# Patient Record
Sex: FEMALE | Race: WHITE | NOT HISPANIC OR LATINO | Employment: FULL TIME | URBAN - METROPOLITAN AREA
[De-identification: names, ages, dates, MRNs, and addresses within clinical notes are randomized per-mention and may not be internally consistent; named-entity substitution may affect disease eponyms.]

---

## 2017-02-09 ENCOUNTER — OFFICE VISIT (OUTPATIENT)
Dept: FAMILY MEDICINE | Facility: CLINIC | Age: 32
End: 2017-02-09
Payer: COMMERCIAL

## 2017-02-09 VITALS
HEIGHT: 67 IN | DIASTOLIC BLOOD PRESSURE: 81 MMHG | OXYGEN SATURATION: 98 % | TEMPERATURE: 97.3 F | BODY MASS INDEX: 32.8 KG/M2 | HEART RATE: 83 BPM | WEIGHT: 209 LBS | RESPIRATION RATE: 18 BRPM | SYSTOLIC BLOOD PRESSURE: 116 MMHG

## 2017-02-09 DIAGNOSIS — N62 HYPERTROPHY OF BREAST: ICD-10-CM

## 2017-02-09 DIAGNOSIS — Z01.818 PREOP GENERAL PHYSICAL EXAM: Primary | ICD-10-CM

## 2017-02-09 LAB — HGB BLD-MCNC: 12.9 G/DL (ref 11.7–15.7)

## 2017-02-09 PROCEDURE — 36415 COLL VENOUS BLD VENIPUNCTURE: CPT | Performed by: NURSE PRACTITIONER

## 2017-02-09 PROCEDURE — 85018 HEMOGLOBIN: CPT | Performed by: NURSE PRACTITIONER

## 2017-02-09 PROCEDURE — 99214 OFFICE O/P EST MOD 30 MIN: CPT | Performed by: NURSE PRACTITIONER

## 2017-02-09 NOTE — NURSING NOTE
"Chief Complaint   Patient presents with     Pre-Op Exam       Initial Resp 18  Ht 5' 7\" (1.702 m)  Wt 209 lb (94.802 kg)  BMI 32.73 kg/m2 Estimated body mass index is 32.73 kg/(m^2) as calculated from the following:    Height as of this encounter: 5' 7\" (1.702 m).    Weight as of this encounter: 209 lb (94.802 kg).  Medication Reconciliation: complete     Nivia Stevens MA        "

## 2017-02-09 NOTE — PROGRESS NOTES
[address] [address]  [phone]  Dept: [phone]    PRE-OP EVALUATION:  Today's date: 2017    Brent Walters (: 1985) presents for pre-operative evaluation assessment as requested by Dr. Valverde.  She requires evaluation and anesthesia risk assessment prior to undergoing surgery/procedure for Breast hypertrophy.  Proposed procedure: Breast reduction     Date of Surgery/ Procedure: 2017  Time of Surgery/ Procedure: 7:30   Hospital/Surgical Facility: [facility]   Fax number for surgical facility: [phone]  Telephone #: [phone]  Primary Physician: Josefina Aquino  Type of Anesthesia Anticipated: to be determined    Patient has a Health Care Directive or Living Will:  NO    Preop Questions 2017   1.  Do you have a history of heart attack, stroke, stent, bypass or surgery on an artery in the head, neck, heart or legs? No   2.  Do you ever have any pain or discomfort in your chest? No   3.  Do you have a history of  Heart Failure? No   4.   Are you troubled by shortness of breath when:  walking on a level surface, or up a slight hill, or at night? No   5.  Do you currently have a cold, bronchitis or other respiratory infection? No   6.  Do you have a cough, shortness of breath, or wheezing? No   7.  Do you sometimes get pains in the calves of your legs when you walk? No   8. Do you or anyone in your family have previous history of blood clots? No   9.  Do you or does anyone in your family have a serious bleeding problem such as prolonged bleeding following surgeries or cuts? No   10. Have you ever had problems with anemia or been told to take iron pills? YES - When pt was pregnant    11. Have you had any abnormal blood loss such as black, tarry or bloody stools, or abnormal vaginal bleeding? No   12. Have you ever had a blood transfusion? No   13. Have you or any of your relatives ever had problems with anesthesia? No   14. Do  you have sleep apnea, excessive snoring or daytime drowsiness? No   15. Do you have any prosthetic heart valves? No   16. Do you have prosthetic joints? No   17. Is there any chance that you may be pregnant? No           HPI:                                                      Brief HPI related to upcoming procedure: Chronic back pain due to breast hypertrophy      See problem list for active medical problems.  Problems all longstanding and stable, except as noted/documented.  See ROS for pertinent symptoms related to these conditions.                                                                                                  .    MEDICAL HISTORY:                                                      Patient Active Problem List    Diagnosis Date Noted     LSIL (low grade squamous intraepithelial lesion) on Pap smear 11/10/2011     Priority: Medium     11/10/11: LSIL. Plan colp.  12/1/11: [place] - QUIQUE I. Plan pap in 6 & 12 months.  7/23/12: NIL pap. Plan pap in 6 months.  1/2013 Pap Ascus +HPV 34 (probable HR)Per Dr. Ramos. Not one of high risk 14 HPVs so will have pap in 3 yrs.  07/19/16: NIL pap. Plan cotest in 1 year per provider       CARDIOVASCULAR SCREENING; LDL GOAL LESS THAN 160 03/08/2011     Priority: Medium     Major depression in complete remission (H) 03/08/2011     Priority: Medium     Anxiety 03/08/2011     Priority: Medium      Past Medical History   Diagnosis Date     LSIL (low grade squamous intraepithelial lesion) on Pap smear 11/2011     colp - QUIQUE I     Depressive disorder      Past Surgical History   Procedure Laterality Date     D & c       Surgical history of -        tummy tuck     Abdomen surgery       [facility]     Current Outpatient Prescriptions   Medication Sig Dispense Refill     citalopram (CELEXA) 40 MG tablet Take 1 tablet (40 mg) by mouth daily 90 tablet 3     citalopram (CELEXA) 40 MG tablet Take 1 tablet (40 mg) by mouth daily 30 tablet PRN     LORazepam (ATIVAN) 0.5 MG  tablet Take 1 tablet (0.5 mg) by mouth every 6 hours as needed for anxiety 30 tablet 1     CLINDAMYCIN HCL PO Take by mouth 4 times daily Patient doesn't know the mg strength       tiZANidine (ZANAFLEX) 4 MG tablet Take 1 tablet (4 mg) by mouth nightly as needed for muscle spasms 30 tablet 0     multivitamin, therapeutic with minerals (MULTI-VITAMIN) TABS Take 1 tablet by mouth daily       OTC products: None, except as noted above    Allergies   Allergen Reactions     Sulfa Drugs       Latex Allergy: NO    Social History   Substance Use Topics     Smoking status: Never Smoker      Smokeless tobacco: Never Used     Alcohol Use: Yes      Comment: maybe 3     History   Drug Use No       REVIEW OF SYSTEMS:                                                    C: NEGATIVE for fever, chills, change in weight  I: NEGATIVE for worrisome rashes, moles or lesions  E: NEGATIVE for vision changes or irritation  E/M: NEGATIVE for ear, mouth and throat problems  R: NEGATIVE for significant cough or SOB  CV: NEGATIVE for chest pain, palpitations or peripheral edema  GI: NEGATIVE for nausea, abdominal pain, heartburn, or change in bowel habits  : NEGATIVE for frequency, dysuria, or hematuria  N: NEGATIVE for weakness, dizziness or paresthesias  E: NEGATIVE for temperature intolerance, skin/hair changes  H: NEGATIVE for bleeding problems  P: NEGATIVE for changes in mood or affect    EXAM:                                                    There were no vitals taken for this visit.    GENERAL APPEARANCE: healthy, alert and no distress     EYES: EOMI,- PERRL     HENT: ear canals and TM's normal and nose and mouth without ulcers or lesions     NECK: no adenopathy, no asymmetry, masses, or scars and thyroid normal to palpation     RESP: lungs clear to auscultation - no rales, rhonchi or wheezes     CV: regular rates and rhythm, normal S1 S2, no S3 or S4 and no murmur, click or rub -     ABDOMEN:  soft, nontender, no HSM or masses and  bowel sounds normal     MS: extremities normal- no gross deformities noted, no evidence of inflammation in joints, FROM in all extremities.     SKIN: no suspicious lesions or rashes     NEURO: Normal strength and tone, sensory exam grossly normal, mentation intact and speech normal     PSYCH: mentation appears normal. and affect normal/bright     LYMPHATICS: No axillary, cervical, inguinal, or supraclavicular nodes    DIAGNOSTICS:                                                    Hgb:     Recent Labs   Lab Test  03/04/15   1429  12/10/14   1406   01/16/13   1042   HGB  13.0  12.6   < >  13.2   PLT   --   243   --    --    POTASSIUM   --    --    --   4.1    < > = values in this interval not displayed.        IMPRESSION:                                                    Reason for surgery/procedure: Breast reduction    The proposed surgical procedure is considered INTERMEDIATE risk.    REVISED CARDIAC RISK INDEX  The patient has the following serious cardiovascular risks for perioperative complications such as (MI, PE, VFib and 3  AV Block):  No serious cardiac risks  INTERPRETATION: 0 risks: Class I (very low risk - 0.4% complication rate)    The patient has the following additional risks for perioperative complications:  No identified additional risks    No diagnosis found.    RECOMMENDATIONS:                                                          --Patient is to take all scheduled medications on the day of surgery EXCEPT for modifications listed below.    APPROVAL GIVEN to proceed with proposed procedure, without further diagnostic evaluation       Signed Electronically by: Josefina Aquino NP    Copy of this evaluation report is provided to requesting physician.    [place] Preop Guidelines

## 2017-05-10 ENCOUNTER — OFFICE VISIT (OUTPATIENT)
Dept: FAMILY MEDICINE | Facility: CLINIC | Age: 32
End: 2017-05-10
Payer: COMMERCIAL

## 2017-05-10 ENCOUNTER — TELEPHONE (OUTPATIENT)
Dept: FAMILY MEDICINE | Facility: CLINIC | Age: 32
End: 2017-05-10

## 2017-05-10 ENCOUNTER — RADIANT APPOINTMENT (OUTPATIENT)
Dept: GENERAL RADIOLOGY | Facility: CLINIC | Age: 32
End: 2017-05-10
Attending: NURSE PRACTITIONER
Payer: COMMERCIAL

## 2017-05-10 VITALS
WEIGHT: 216.5 LBS | SYSTOLIC BLOOD PRESSURE: 108 MMHG | HEART RATE: 92 BPM | BODY MASS INDEX: 33.91 KG/M2 | RESPIRATION RATE: 20 BRPM | TEMPERATURE: 98.1 F | OXYGEN SATURATION: 98 % | DIASTOLIC BLOOD PRESSURE: 78 MMHG

## 2017-05-10 DIAGNOSIS — R10.9 ABDOMINAL PAIN, UNSPECIFIED LOCATION: Primary | ICD-10-CM

## 2017-05-10 DIAGNOSIS — R10.9 ABDOMINAL PAIN, UNSPECIFIED LOCATION: ICD-10-CM

## 2017-05-10 DIAGNOSIS — F32.5 MAJOR DEPRESSION IN COMPLETE REMISSION (H): ICD-10-CM

## 2017-05-10 LAB
ALBUMIN UR-MCNC: NEGATIVE MG/DL
APPEARANCE UR: CLEAR
BASOPHILS # BLD AUTO: 0 10E9/L (ref 0–0.2)
BASOPHILS NFR BLD AUTO: 0.3 %
BILIRUB UR QL STRIP: NEGATIVE
COLOR UR AUTO: YELLOW
DIFFERENTIAL METHOD BLD: ABNORMAL
EOSINOPHIL # BLD AUTO: 0.1 10E9/L (ref 0–0.7)
EOSINOPHIL NFR BLD AUTO: 1.1 %
ERYTHROCYTE [DISTWIDTH] IN BLOOD BY AUTOMATED COUNT: 12.4 % (ref 10–15)
GLUCOSE UR STRIP-MCNC: NEGATIVE MG/DL
HCT VFR BLD AUTO: 33.4 % (ref 35–47)
HGB BLD-MCNC: 10.8 G/DL (ref 11.7–15.7)
HGB UR QL STRIP: ABNORMAL
KETONES UR STRIP-MCNC: NEGATIVE MG/DL
LEUKOCYTE ESTERASE UR QL STRIP: NEGATIVE
LYMPHOCYTES # BLD AUTO: 2.9 10E9/L (ref 0.8–5.3)
LYMPHOCYTES NFR BLD AUTO: 30.3 %
MCH RBC QN AUTO: 30.4 PG (ref 26.5–33)
MCHC RBC AUTO-ENTMCNC: 32.3 G/DL (ref 31.5–36.5)
MCV RBC AUTO: 94 FL (ref 78–100)
MICRO REPORT STATUS: NORMAL
MONOCYTES # BLD AUTO: 0.7 10E9/L (ref 0–1.3)
MONOCYTES NFR BLD AUTO: 7.7 %
NEUTROPHILS # BLD AUTO: 5.8 10E9/L (ref 1.6–8.3)
NEUTROPHILS NFR BLD AUTO: 60.6 %
NITRATE UR QL: NEGATIVE
PH UR STRIP: 7 PH (ref 5–7)
PLATELET # BLD AUTO: 230 10E9/L (ref 150–450)
RBC # BLD AUTO: 3.55 10E12/L (ref 3.8–5.2)
RBC #/AREA URNS AUTO: NORMAL /HPF (ref 0–2)
SP GR UR STRIP: 1.02 (ref 1–1.03)
SPECIMEN SOURCE: NORMAL
URN SPEC COLLECT METH UR: ABNORMAL
UROBILINOGEN UR STRIP-ACNC: 0.2 EU/DL (ref 0.2–1)
WBC # BLD AUTO: 9.6 10E9/L (ref 4–11)
WBC #/AREA URNS AUTO: NORMAL /HPF (ref 0–2)
WET PREP SPEC: NORMAL

## 2017-05-10 PROCEDURE — 36415 COLL VENOUS BLD VENIPUNCTURE: CPT | Performed by: NURSE PRACTITIONER

## 2017-05-10 PROCEDURE — 87210 SMEAR WET MOUNT SALINE/INK: CPT | Performed by: NURSE PRACTITIONER

## 2017-05-10 PROCEDURE — 85025 COMPLETE CBC W/AUTO DIFF WBC: CPT | Performed by: NURSE PRACTITIONER

## 2017-05-10 PROCEDURE — 74020 XR ABDOMEN 2 VW: CPT

## 2017-05-10 PROCEDURE — 81001 URINALYSIS AUTO W/SCOPE: CPT | Performed by: NURSE PRACTITIONER

## 2017-05-10 PROCEDURE — 99214 OFFICE O/P EST MOD 30 MIN: CPT | Performed by: NURSE PRACTITIONER

## 2017-05-10 RX ORDER — OXYCODONE AND ACETAMINOPHEN 5; 325 MG/1; MG/1
1 TABLET ORAL EVERY 8 HOURS PRN
Qty: 10 TABLET | Refills: 0 | Status: SHIPPED | OUTPATIENT
Start: 2017-05-10 | End: 2017-05-12

## 2017-05-10 NOTE — PROGRESS NOTES
"  SUBJECTIVE:                                                    Brent Walters is a 31 year old female who presents to clinic today for the following health issues:      Abdominal Pain/Pelvic Pain       Duration: yesterday after pt ate some smoked salmon and cheese curds her stomach started hurting. It was pain from her lower abdominal area near bladder and radiating upwards. She thought maybe what she ate caused her symptoms but she has never had a reaction before.    Pain did not go away. Stomach was very bloated and tight. Overnight she was having flank pain and low back and also in rectum area. Passing gas is painful.      She feels off, weak and sluggish. There is a constant cramping, aching tenderness, and intermittent sharp abdominal pain. It is painful to walk.     Accompanying signs and symptoms:    Fever/chills: no   Flank pain no  Nausea and vomiting: YES- nausea is very mild, \"more painful than anything\". She tried to eat yogurt today and it was painful.     Therapies tried and outcome: none        She noticed some brownish vaginal discharge this morning.  Her period is not due for another 1.5 weeks.  She is not concerned about STIs.  She has had the same female partner for many years.   Pressure with urination.  No dysuria or hematuria.   Most of the abdominal pain is across her lower abdomen.    BMs have been regular.         Problem list and histories reviewed & adjusted, as indicated.  Additional history: as documented        Reviewed and updated as needed this visit by clinical staff       Reviewed and updated as needed this visit by Provider         ROS:  C: NEGATIVE for fever, chills, change in weight  INTEGUMENTARY/SKIN: NEGATIVE for worrisome rashes, moles or lesions  E/M: NEGATIVE for ear, mouth and throat problems  R: NEGATIVE for significant cough or SOB  CV: NEGATIVE for chest pain, palpitations or peripheral edema  GI: see HPI  MUSCULOSKELETAL: NEGATIVE for significant arthralgias or " myalgia  NEURO: NEGATIVE for weakness, dizziness or paresthesias    OBJECTIVE:                                                    /78  Pulse 92  Temp 98.1  F (36.7  C) (Oral)  Resp 20  Wt 216 lb 8 oz (98.2 kg)  SpO2 98%  BMI 33.91 kg/m2  Body mass index is 33.91 kg/(m^2).  GENERAL: healthy, alert and no distress  NECK: no adenopathy, no asymmetry, masses, or scars and thyroid normal to palpation  RESP: lungs clear to auscultation - no rales, rhonchi or wheezes  CV: regular rate and rhythm, normal S1 S2, no S3 or S4, no murmur, click or rub, no peripheral edema and peripheral pulses strong  ABDOMEN: soft, lower abdominal tenderness, no hepatosplenomegaly, no masses and bowel sounds normal   (female): normal female external genitalia and no CMT, tender over uterus  MS: no gross musculoskeletal defects noted, no edema  SKIN: no suspicious lesions or rashes    Diagnostic Test Results:  No results found for this or any previous visit (from the past 24 hour(s)).     ASSESSMENT/PLAN:                                                            1. Abdominal pain, unspecified location  Differential includes ruptured ovarian cyst, constipation, gastroenteritis, appendicitis (less likely)  Will get a pelvic ultrasound.  Percocet for pain PRN.  Discussed the use and indication of this medication as well as potential side effects.   Hemoglobin is low, will need follow up.  Follow up in one week.  Go to the ED if symptoms worsen or develops a fever.   - XR Abdomen 2 Views; Future  - *UA reflex to Microscopic  - CBC with platelets differential  - Wet prep  - Urine Microscopic  - US Pelvic Complete w Transvaginal; Future  - oxyCODONE-acetaminophen (PERCOCET) 5-325 MG per tablet; Take 1 tablet by mouth every 8 hours as needed for pain  Dispense: 10 tablet; Refill: 0  - CT Abdomen Pelvis w Contrast; Future    2. Major depression in complete remission (H)  Well-controlled.          Josefina Aquino NP  [facility]  [place]

## 2017-05-10 NOTE — TELEPHONE ENCOUNTER
"Brent Walters is a 31 year old female who calls with Abdominal pain.    NURSING ASSESSMENT:  Description:  Started yesterday after eating food - smoked salmon and cheese curds - had bowel movement shortly after and then noticed abdominal pain - has tried home care measures of  1. Heat  2. Ibuprofen  3. Tums    All have been ineffective for symptom relief    Onset/duration:  5/9/2017  Precip. factors:  Food intake  Associated symptoms:    1. Abdominal Pain:  \"all the way from my bladder to up above my belly button\" - bloating/swelling - large amount - tender/hard - 5/10 - persistent over last 24 hours - achey - worsens with coughing \"extremely bad\"   -nausea   -some weakness - denies severe weakness or feeling faint  2. Vaginal Discharge - Brown - mucous denies odor  3. Denies itching, burning, rash, frequency/urgency, - no unprotected sex or new partners, vomiting, diarrhea  4. Last bowel movement - 5/10/2017 - after eating - feels like it occurred after eating food     Improves/worsens symptoms:  Rest/coughing  Last exam/Treatment:  2/9/2017  Allergies:   Allergies   Allergen Reactions     Sulfa Drugs        MEDICATIONS:   Taking medication(s) as prescribed? N/A  Taking over the counter medication(s?) Yes  Any medication side effects? No significant side effects    Any barriers to taking medication(s) as prescribed?  No  Medication(s) improving/managing symptoms?  No  Medication reconciliation completed: No      NURSING PLAN: Nursing advice to patient encouraged early office visit 2-4 hours - wanted office visit this afternoon with PCP Mc Aquino - scheduled 215 - advised clear liquids, bland foods, chamomile, peppermint, khadra tea - OK for heat/rest - to ED/911 for severe worsening symptoms, weakness, fever  - patient agrees with plan    RECOMMENDED DISPOSITION:  See in 4 hours, another person to drive - see above  Will comply with recommendation: Yes  If further questions/concerns or if symptoms do not improve, " worsen or new symptoms develop, call your PCP or [facility] as soon as possible.      Guideline used:  Telephone Triage Protocols for Nurses, Fifth Edition, Alesia Monroy RN

## 2017-05-10 NOTE — LETTER
[facility]   [address], [place]  [address]  [phone]        May 10, 2017      RE: Brent Walters  [address]  [facility] [place] [address]       To whom it may concern:    Brent Walters was seen in our clinic today. She missed work 5/10-5/11 due to illness.         Sincerely,    {[facility] PROVIDERS:[id number]}

## 2017-05-10 NOTE — MR AVS SNAPSHOT
After Visit Summary   5/10/2017    Brent Walters    MRN: [id number]           Patient Information     Date Of Birth          1985        Visit Information        Provider Department      5/10/2017 2:15 PM Josefina Aquino NP [facility]        Today's Diagnoses     Abdominal pain, unspecified location    -  1    Major depression in complete remission (H)           Follow-ups after your visit        Your next 10 appointments already scheduled     May 17, 2017  3:15 PM CDT   Office Visit with Josefina Aquino NP   [facility]    [address] [facility] [id number] [phone]           Bring a current list of meds and any records pertaining to this visit.  For Physicals, please bring immunization records and any forms needing to be filled out.  Please arrive 10 minutes early to complete paperwork.              Who to contact     If you have questions or need follow up information about today's clinic visit or your schedule please contact [facility] directly at [phone].  Normal or non-critical lab and imaging results will be communicated to you by [email or web address], letter or phone within 4 business days after the clinic has received the results. If you do not hear from us within 7 days, please contact the clinic through [employer] or phone. If you have a critical or abnormal lab result, we will notify you by phone as soon as possible.  Submit refill requests through [email or web address] or call your pharmacy and they will forward the refill request to us. Please allow 3 business days for your refill to be completed.          Additional Information About Your Visit        MyChart Information     [email or web address] gives you secure access to your electronic health record. If you see a primary care provider, you can also send messages to your care team and make appointments. If you have questions, please call your primary care  clinic.  If you do not have a primary care provider, please call [phone] and they will assist you.        Care EveryWhere ID     This is your Care EveryWhere ID. This could be used by other organizations to access your [facility] [facility] medical records  [id number]        Your Vitals Were     Pulse Temperature Respirations Pulse Oximetry BMI (Body Mass Index)       92 98.1  F (36.7  C) (Oral) 20 98% 33.91 kg/m2        Blood Pressure from Last 3 Encounters:   05/10/17 108/78   02/09/17 116/81   07/19/16 108/76    Weight from Last 3 Encounters:   05/10/17 216 lb 8 oz (98.2 kg)   02/09/17 209 lb (94.8 kg)   07/19/16 226 lb (102.5 kg)              We Performed the Following     *UA reflex to Microscopic     CBC with platelets differential     Urine Microscopic     Wet prep          Today's Medication Changes          These changes are accurate as of: 5/10/17 11:59 PM.  If you have any questions, ask your nurse or doctor.               Start taking these medicines.        Dose/Directions    oxyCODONE-acetaminophen 5-325 MG per tablet   Commonly known as:  PERCOCET   Used for:  Abdominal pain, unspecified location   Started by:  Josefina Aquino NP        Dose:  1 tablet   Take 1 tablet by mouth every 8 hours as needed for pain   Quantity:  10 tablet   Refills:  0            Where to get your medicines      Some of these will need a paper prescription and others can be bought over the counter.  Ask your nurse if you have questions.     Bring a paper prescription for each of these medications     oxyCODONE-acetaminophen 5-325 MG per tablet                Primary Care Provider Office Phone # Fax #    Josefina Aquino [phone]       [facility] [address]  [facility] [address]        Thank you!     Thank you for choosing [facility]  for your care. Our goal is always to provide you with excellent care. Hearing back from our patients is one way we can continue to  improve our services. Please take a few minutes to complete the written survey that you may receive in the mail after your visit with us. Thank you!             Your Updated Medication List - Protect others around you: Learn how to safely use, store and throw away your medicines at www.disposemymeds.org.          This list is accurate as of: 5/10/17 11:59 PM.  Always use your most recent med list.                   Brand Name Dispense Instructions for use    citalopram 40 MG tablet    celeXA    90 tablet    Take 1 tablet (40 mg) by mouth daily       LORazepam 0.5 MG tablet    ATIVAN    30 tablet    Take 1 tablet (0.5 mg) by mouth every 6 hours as needed for anxiety       Multi-vitamin Tabs tablet      Take 1 tablet by mouth daily       oxyCODONE-acetaminophen 5-325 MG per tablet    PERCOCET    10 tablet    Take 1 tablet by mouth every 8 hours as needed for pain       tiZANidine 4 MG tablet    ZANAFLEX    30 tablet    Take 1 tablet (4 mg) by mouth nightly as needed for muscle spasms

## 2017-05-10 NOTE — NURSING NOTE
"Chief Complaint   Patient presents with     Abdominal Pain     Pelvic Pain       Initial /78  Pulse 92  Temp 98.1  F (36.7  C) (Oral)  Resp 20  Wt 216 lb 8 oz (98.2 kg)  SpO2 98%  BMI 33.91 kg/m2 Estimated body mass index is 33.91 kg/(m^2) as calculated from the following:    Height as of 2/9/17: 5' 7\" (1.702 m).    Weight as of this encounter: 216 lb 8 oz (98.2 kg).  Medication Reconciliation: complete       Nivia Stevens MA      "

## 2017-05-11 ASSESSMENT — PATIENT HEALTH QUESTIONNAIRE - PHQ9: SUM OF ALL RESPONSES TO PHQ QUESTIONS 1-9: 3

## 2017-05-12 ENCOUNTER — TELEPHONE (OUTPATIENT)
Dept: FAMILY MEDICINE | Facility: CLINIC | Age: 32
End: 2017-05-12

## 2017-05-12 DIAGNOSIS — R10.9 ABDOMINAL PAIN, UNSPECIFIED LOCATION: ICD-10-CM

## 2017-05-12 RX ORDER — OXYCODONE AND ACETAMINOPHEN 5; 325 MG/1; MG/1
1 TABLET ORAL EVERY 8 HOURS PRN
Qty: 12 TABLET | Refills: 0 | Status: SHIPPED | OUTPATIENT
Start: 2017-05-12 | End: 2017-05-17

## 2017-05-12 NOTE — TELEPHONE ENCOUNTER
DL review:  Patient is in a same sex relationship and she denies any chance of being pregnant.  She said Josefina told her there might be evidence of an ovarian cyst rupture which might explain the pain.  Her pain scale is about a 7 in intensity. It is slowly improving. She has 3 percocet left but was wishing to have medication for weekend. She can come to  at the [facility] pharmacy next door.     Nurse to call back with plan.   Clarita Oates RN

## 2017-05-12 NOTE — TELEPHONE ENCOUNTER
Patient c/o continued abdominal pain radiating to lower back. Pain has not worsened but the amt. But NSAIDS are not working well to manage pain and 1 percocet every 8 hrs is not lasting the whole 8 hrs. Patient requesting a slight increase in  Percocet just to get through the weekend.     Current Rx: oxyCODONE-acetaminophen (PERCOCET) 5-325 MG per tablet 1 tablet by mouth every 8 hours as needed for pain    Please advise as appropriate. Routing to [facility].    Thanks! Dara Hwang RN

## 2017-05-12 NOTE — TELEPHONE ENCOUNTER
[facility] team can you walk this next door and I will inform patient .  Tell pharmacy to get it ready.  She will  by noon tomorrow at latest.  Thank you.   Clarita Oates RN

## 2017-05-12 NOTE — TELEPHONE ENCOUNTER
Refilled. In nurse box. Can take up to every 6 hours. I think the refill should get her through the weekend.     Fuad Guerrero

## 2017-05-12 NOTE — TELEPHONE ENCOUNTER
Did she get pregnancy testing? On the CT there was a question if this could be an ectopic pregnancy. If percocet not helping for the pain, I would be concerned about this and want to rule this out.     uFad Guerrero

## 2017-05-17 ENCOUNTER — OFFICE VISIT (OUTPATIENT)
Dept: FAMILY MEDICINE | Facility: CLINIC | Age: 32
End: 2017-05-17
Payer: COMMERCIAL

## 2017-05-17 VITALS
BODY MASS INDEX: 33.99 KG/M2 | WEIGHT: 217 LBS | DIASTOLIC BLOOD PRESSURE: 69 MMHG | RESPIRATION RATE: 18 BRPM | SYSTOLIC BLOOD PRESSURE: 100 MMHG | TEMPERATURE: 98.5 F | HEART RATE: 92 BPM | OXYGEN SATURATION: 98 %

## 2017-05-17 DIAGNOSIS — D50.9 IRON DEFICIENCY ANEMIA, UNSPECIFIED IRON DEFICIENCY ANEMIA TYPE: ICD-10-CM

## 2017-05-17 DIAGNOSIS — N83.209 HEMORRHAGIC CYST OF OVARY: Primary | ICD-10-CM

## 2017-05-17 LAB
ERYTHROCYTE [DISTWIDTH] IN BLOOD BY AUTOMATED COUNT: 13.3 % (ref 10–15)
HCT VFR BLD AUTO: 35.4 % (ref 35–47)
HGB BLD-MCNC: 11.4 G/DL (ref 11.7–15.7)
MCH RBC QN AUTO: 31.2 PG (ref 26.5–33)
MCHC RBC AUTO-ENTMCNC: 32.2 G/DL (ref 31.5–36.5)
MCV RBC AUTO: 97 FL (ref 78–100)
PLATELET # BLD AUTO: 302 10E9/L (ref 150–450)
RBC # BLD AUTO: 3.65 10E12/L (ref 3.8–5.2)
WBC # BLD AUTO: 8.4 10E9/L (ref 4–11)

## 2017-05-17 PROCEDURE — 36415 COLL VENOUS BLD VENIPUNCTURE: CPT | Performed by: NURSE PRACTITIONER

## 2017-05-17 PROCEDURE — 99213 OFFICE O/P EST LOW 20 MIN: CPT | Performed by: NURSE PRACTITIONER

## 2017-05-17 PROCEDURE — 82728 ASSAY OF FERRITIN: CPT | Performed by: NURSE PRACTITIONER

## 2017-05-17 PROCEDURE — 83540 ASSAY OF IRON: CPT | Performed by: NURSE PRACTITIONER

## 2017-05-17 PROCEDURE — 85027 COMPLETE CBC AUTOMATED: CPT | Performed by: NURSE PRACTITIONER

## 2017-05-17 PROCEDURE — 83550 IRON BINDING TEST: CPT | Performed by: NURSE PRACTITIONER

## 2017-05-17 NOTE — PROGRESS NOTES
SUBJECTIVE:                                                    Brent Walters is a 31 year old female who presents to clinic today for the following health issues:      Abdominal Pain Follow Up    She is here for follow up of probably ruptured hemorrhagic ovarian cyst.  She is feeling much better.  The pain has now resolved and she no longer needs pain medication.  She was somewhat constipated from the pain medication, but bowel movements are now normal.  Her hemoglobin was low.  She is feeling somewhat tired.  She now has her period.    She denies any shortness of breath or dizziness.          Problem list and histories reviewed & adjusted, as indicated.  Additional history: as documented        Reviewed and updated as needed this visit by clinical staff  Tobacco  Allergies  Meds  Med Hx  Surg Hx  Fam Hx  Soc Hx      Reviewed and updated as needed this visit by Provider         ROS:  C: NEGATIVE for fever, chills, change in weight  E/M: NEGATIVE for ear, mouth and throat problems  R: NEGATIVE for significant cough or SOB  CV: NEGATIVE for chest pain, palpitations or peripheral edema  GI: NEGATIVE for nausea, abdominal pain, heartburn, or change in bowel habits  MUSCULOSKELETAL: NEGATIVE for significant arthralgias or myalgia  NEURO: NEGATIVE for weakness, dizziness or paresthesias    OBJECTIVE:                                                    /69  Pulse 92  Temp 98.5  F (36.9  C) (Oral)  Resp 18  Wt 217 lb (98.4 kg)  SpO2 98%  BMI 33.99 kg/m2  Body mass index is 33.99 kg/(m^2).  GENERAL: healthy, alert and no distress  RESP: lungs clear to auscultation - no rales, rhonchi or wheezes  CV: regular rate and rhythm, normal S1 S2, no S3 or S4, no murmur, click or rub, no peripheral edema and peripheral pulses strong  ABDOMEN: soft, nontender, no hepatosplenomegaly, no masses and bowel sounds normal    Diagnostic Test Results:  Results for orders placed or performed in visit on 05/17/17 (from the  past 24 hour(s))   CBC with platelets   Result Value Ref Range    WBC 8.4 4.0 - 11.0 10e9/L    RBC Count 3.65 (L) 3.8 - 5.2 10e12/L    Hemoglobin 11.4 (L) 11.7 - 15.7 g/dL    Hematocrit 35.4 35.0 - 47.0 %    MCV 97 78 - 100 fl    MCH 31.2 26.5 - 33.0 pg    MCHC 32.2 31.5 - 36.5 g/dL    RDW 13.3 10.0 - 15.0 %    Platelet Count 302 150 - 450 10e9/L        ASSESSMENT/PLAN:                                                            1. Hemorrhagic cyst of ovary  Repeat pelvic US in 6-12 weeks.  - US Pelvic Complete w Transvaginal; Future    2. Iron deficiency anemia, unspecified iron deficiency anemia type  Discussed high iron foods.   - CBC with platelets  - Ferritin  - Iron and iron binding capacity        Josefina Aquino NP  [facility]

## 2017-05-17 NOTE — NURSING NOTE
"Chief Complaint   Patient presents with     Abdominal Pain     follow up        Initial /69  Pulse 92  Temp 98.5  F (36.9  C) (Oral)  Resp 18  Wt 217 lb (98.4 kg)  SpO2 98%  BMI 33.99 kg/m2 Estimated body mass index is 33.99 kg/(m^2) as calculated from the following:    Height as of 2/9/17: 5' 7\" (1.702 m).    Weight as of this encounter: 217 lb (98.4 kg).  Medication Reconciliation: complete     Nivia Stevens MA      "

## 2017-05-17 NOTE — MR AVS SNAPSHOT
After Visit Summary   5/17/2017    Brent Walters    MRN: [id number]           Patient Information     Date Of Birth          1985        Visit Information        Provider Department      5/17/2017 3:15 PM Josefina Aquino NP [facility]        Today's Diagnoses     Hemorrhagic cyst of ovary    -  1    Iron deficiency anemia, unspecified iron deficiency anemia type           Follow-ups after your visit        Future tests that were ordered for you today     Open Future Orders        Priority Expected Expires Ordered    US Pelvic Complete w Transvaginal Routine  5/17/2018 5/17/2017            Who to contact     If you have questions or need follow up information about today's clinic visit or your schedule please contact [facility] directly at [phone].  Normal or non-critical lab and imaging results will be communicated to you by TrackingPointhart, letter or phone within 4 business days after the clinic has received the results. If you do not hear from us within 7 days, please contact the clinic through TrackingPointhart or phone. If you have a critical or abnormal lab result, we will notify you by phone as soon as possible.  Submit refill requests through [employer] or call your pharmacy and they will forward the refill request to us. Please allow 3 business days for your refill to be completed.          Additional Information About Your Visit        MyChart Information     [employer] gives you secure access to your electronic health record. If you see a primary care provider, you can also send messages to your care team and make appointments. If you have questions, please call your primary care clinic.  If you do not have a primary care provider, please call [phone] and they will assist you.        Care EveryWhere ID     This is your Care EveryWhere ID. This could be used by other organizations to access your [facility] medical records  [id number]        Your Vitals Were      Pulse Temperature Respirations Pulse Oximetry BMI (Body Mass Index)       92 98.5  F (36.9  C) (Oral) 18 98% 33.99 kg/m2        Blood Pressure from Last 3 Encounters:   05/17/17 100/69   05/10/17 108/78   02/09/17 116/81    Weight from Last 3 Encounters:   05/17/17 217 lb (98.4 kg)   05/10/17 216 lb 8 oz (98.2 kg)   02/09/17 209 lb (94.8 kg)              We Performed the Following     CBC with platelets     Ferritin     Iron and iron binding capacity        Primary Care Provider Office Phone # Fax #    Josefina Aquino [phone]       [facility] [address]  [facility] [address]        Thank you!     Thank you for choosing [facility]  for your care. Our goal is always to provide you with excellent care. Hearing back from our patients is one way we can continue to improve our services. Please take a few minutes to complete the written survey that you may receive in the mail after your visit with us. Thank you!             Your Updated Medication List - Protect others around you: Learn how to safely use, store and throw away your medicines at www.disposemymeds.org.          This list is accurate as of: 5/17/17  3:38 PM.  Always use your most recent med list.                   Brand Name Dispense Instructions for use    citalopram 40 MG tablet    celeXA    90 tablet    Take 1 tablet (40 mg) by mouth daily       LORazepam 0.5 MG tablet    ATIVAN    30 tablet    Take 1 tablet (0.5 mg) by mouth every 6 hours as needed for anxiety       Multi-vitamin Tabs tablet      Take 1 tablet by mouth daily       tiZANidine 4 MG tablet    ZANAFLEX    30 tablet    Take 1 tablet (4 mg) by mouth nightly as needed for muscle spasms

## 2017-05-18 LAB
FERRITIN SERPL-MCNC: 150 NG/ML (ref 12–150)
IRON SATN MFR SERPL: 16 % (ref 15–46)
IRON SERPL-MCNC: 58 UG/DL (ref 35–180)
TIBC SERPL-MCNC: 366 UG/DL (ref 240–430)

## 2017-05-24 DIAGNOSIS — N76.0 VAGINITIS AND VULVOVAGINITIS: ICD-10-CM

## 2017-05-24 RX ORDER — METRONIDAZOLE 7.5 MG/G
GEL VAGINAL
Qty: 70 G | Refills: 0 | Status: SHIPPED | OUTPATIENT
Start: 2017-05-24 | End: 2017-08-03

## 2017-05-24 NOTE — TELEPHONE ENCOUNTER
Patient is requesting for a refill, please advise. Preferred pharmacy is [facility] [id number] [place] [address] [address] [facility] [address]    Thank you  Araceli CHRISTIE  Patient Rep

## 2017-06-22 DIAGNOSIS — F41.9 ANXIETY: ICD-10-CM

## 2017-06-22 DIAGNOSIS — F32.5 MAJOR DEPRESSION IN COMPLETE REMISSION (H): ICD-10-CM

## 2017-06-23 RX ORDER — CITALOPRAM HYDROBROMIDE 40 MG/1
TABLET ORAL
Qty: 30 TABLET | Refills: 3 | Status: SHIPPED | OUTPATIENT
Start: 2017-06-23 | End: 2017-08-16

## 2017-06-23 NOTE — TELEPHONE ENCOUNTER
Medication Detail      Disp Refills Start End YAMEL   citalopram (CELEXA) 40 MG tablet 90 tablet 3 7/20/2016  No   Sig: Take 1 tablet (40 mg) by mouth daily       Last Office Visit with [facility] care provider:  5-17-17        Last PHQ-9 score on record=   PHQ-9 SCORE 5/10/2017   Total Score -   Total Score 3

## 2017-06-23 NOTE — TELEPHONE ENCOUNTER
Prescription approved per [facility] Refill Protocol.  JANICE Mello, BSN, RN

## 2017-07-22 DIAGNOSIS — N76.0 VAGINITIS AND VULVOVAGINITIS: ICD-10-CM

## 2017-07-22 NOTE — TELEPHONE ENCOUNTER
Medication Detail      Disp Refills Start End YAMEL   metroNIDAZOLE (METROGEL) 0.75 % vaginal gel 70 g 0 5/24/2017  No   Sig: PLACE ONE APPLICATORFUL VAGINALLY AT BEDTIME   Class: E-Prescribe   Order: [id number]       Last Office Visit with [facility], [facility] or [facility] prescribing provider: 5/17/2017

## 2017-07-25 RX ORDER — METRONIDAZOLE 7.5 MG/G
GEL VAGINAL
Qty: 70 G | Refills: 0
Start: 2017-07-25

## 2017-07-25 NOTE — TELEPHONE ENCOUNTER
Routing refill request to provider for review/approval because:  Vaginal gel formulation not on [facility] refill protocol    Covering providers-Please sign if agree.    Thank you!  JANICE Mello, BHARATN, RN

## 2017-07-25 NOTE — TELEPHONE ENCOUNTER
Would be best handled by e-visit or office visit. Would defer to PCP if she feels it can be refilled without need for e-visit or office visit.    Lucius Donohue MD  Family Medicine Physician

## 2017-07-29 DIAGNOSIS — F41.9 ANXIETY: ICD-10-CM

## 2017-07-29 DIAGNOSIS — F32.5 MAJOR DEPRESSION IN COMPLETE REMISSION (H): ICD-10-CM

## 2017-08-01 RX ORDER — CITALOPRAM HYDROBROMIDE 40 MG/1
TABLET ORAL
Qty: 30 TABLET | Refills: 0 | Status: SHIPPED | OUTPATIENT
Start: 2017-08-01 | End: 2017-08-16

## 2017-08-01 NOTE — TELEPHONE ENCOUNTER
Medication is being filled for 1 time refill only due to:  due for appt and has a physical scheduled in August. Clarita Oates RN

## 2017-08-03 RX ORDER — METRONIDAZOLE 7.5 MG/G
GEL VAGINAL
Qty: 70 G | Refills: 0 | Status: SHIPPED | OUTPATIENT
Start: 2017-08-03 | End: 2018-06-05

## 2017-08-03 NOTE — TELEPHONE ENCOUNTER
Patient called to inquire about her medication request from the date below, 7/22/2017. Relayed that it was denied as she needs an E or office visit. States the pharmacy or nurse never told her this information, and that she has been living with her symptoms for well over one week. The message below from Dr. Nowak states 'defer to PCP if she feels it can be refilled without need for e-visit or office visit'. Unsure if this was routed to PCP. Patient states her symptoms are recurring and PCP is aware of this and usually fills without an issue. She would like this message to be re-routed to PCP to approve or deny. States she already has a visit to see PCP on 8/16/2017. Please address. Thanks!

## 2017-08-16 ENCOUNTER — OFFICE VISIT (OUTPATIENT)
Dept: FAMILY MEDICINE | Facility: CLINIC | Age: 32
End: 2017-08-16
Payer: COMMERCIAL

## 2017-08-16 VITALS
DIASTOLIC BLOOD PRESSURE: 79 MMHG | OXYGEN SATURATION: 97 % | SYSTOLIC BLOOD PRESSURE: 130 MMHG | BODY MASS INDEX: 35 KG/M2 | HEIGHT: 67 IN | TEMPERATURE: 99 F | RESPIRATION RATE: 12 BRPM | WEIGHT: 223 LBS | HEART RATE: 86 BPM

## 2017-08-16 DIAGNOSIS — N94.9 FEMALE GENITAL SYMPTOMS: ICD-10-CM

## 2017-08-16 DIAGNOSIS — Z00.00 ROUTINE GENERAL MEDICAL EXAMINATION AT A HEALTH CARE FACILITY: Primary | ICD-10-CM

## 2017-08-16 DIAGNOSIS — F32.5 MAJOR DEPRESSION IN COMPLETE REMISSION (H): ICD-10-CM

## 2017-08-16 DIAGNOSIS — F41.9 ANXIETY: ICD-10-CM

## 2017-08-16 PROCEDURE — G0145 SCR C/V CYTO,THINLAYER,RESCR: HCPCS | Performed by: NURSE PRACTITIONER

## 2017-08-16 PROCEDURE — 87624 HPV HI-RISK TYP POOLED RSLT: CPT | Performed by: NURSE PRACTITIONER

## 2017-08-16 PROCEDURE — 99395 PREV VISIT EST AGE 18-39: CPT | Performed by: NURSE PRACTITIONER

## 2017-08-16 RX ORDER — CITALOPRAM HYDROBROMIDE 40 MG/1
TABLET ORAL
Qty: 30 TABLET | Status: SHIPPED | OUTPATIENT
Start: 2017-08-16 | End: 2018-02-14

## 2017-08-16 RX ORDER — METRONIDAZOLE 7.5 MG/G
1 GEL VAGINAL AT BEDTIME
Qty: 70 G | Refills: 1 | Status: SHIPPED | OUTPATIENT
Start: 2017-08-16 | End: 2017-08-21

## 2017-08-16 NOTE — PROGRESS NOTES
SUBJECTIVE:   CC: Brent Walters is an 31 year old woman who presents for preventive health visit.     Physical   Annual:     Getting at least 3 servings of Calcium per day::  Yes    Bi-annual eye exam::  Yes    Dental care twice a year::  Yes    Sleep apnea or symptoms of sleep apnea::  None    Diet::  Regular (no restrictions)    Frequency of exercise::  2-3 days/week    Duration of exercise::  30-45 minutes    Taking medications regularly::  Yes    Medication side effects::  None    Additional concerns today::  No              Today's PHQ-2 Score:   PHQ-2 ( 1999 [employer]) 8/16/2017   Q1: Little interest or pleasure in doing things 1   Q2: Feeling down, depressed or hopeless 1   PHQ-2 Score 2   Q1: Little interest or pleasure in doing things Several days   Q2: Feeling down, depressed or hopeless Several days   PHQ-2 Score 2       Abuse: Current or Past(Physical, Sexual or Emotional)- No  Do you feel safe in your environment - Yes    Social History   Substance Use Topics     Smoking status: Never Smoker     Smokeless tobacco: Never Used     Alcohol use Yes      Comment: per week 0-3     The patient does not drink >3 drinks per day nor >7 drinks per week.    Reviewed orders with patient.  Reviewed health maintenance and updated orders accordingly - Yes          Pertinent mammograms are reviewed under the imaging tab.  History of abnormal Pap smear: YES - updated in Problem List and Health Maintenance accordingly    Reviewed and updated as needed this visit by clinical staff  Tobacco  Allergies  Meds         Reviewed and updated as needed this visit by Provider              ROS:  C: NEGATIVE for fever, chills, change in weight  I: NEGATIVE for worrisome rashes, moles or lesions  E: NEGATIVE for vision changes or irritation  ENT: NEGATIVE for ear, mouth and throat problems  R: NEGATIVE for significant cough or SOB  B: NEGATIVE for masses, tenderness or discharge  CV: NEGATIVE for chest pain, palpitations or  "peripheral edema  GI: NEGATIVE for nausea, abdominal pain, heartburn, or change in bowel habits  : NEGATIVE for unusual urinary or vaginal symptoms. Periods are regular.  M: NEGATIVE for significant arthralgias or myalgia  N: NEGATIVE for weakness, dizziness or paresthesias  P: NEGATIVE for changes in mood or affect     OBJECTIVE:   /79 (BP Location: Left arm)  Pulse 86  Temp 99  F (37.2  C) (Oral)  Resp 12  Ht 5' 7.25\" (1.708 m)  Wt 223 lb (101.2 kg)  LMP 07/17/2017  SpO2 97%  BMI 34.67 kg/m2  EXAM:  GENERAL: healthy, alert and no distress  EYES: Eyes grossly normal to inspection, PERRL and conjunctivae and sclerae normal  HENT: ear canals and TM's normal, nose and mouth without ulcers or lesions  NECK: no adenopathy, no asymmetry, masses, or scars and thyroid normal to palpation  RESP: lungs clear to auscultation - no rales, rhonchi or wheezes  BREAST: normal without masses, tenderness or nipple discharge and no palpable axillary masses or adenopathy  CV: regular rate and rhythm, normal S1 S2, no S3 or S4, no murmur, click or rub, no peripheral edema and peripheral pulses strong  ABDOMEN: soft, nontender, no hepatosplenomegaly, no masses and bowel sounds normal   (female): normal female external genitalia, normal urethral meatus, vaginal mucosa pink, moist, well rugated, and normal cervix/adnexa/uterus without masses or discharge  MS: no gross musculoskeletal defects noted, no edema  SKIN: no suspicious lesions or rashes  NEURO: Normal strength and tone, mentation intact and speech normal  PSYCH: mentation appears normal, affect normal/bright    ASSESSMENT/PLAN:   1. Routine general medical examination at a health care facility    - Pap imaged thin layer screen with HPV - recommended age 30 - 65  - HPV High Risk Types DNA Cervical    2. Female genital symptoms  Refills given for occasional BV.   - metroNIDAZOLE (METROGEL VAGINAL) 0.75 % vaginal gel; Place 1 applicator (5 g) vaginally At Bedtime " "for 5 days  Dispense: 70 g; Refill: 1    3. Major depression in complete remission (H)  The current medical regimen is effective;  continue present plan and medications.  - citalopram (CELEXA) 40 MG tablet; TAKE 1 TABLET(40 MG) BY MOUTH DAILY  Dispense: 30 tablet; Refill: PRN    4. Anxiety  The current medical regimen is effective;  continue present plan and medications.   - citalopram (CELEXA) 40 MG tablet; TAKE 1 TABLET(40 MG) BY MOUTH DAILY  Dispense: 30 tablet; Refill: PRN    COUNSELING:  Reviewed preventive health counseling, as reflected in patient instructions         reports that she has never smoked. She has never used smokeless tobacco.    Estimated body mass index is 34.67 kg/(m^2) as calculated from the following:    Height as of this encounter: 5' 7.25\" (1.708 m).    Weight as of this encounter: 223 lb (101.2 kg).   Weight management plan: Discussed healthy diet and exercise guidelines and patient will follow up in 12 months in clinic to re-evaluate.    Counseling Resources:  ATP IV Guidelines  Pooled Cohorts Equation Calculator  Breast Cancer Risk Calculator  FRAX Risk Assessment  ICSI Preventive Guidelines  Dietary Guidelines for Americans, 2010  USDA's MyPlate  ASA Prophylaxis  Lung CA Screening    Josefina Aquino NP  [facility]  Answers for HPI/ROS submitted by the patient on 8/16/2017   PHQ-2 Score: 2    "

## 2017-08-16 NOTE — LETTER
August 22, 2017      Brent FOZIA Selena  [address] [place] [address]    Dear MsKeithSelena,      This letter is in regards to the PAP smear and HPV (Human Papillomavirus) test you had done recently. Your PAP test result is normal, but your HPV (Human Papillomavirus) test was positive.     About 80 percent of women have been exposed to HPV virus throughout their lifetime. There is no medication for the treatment of HPV. Typically your own immune system gets rid of the virus before it does harm. HPV is spread by direct skin-to-skin contact, including sexual intercourse, oral sex, anal sex, or any other contact involving the genital area (example: hand to genital contact). It is not possible to become infected with HPV by touching an object, such as a toilet seat. Most people who are infected with HPV have no signs or symptoms.    Things that you can do to boost your immune system and help your body get rid of HPV: get plenty of rest, eat a well-balanced diet of healthy foods, and stop smoking.     Please return in 1 year to repeat your pap smear and HPV test.     If you have additional questions regarding this result, please call [phone].    Sincerely,      Josefina Aquino NP/yanet

## 2017-08-16 NOTE — LETTER
My Depression Action Plan  Name: Brent Walters   Date of Birth 1985  Date: 8/16/2017    My doctor: Josefina Aquino   My clinic: [address] [id number] [phone]          GREEN    ZONE   Good Control    What it looks like:     Things are going generally well. You have normal up s and down s. You may even feel depressed from time to time, but bad moods usually last less than a day.   What you need to do:  1. Continue to care for yourself (see self care plan)  2. Check your depression survival kit and update it as needed  3. Follow your physician s recommendations including any medication.  4. Do not stop taking medication unless you consult with your physician first.           YELLOW         ZONE Getting Worse    What it looks like:     Depression is starting to interfere with your life.     It may be hard to get out of bed; you may be starting to isolate yourself from others.    Symptoms of depression are starting to last most all day and this has happened for several days.     You may have suicidal thoughts but they are not constant.   What you need to do:     1. Call your care team, your response to treatment will improve if you keep your care team informed of your progress. Yellow periods are signs an adjustment may need to be made.     2. Continue your self-care, even if you have to fake it!    3. Talk to someone in your support network    4. Open up your depression survival kit           RED    ZONE Medical Alert - Get Help    What it looks like:     Depression is seriously interfering with your life.     You may experience these or other symptoms: You can t get out of bed most days, can t work or engage in other necessary activities, you have trouble taking care of basic hygiene, or basic responsibilities, thoughts of suicide or death that will not go away, self-injurious behavior.     What you need to do:  1. Call your care team and  request a same-day appointment. If they are not available (weekends or after hours) call your local crisis line, emergency room or 911.      Electronically signed by: Stefanie Connell, August 16, 2017    Depression Self Care Plan / Survival Kit    Self-Care for Depression  Here s the deal. Your body and mind are really not as separate as most people think.  What you do and think affects how you feel and how you feel influences what you do and think. This means if you do things that people who feel good do, it will help you feel better.  Sometimes this is all it takes.  There is also a place for medication and therapy depending on how severe your depression is, so be sure to consult with your medical provider and/ or Behavioral Health Consultant if your symptoms are worsening or not improving.     In order to better manage my stress, I will:    Exercise  Get some form of exercise, every day. This will help reduce pain and release endorphins, the  feel good  chemicals in your brain. This is almost as good as taking antidepressants!  This is not the same as joining a gym and then never going! (they count on that by the way ) It can be as simple as just going for a walk or doing some gardening, anything that will get you moving.      Hygiene   Maintain good hygiene (Get out of bed in the morning, Make your bed, Brush your teeth, Take a shower, and Get dressed like you were going to work, even if you are unemployed).  If your clothes don't fit try to get ones that do.    Diet  I will strive to eat foods that are good for me, drink plenty of water, and avoid excessive sugar, caffeine, alcohol, and other mood-altering substances.  Some foods that are helpful in depression are: complex carbohydrates, B vitamins, flaxseed, fish or fish oil, fresh fruits and vegetables.    Psychotherapy  I agree to participate in Individual Therapy (if recommended).    Medication  If prescribed medications, I agree to take them.  Missing doses can  result in serious side effects.  I understand that drinking alcohol, or other illicit drug use, may cause potential side effects.  I will not stop my medication abruptly without first discussing it with my provider.    Staying Connected With Others  I will stay in touch with my friends, family members, and my primary care provider/team.    Use your imagination  Be creative.  We all have a creative side; it doesn t matter if it s oil painting, sand castles, or mud pies! This will also kick up the endorphins.    Witness Beauty  (AKA stop and smell the roses) Take a look outside, even in mid-winter. Notice colors, textures. Watch the squirrels and birds.     Service to others  Be of service to others.  There is always someone else in need.  By helping others we can  get out of ourselves  and remember the really important things.  This also provides opportunities for practicing all the other parts of the program.    Humor  Laugh and be silly!  Adjust your TV habits for less news and crime-drama and more comedy.    Control your stress  Try breathing deep, massage therapy, biofeedback, and meditation. Find time to relax each day.     My support system    Clinic Contact:  Phone number:    Contact 1:  Phone number:    Contact 2:  Phone number:    [employer]/:  Phone number:    Therapist:  Phone number:    Local crisis center:    Phone number:    Other community support:  Phone number:

## 2017-08-16 NOTE — MR AVS SNAPSHOT
After Visit Summary   8/16/2017    Brent Walters    MRN: [id number]           Patient Information     Date Of Birth          1985        Visit Information        Provider Department      8/16/2017 3:45 PM Josefina Aquino NP [facility]        Today's Diagnoses     Routine general medical examination at a health care facility    -  1    Female genital symptoms        Major depression in complete remission (H)        Anxiety          Care Instructions      Preventive Health Recommendations  Female Ages 26 - 39  Yearly exam:   See your health care provider every year in order to    Review health changes.     Discuss preventive care.      Review your medicines if you your doctor has prescribed any.    Until age 30: Get a Pap test every three years (more often if you have had an abnormal result).    After age 30: Talk to your doctor about whether you should have a Pap test every 3 years or have a Pap test with HPV screening every 5 years.   You do not need a Pap test if your uterus was removed (hysterectomy) and you have not had cancer.  You should be tested each year for STDs (sexually transmitted diseases), if you're at risk.   Talk to your provider about how often to have your cholesterol checked.  If you are at risk for diabetes, you should have a diabetes test (fasting glucose).  Shots: Get a flu shot each year. Get a tetanus shot every 10 years.   Nutrition:     Eat at least 5 servings of fruits and vegetables each day.    Eat whole-grain bread, whole-wheat pasta and brown rice instead of white grains and rice.    Talk to your provider about Calcium and Vitamin D.     Lifestyle    Exercise at least 150 minutes a week (30 minutes a day, 5 days of the week). This will help you control your weight and prevent disease.    Limit alcohol to one drink per day.    No smoking.     Wear sunscreen to prevent skin cancer.    See your dentist every six months for an exam and  "cleaning.            Follow-ups after your visit        Who to contact     If you have questions or need follow up information about today's clinic visit or your schedule please contact [facility] directly at [phone].  Normal or non-critical lab and imaging results will be communicated to you by MyChart, letter or phone within 4 business days after the clinic has received the results. If you do not hear from us within 7 days, please contact the clinic through [employer] or phone. If you have a critical or abnormal lab result, we will notify you by phone as soon as possible.  Submit refill requests through [employer] or call your pharmacy and they will forward the refill request to us. Please allow 3 business days for your refill to be completed.          Additional Information About Your Visit        MyChart Information     [employer] gives you secure access to your electronic health record. If you see a primary care provider, you can also send messages to your care team and make appointments. If you have questions, please call your primary care clinic.  If you do not have a primary care provider, please call [phone] and they will assist you.        Care EveryWhere ID     This is your Care EveryWhere ID. This could be used by other organizations to access your [place] medical records  [id number]        Your Vitals Were     Pulse Temperature Respirations Height Last Period Pulse Oximetry    86 99  F (37.2  C) (Oral) 12 5' 7.25\" (1.708 m) 07/17/2017 97%    BMI (Body Mass Index)                   34.67 kg/m2            Blood Pressure from Last 3 Encounters:   08/16/17 130/79   05/17/17 100/69   05/10/17 108/78    Weight from Last 3 Encounters:   08/16/17 223 lb (101.2 kg)   05/17/17 217 lb (98.4 kg)   05/10/17 216 lb 8 oz (98.2 kg)              We Performed the Following     DEPRESSION ACTION PLAN (DAP)     HPV High Risk Types DNA Cervical     Pap imaged thin layer screen with HPV - recommended " age 30 - 65          Today's Medication Changes          These changes are accurate as of: 8/16/17  4:42 PM.  If you have any questions, ask your nurse or doctor.               These medicines have changed or have updated prescriptions.        Dose/Directions    citalopram 40 MG tablet   Commonly known as:  celeXA   This may have changed:  See the new instructions.   Used for:  Major depression in complete remission (H), Anxiety   Changed by:  Josefina Aquino NP        TAKE 1 TABLET(40 MG) BY MOUTH DAILY   Quantity:  30 tablet   Refills:  PRN       * metroNIDAZOLE 0.75 % vaginal gel   Commonly known as:  METROGEL   This may have changed:  Another medication with the same name was added. Make sure you understand how and when to take each.   Used for:  Vaginitis and vulvovaginitis   Changed by:  Josefina Aquino NP        PLACE ONE APPLICATORFUL VAGINALLY AT BEDTIME   Quantity:  70 g   Refills:  0       * metroNIDAZOLE 0.75 % vaginal gel   Commonly known as:  METROGEL VAGINAL   This may have changed:  You were already taking a medication with the same name, and this prescription was added. Make sure you understand how and when to take each.   Used for:  Female genital symptoms   Changed by:  Josefina Aquino NP        Dose:  1 applicator   Place 1 applicator (5 g) vaginally At Bedtime for 5 days   Quantity:  70 g   Refills:  1       * Notice:  This list has 2 medication(s) that are the same as other medications prescribed for you. Read the directions carefully, and ask your doctor or other care provider to review them with you.         Where to get your medicines      [facility] [address] [place] [address] [address] AT [facility] & [place] [address] [address], [facility] [address]    Hours:  24-hours Phone:  [phone]     citalopram 40 MG tablet    metroNIDAZOLE 0.75 % vaginal gel                Primary Care Provider Office Phone # Fax #    Josefina VEGA  LUIZ Aquino [phone]       [address] [facility] [address]        Equal Access to Services     MANUEL JOSEPH : Tania cooley jus Sohany, wajaretda luqadaha, qadelmita kaalmada flora, el straussmery low. So [facility] [phone].    ATENCIÓN: Si habla español, tiene a beltrán disposición servicios gratuitos de asistencia lingüística. Llame al [phone].    We comply with applicable federal civil rights laws and [place] laws. We do not discriminate on the basis of race, color, national origin, age, disability sex, sexual orientation or gender identity.            Thank you!     Thank you for choosing [facility]  for your care. Our goal is always to provide you with excellent care. Hearing back from our patients is one way we can continue to improve our services. Please take a few minutes to complete the written survey that you may receive in the mail after your visit with us. Thank you!             Your Updated Medication List - Protect others around you: Learn how to safely use, store and throw away your medicines at www.disposemymeds.org.          This list is accurate as of: 8/16/17  4:42 PM.  Always use your most recent med list.                   Brand Name Dispense Instructions for use Diagnosis    citalopram 40 MG tablet    celeXA    30 tablet    TAKE 1 TABLET(40 MG) BY MOUTH DAILY    Major depression in complete remission (H), Anxiety       LORazepam 0.5 MG tablet    ATIVAN    30 tablet    Take 1 tablet (0.5 mg) by mouth every 6 hours as needed for anxiety    Anxiety       * metroNIDAZOLE 0.75 % vaginal gel    METROGEL    70 g    PLACE ONE APPLICATORFUL VAGINALLY AT BEDTIME    Vaginitis and vulvovaginitis       * metroNIDAZOLE 0.75 % vaginal gel    METROGEL VAGINAL    70 g    Place 1 applicator (5 g) vaginally At Bedtime for 5 days    Female genital symptoms       Multi-vitamin Tabs tablet      Take 1 tablet by mouth daily        tiZANidine 4 MG  tablet    ZANAFLEX    30 tablet    Take 1 tablet (4 mg) by mouth nightly as needed for muscle spasms    Bilateral low back pain without sciatica       * Notice:  This list has 2 medication(s) that are the same as other medications prescribed for you. Read the directions carefully, and ask your doctor or other care provider to review them with you.

## 2017-08-16 NOTE — NURSING NOTE
"Chief Complaint   Patient presents with     Gyn Exam       Initial /79 (BP Location: Left arm)  Pulse 86  Temp 99  F (37.2  C) (Oral)  Resp 12  Ht 5' 7.25\" (1.708 m)  Wt 223 lb (101.2 kg)  LMP 07/17/2017  SpO2 97%  BMI 34.67 kg/m2 Estimated body mass index is 34.67 kg/(m^2) as calculated from the following:    Height as of this encounter: 5' 7.25\" (1.708 m).    Weight as of this encounter: 223 lb (101.2 kg).  Medication Reconciliation: complete    "

## 2017-08-18 LAB
COPATH REPORT: NORMAL
PAP: NORMAL

## 2017-08-21 LAB
FINAL DIAGNOSIS: ABNORMAL
HPV HR 12 DNA CVX QL NAA+PROBE: POSITIVE
HPV16 DNA SPEC QL NAA+PROBE: NEGATIVE
HPV18 DNA SPEC QL NAA+PROBE: NEGATIVE
SPECIMEN DESCRIPTION: ABNORMAL

## 2017-12-13 DIAGNOSIS — F32.5 MAJOR DEPRESSION IN COMPLETE REMISSION (H): ICD-10-CM

## 2017-12-13 DIAGNOSIS — F41.9 ANXIETY: ICD-10-CM

## 2017-12-15 RX ORDER — CITALOPRAM HYDROBROMIDE 40 MG/1
TABLET ORAL
Qty: 90 TABLET | Refills: 1 | Status: SHIPPED | OUTPATIENT
Start: 2017-12-15 | End: 2018-02-14

## 2017-12-26 ENCOUNTER — TRANSFERRED RECORDS (OUTPATIENT)
Dept: HEALTH INFORMATION MANAGEMENT | Facility: CLINIC | Age: 32
End: 2017-12-26

## 2017-12-28 ENCOUNTER — TRANSFERRED RECORDS (OUTPATIENT)
Dept: HEALTH INFORMATION MANAGEMENT | Facility: CLINIC | Age: 32
End: 2017-12-28

## 2018-02-14 ENCOUNTER — OFFICE VISIT (OUTPATIENT)
Dept: FAMILY MEDICINE | Facility: CLINIC | Age: 33
End: 2018-02-14
Payer: COMMERCIAL

## 2018-02-14 VITALS
BODY MASS INDEX: 35.44 KG/M2 | HEART RATE: 87 BPM | WEIGHT: 228 LBS | SYSTOLIC BLOOD PRESSURE: 132 MMHG | OXYGEN SATURATION: 96 % | TEMPERATURE: 97.2 F | DIASTOLIC BLOOD PRESSURE: 85 MMHG | RESPIRATION RATE: 16 BRPM

## 2018-02-14 DIAGNOSIS — R53.83 FATIGUE, UNSPECIFIED TYPE: ICD-10-CM

## 2018-02-14 DIAGNOSIS — F41.9 ANXIETY: ICD-10-CM

## 2018-02-14 DIAGNOSIS — F51.01 PRIMARY INSOMNIA: ICD-10-CM

## 2018-02-14 DIAGNOSIS — F33.1 MAJOR DEPRESSIVE DISORDER, RECURRENT EPISODE, MODERATE (H): Primary | ICD-10-CM

## 2018-02-14 LAB
ERYTHROCYTE [DISTWIDTH] IN BLOOD BY AUTOMATED COUNT: 12.9 % (ref 10–15)
HCT VFR BLD AUTO: 41.1 % (ref 35–47)
HGB BLD-MCNC: 13.2 G/DL (ref 11.7–15.7)
MCH RBC QN AUTO: 30.3 PG (ref 26.5–33)
MCHC RBC AUTO-ENTMCNC: 32.1 G/DL (ref 31.5–36.5)
MCV RBC AUTO: 95 FL (ref 78–100)
PLATELET # BLD AUTO: 286 10E9/L (ref 150–450)
RBC # BLD AUTO: 4.35 10E12/L (ref 3.8–5.2)
WBC # BLD AUTO: 9.3 10E9/L (ref 4–11)

## 2018-02-14 PROCEDURE — 99214 OFFICE O/P EST MOD 30 MIN: CPT | Performed by: NURSE PRACTITIONER

## 2018-02-14 PROCEDURE — 84443 ASSAY THYROID STIM HORMONE: CPT | Performed by: NURSE PRACTITIONER

## 2018-02-14 PROCEDURE — 82306 VITAMIN D 25 HYDROXY: CPT | Performed by: NURSE PRACTITIONER

## 2018-02-14 PROCEDURE — 85027 COMPLETE CBC AUTOMATED: CPT | Performed by: NURSE PRACTITIONER

## 2018-02-14 PROCEDURE — 36415 COLL VENOUS BLD VENIPUNCTURE: CPT | Performed by: NURSE PRACTITIONER

## 2018-02-14 RX ORDER — LORAZEPAM 0.5 MG/1
TABLET ORAL
Qty: 30 TABLET | Refills: 1 | Status: SHIPPED | OUTPATIENT
Start: 2018-02-14 | End: 2018-06-05

## 2018-02-14 RX ORDER — ESCITALOPRAM OXALATE 20 MG/1
20 TABLET ORAL DAILY
Qty: 30 TABLET | Refills: 1 | Status: SHIPPED | OUTPATIENT
Start: 2018-02-14 | End: 2018-04-17

## 2018-02-14 ASSESSMENT — ANXIETY QUESTIONNAIRES
GAD7 TOTAL SCORE: 20
7. FEELING AFRAID AS IF SOMETHING AWFUL MIGHT HAPPEN: MORE THAN HALF THE DAYS
2. NOT BEING ABLE TO STOP OR CONTROL WORRYING: NEARLY EVERY DAY
1. FEELING NERVOUS, ANXIOUS, OR ON EDGE: NEARLY EVERY DAY
3. WORRYING TOO MUCH ABOUT DIFFERENT THINGS: NEARLY EVERY DAY
6. BECOMING EASILY ANNOYED OR IRRITABLE: NEARLY EVERY DAY
5. BEING SO RESTLESS THAT IT IS HARD TO SIT STILL: NEARLY EVERY DAY

## 2018-02-14 ASSESSMENT — PATIENT HEALTH QUESTIONNAIRE - PHQ9: 5. POOR APPETITE OR OVEREATING: NEARLY EVERY DAY

## 2018-02-14 NOTE — MR AVS SNAPSHOT
After Visit Summary   2/14/2018    Brent Walters    MRN: [id number]           Patient Information     Date Of Birth          1985        Visit Information        Provider Department      2/14/2018 3:30 PM Josefina Aquino NP [facility]        Today's Diagnoses     Major depressive disorder, recurrent episode, moderate (H)    -  1    Anxiety        Primary insomnia        Fatigue, unspecified type          Care Instructions    Stop Celexa and start Lexapro.    Follow up in one month.           Follow-ups after your visit        Who to contact     If you have questions or need follow up information about today's clinic visit or your schedule please contact [facility] directly at [phone].  Normal or non-critical lab and imaging results will be communicated to you by [employer], letter or phone within 4 business days after the clinic has received the results. If you do not hear from us within 7 days, please contact the clinic through [employer] or phone. If you have a critical or abnormal lab result, we will notify you by phone as soon as possible.  Submit refill requests through [employer] or call your pharmacy and they will forward the refill request to us. Please allow 3 business days for your refill to be completed.          Additional Information About Your Visit        MyChart Information     [employer] gives you secure access to your electronic health record. If you see a primary care provider, you can also send messages to your care team and make appointments. If you have questions, please call your primary care clinic.  If you do not have a primary care provider, please call [phone] and they will assist you.        Care EveryWhere ID     This is your Care EveryWhere ID. This could be used by other organizations to access your [place] medical records  [id number]        Your Vitals Were     Pulse Temperature Respirations Pulse Oximetry BMI (Body Mass  Index)       87 97.2  F (36.2  C) (Tympanic) 16 96% 35.44 kg/m2        Blood Pressure from Last 3 Encounters:   02/14/18 132/85   08/16/17 130/79   05/17/17 100/69    Weight from Last 3 Encounters:   02/14/18 228 lb (103.4 kg)   08/16/17 223 lb (101.2 kg)   05/17/17 217 lb (98.4 kg)              We Performed the Following     CBC with platelets     TSH with free T4 reflex     Vitamin D Deficiency          Today's Medication Changes          These changes are accurate as of 2/14/18  4:13 PM.  If you have any questions, ask your nurse or doctor.               Start taking these medicines.        Dose/Directions    escitalopram 20 MG tablet   Commonly known as:  LEXAPRO   Used for:  Anxiety, Major depressive disorder, recurrent episode, moderate (H)   Replaces:  citalopram 40 MG tablet   Started by:  Josefina Aquino NP        Dose:  20 mg   Take 1 tablet (20 mg) by mouth daily   Quantity:  30 tablet   Refills:  1         These medicines have changed or have updated prescriptions.        Dose/Directions    LORazepam 0.5 MG tablet   Commonly known as:  ATIVAN   This may have changed:    - how much to take  - how to take this  - when to take this  - reasons to take this  - additional instructions   Used for:  Anxiety   Changed by:  Josefina Aquino NP        Take one tablet as needed for anxiety, not more than 2-3 times a week   Quantity:  30 tablet   Refills:  1         Stop taking these medicines if you haven't already. Please contact your care team if you have questions.     citalopram 40 MG tablet   Commonly known as:  celeXA   Replaced by:  escitalopram 20 MG tablet   Stopped by:  Josefina Aquino NP                Where to get your medicines      [facility] [address] [place] [address] [address] AT [facility] & [place] [address] [address] [facility] [address]    Hours:  24-hours Phone:  [phone]     escitalopram 20 MG tablet    tiZANidine 4 MG tablet          Some of these will need a paper prescription and others can be bought over the counter.  Ask your nurse if you have questions.     Bring a paper prescription for each of these medications     LORazepam 0.5 MG tablet                Primary Care Provider Office Phone # Fax #    Josefina SILVIA Aquino [phone] [address] [place]  [facility] [address]        Equal Access to Services     [facility] OPAL : Hadii aad ku hadasho Soomaali, waaxda luqadaha, qaybta kaalmada adeegyada, waxay idiin hayaan adeeg kharash la'aan . So [facility] [phone].    ATENCIÓN: Si habla espraad, tiene a beltrán disposición servicios gratuitos de asistencia lingüística. [facility] [phone].    We comply with applicable federal civil rights laws and [place] laws. We do not discriminate on the basis of race, color, national origin, age, disability, sex, sexual orientation, or gender identity.            Thank you!     Thank you for choosing [facility]  for your care. Our goal is always to provide you with excellent care. Hearing back from our patients is one way we can continue to improve our services. Please take a few minutes to complete the written survey that you may receive in the mail after your visit with us. Thank you!             Your Updated Medication List - Protect others around you: Learn how to safely use, store and throw away your medicines at www.disposemymeds.org.          This list is accurate as of 2/14/18  4:13 PM.  Always use your most recent med list.                   Brand Name Dispense Instructions for use Diagnosis    escitalopram 20 MG tablet    LEXAPRO    30 tablet    Take 1 tablet (20 mg) by mouth daily    Anxiety, Major depressive disorder, recurrent episode, moderate (H)       LORazepam 0.5 MG tablet    ATIVAN    30 tablet    Take one tablet as needed for anxiety, not more than 2-3 times a week    Anxiety       metroNIDAZOLE 0.75 % vaginal gel    METROGEL    70 g    PLACE ONE  APPLICATORFUL VAGINALLY AT BEDTIME    Vaginitis and vulvovaginitis       Multi-vitamin Tabs tablet      Take 1 tablet by mouth daily        tiZANidine 4 MG tablet    ZANAFLEX    30 tablet    Take 1 tablet (4 mg) by mouth nightly as needed for muscle spasms    Primary insomnia       vitamin B complex with vitamin C Tabs tablet      Take 1 tablet by mouth daily        VITAMIN D (CHOLECALCIFEROL) PO      Take by mouth daily

## 2018-02-14 NOTE — PROGRESS NOTES
SUBJECTIVE:   Brent Walters is a 32 year old female who presents to clinic today for the following health issues:    Depression and Anxiety Follow-Up    Status since last visit: Worsened     Other associated symptoms:None    Complicating factors:     Significant life event: Yes-  Her and her partner broke up, moving soon, daughter moving to go to college    Current substance abuse: None    Having some anxiety and panic attacks    PHQ-9 9/2/2015 7/5/2016 5/10/2017   Total Score 2 7 3   Q9: Suicide Ideation Not at all Not at all Not at all     ELLIOTT-7 SCORE 11/10/2011 1/16/2013   Total Score 5 6       PHQ-9  English  PHQ-9   Any Language  ELLIOTT-7  Suicide Assessment Five-step Evaluation and Treatment (SAFE-T)    Problems taking medications regularly: No    Medication side effects: none    Discuss meds- lorazepam and tizanidine    Depression has been worse since October.   Feeling fatigued, struggles to just get through her day at work and comes home and goes to bed.  Feels at times that she barely has the energy to pick out an outfit.   At night, she can have difficulty sleeping some night, feels anxious, has ruminating thoughts.  She has been going to therapy (every 2 weeks), taking her vitamins (vitamin D 8000 IU daily the whole winter), trying to exercise regularly.  She has been on Celexa for many years.  Tizanidine has helped her fall asleep in the past.   She denies any SI.             Problem list and histories reviewed & adjusted, as indicated.  Additional history: as documented        Reviewed and updated as needed this visit by clinical staff  Tobacco  Allergies  Meds  Med Hx  Surg Hx  Fam Hx  Soc Hx      Reviewed and updated as needed this visit by Provider         ROS:  C: NEGATIVE for fever, chills, change in weight  E/M: NEGATIVE for ear, mouth and throat problems  R: NEGATIVE for significant cough or SOB  CV: NEGATIVE for chest pain, palpitations or peripheral edema  GI: NEGATIVE for nausea,  abdominal pain, heartburn, or change in bowel habits  MUSCULOSKELETAL: NEGATIVE for significant arthralgias or myalgia  NEURO: NEGATIVE for weakness, dizziness or paresthesias  ENDOCRINE: NEGATIVE for temperature intolerance, skin/hair changes  PSYCHIATRIC: see HPI    OBJECTIVE:     /85 (BP Location: Right arm, Patient Position: Chair, Cuff Size: Adult Regular)  Pulse 87  Temp 97.2  F (36.2  C) (Tympanic)  Resp 16  Wt 228 lb (103.4 kg)  SpO2 96%  BMI 35.44 kg/m2  Body mass index is 35.44 kg/(m^2).  GENERAL: healthy, alert and no distress  PSYCH: mentation appears normal, affect flattened, PHQ-9 score of 16; ELLIOTT-7 score of 20         ASSESSMENT/PLAN:             1. Major depressive disorder, recurrent episode, moderate (H)  Worsening  Will change Celexa to Lexapro.  She will continue to see her therapist.  Discussed the use and indication of this medication as well as potential side effects.   Follow up in one month.     - escitalopram (LEXAPRO) 20 MG tablet; Take 1 tablet (20 mg) by mouth daily  Dispense: 30 tablet; Refill: 1    2. Anxiety  See above.  Take Ativan sparingly only if needed for severe anxiety.   - escitalopram (LEXAPRO) 20 MG tablet; Take 1 tablet (20 mg) by mouth daily  Dispense: 30 tablet; Refill: 1  - LORazepam (ATIVAN) 0.5 MG tablet; Take one tablet as needed for anxiety, not more than 2-3 times a week  Dispense: 30 tablet; Refill: 1    3. Primary insomnia    - tiZANidine (ZANAFLEX) 4 MG tablet; Take 1 tablet (4 mg) by mouth nightly as needed for muscle spasms  Dispense: 30 tablet; Refill: 5    4. Fatigue, unspecified type  Likely due to her depression, but will check the following labs to rule out:  - TSH with free T4 reflex - hypothyroidism  - CBC with platelets - anemia  - Vitamin D Deficiency - vit D deficiency        Josefina Aquino NP  [facility]

## 2018-02-15 LAB
DEPRECATED CALCIDIOL+CALCIFEROL SERPL-MC: 36 UG/L (ref 20–75)
TSH SERPL DL<=0.005 MIU/L-ACNC: 2.38 MU/L (ref 0.4–4)

## 2018-02-15 ASSESSMENT — PATIENT HEALTH QUESTIONNAIRE - PHQ9: SUM OF ALL RESPONSES TO PHQ QUESTIONS 1-9: 16

## 2018-02-15 ASSESSMENT — ANXIETY QUESTIONNAIRES: GAD7 TOTAL SCORE: 20

## 2018-04-17 DIAGNOSIS — F33.1 MAJOR DEPRESSIVE DISORDER, RECURRENT EPISODE, MODERATE (H): ICD-10-CM

## 2018-04-17 DIAGNOSIS — F41.9 ANXIETY: ICD-10-CM

## 2018-04-18 NOTE — TELEPHONE ENCOUNTER
"Requested Prescriptions   Pending Prescriptions Disp Refills     escitalopram (LEXAPRO) 20 MG tablet [Pharmacy Med Name: ESCITALOPRAM 20MG TABLETS]    Last Written Prescription Date:  2/14/2018  Last Fill Quantity: 30  Tablets      ,  # refills: 1   Last Office Visit: 2/14/2018   Future Office Visit:      30 tablet 0     Sig: TAKE 1 TABLET(20 MG) BY MOUTH DAILY    SSRIs Protocol Failed    4/17/2018 11:46 PM       Failed - PHQ-9 score less than 5 in past 6 months    Please review last PHQ-9 score.          Passed - Patient is age 18 or older       Passed - No active pregnancy on record       Passed - No positive pregnancy test in last 12 months       Passed - Recent (6 mo) or future (30 days) visit within the authorizing provider's specialty    Patient had office visit in the last 6 months or has a visit in the next 30 days with authorizing provider or within the authorizing provider's specialty.  See \"Patient Info\" tab in inbasket, or \"Choose Columns\" in Meds & Orders section of the refill encounter.            PHQ-9 SCORE 7/5/2016 5/10/2017 2/14/2018   Total Score - - -   Total Score 7 3 16     ELLIOTT-7 SCORE 11/10/2011 1/16/2013 2/14/2018   Total Score 5 6 -   Total Score - - 20             "

## 2018-04-19 RX ORDER — ESCITALOPRAM OXALATE 20 MG/1
TABLET ORAL
Qty: 30 TABLET | Refills: 0 | Status: SHIPPED | OUTPATIENT
Start: 2018-04-19 | End: 2018-05-21

## 2018-04-19 NOTE — TELEPHONE ENCOUNTER
Routing refill request to provider for review/approval because:  -PHQ-9 > 4 and patient overdue for 1 month follow up, per plan from 2/14/18 office visit      Roro/Covering providers-Please sign if agree.  One month supply pended.    Team coordinators-Please contact patient to schedule depression/anxiety follow up visit.    Thank you!  BHARAT CisnerosN, RN

## 2018-05-01 ENCOUNTER — TRANSFERRED RECORDS (OUTPATIENT)
Dept: HEALTH INFORMATION MANAGEMENT | Facility: CLINIC | Age: 33
End: 2018-05-01

## 2018-05-09 ENCOUNTER — TRANSFERRED RECORDS (OUTPATIENT)
Dept: HEALTH INFORMATION MANAGEMENT | Facility: CLINIC | Age: 33
End: 2018-05-09

## 2018-05-22 DIAGNOSIS — F33.1 MAJOR DEPRESSIVE DISORDER, RECURRENT EPISODE, MODERATE (H): ICD-10-CM

## 2018-05-22 DIAGNOSIS — F41.9 ANXIETY: ICD-10-CM

## 2018-05-22 RX ORDER — ESCITALOPRAM OXALATE 20 MG/1
TABLET ORAL
Qty: 0.01 TABLET | Refills: 0 | OUTPATIENT
Start: 2018-05-22

## 2018-05-22 NOTE — TELEPHONE ENCOUNTER
Duplicate - making multiple requests    Closing encounter - no further actions needed at this time    Refused Prescriptions:                       Disp   Refills    escitalopram (LEXAPRO) 20 MG tablet [Pharm*0.01 t*0        Sig: TAKE 1 TABLET(20 MG) BY MOUTH DAILY  Refused By: KILLIAN CASAREZ  Reason for Refusal: Duplicate

## 2018-06-05 ENCOUNTER — OFFICE VISIT (OUTPATIENT)
Dept: FAMILY MEDICINE | Facility: CLINIC | Age: 33
End: 2018-06-05
Payer: COMMERCIAL

## 2018-06-05 VITALS
RESPIRATION RATE: 18 BRPM | WEIGHT: 233 LBS | BODY MASS INDEX: 35.31 KG/M2 | DIASTOLIC BLOOD PRESSURE: 80 MMHG | TEMPERATURE: 98.5 F | OXYGEN SATURATION: 97 % | SYSTOLIC BLOOD PRESSURE: 110 MMHG | HEIGHT: 68 IN | HEART RATE: 88 BPM

## 2018-06-05 DIAGNOSIS — F51.01 PRIMARY INSOMNIA: ICD-10-CM

## 2018-06-05 DIAGNOSIS — F41.9 ANXIETY: ICD-10-CM

## 2018-06-05 DIAGNOSIS — F33.1 MAJOR DEPRESSIVE DISORDER, RECURRENT EPISODE, MODERATE (H): ICD-10-CM

## 2018-06-05 PROCEDURE — 99214 OFFICE O/P EST MOD 30 MIN: CPT | Performed by: NURSE PRACTITIONER

## 2018-06-05 RX ORDER — LORAZEPAM 0.5 MG/1
TABLET ORAL
Qty: 30 TABLET | Refills: 2 | Status: SHIPPED | OUTPATIENT
Start: 2018-06-05 | End: 2018-08-26

## 2018-06-05 RX ORDER — ESCITALOPRAM OXALATE 20 MG/1
20 TABLET ORAL DAILY
Qty: 30 TABLET | Status: SHIPPED | OUTPATIENT
Start: 2018-06-05 | End: 2019-06-25

## 2018-06-05 ASSESSMENT — ANXIETY QUESTIONNAIRES
2. NOT BEING ABLE TO STOP OR CONTROL WORRYING: SEVERAL DAYS
5. BEING SO RESTLESS THAT IT IS HARD TO SIT STILL: MORE THAN HALF THE DAYS
6. BECOMING EASILY ANNOYED OR IRRITABLE: SEVERAL DAYS
1. FEELING NERVOUS, ANXIOUS, OR ON EDGE: SEVERAL DAYS
7. FEELING AFRAID AS IF SOMETHING AWFUL MIGHT HAPPEN: SEVERAL DAYS
3. WORRYING TOO MUCH ABOUT DIFFERENT THINGS: SEVERAL DAYS
GAD7 TOTAL SCORE: 8

## 2018-06-05 ASSESSMENT — PATIENT HEALTH QUESTIONNAIRE - PHQ9: 5. POOR APPETITE OR OVEREATING: SEVERAL DAYS

## 2018-06-05 NOTE — PROGRESS NOTES
"  SUBJECTIVE:   Brent Walters is a 32 year old female who presents to clinic today for the following health issues:      Medication Followup of Lexapro     Taking Medication as prescribed: yes    Side Effects:  None    Medication Helping Symptoms:  Yes    She was changed to Lexapro in February and it is working very well.  Her depression and anxiety is much improved.  Things are also settling down in her life.   She denies any side effects from the medication.     She is taking Ativan about twice a week.  Tizanidine is helpful for sleep.  She has questions about the possibility of trying to get pregnant in the next couple of years.  Periods are regular.       Problem list and histories reviewed & adjusted, as indicated.  Additional history: as documented        Reviewed and updated as needed this visit by clinical staff  Tobacco  Allergies  Meds  Med Hx  Surg Hx  Fam Hx  Soc Hx      Reviewed and updated as needed this visit by Provider         ROS:  CONSTITUTIONAL: NEGATIVE for fever, chills, change in weight  ENT/MOUTH: NEGATIVE for ear, mouth and throat problems  RESP: NEGATIVE for significant cough or SOB  CV: NEGATIVE for chest pain, palpitations or peripheral edema  GI: NEGATIVE for nausea, abdominal pain, heartburn, or change in bowel habits  PSYCHIATRIC: NEGATIVE for changes in mood or affect    OBJECTIVE:     /80  Pulse 88  Temp 98.5  F (36.9  C) (Tympanic)  Resp 18  Ht 5' 8.25\" (1.734 m)  Wt 233 lb (105.7 kg)  SpO2 97%  BMI 35.17 kg/m2  Body mass index is 35.17 kg/(m^2).  GENERAL: healthy, alert and no distress  PSYCH: mentation appears normal, affect normal/bright; PHQ-9 score of 9; ELLIOTT-7 score of 8        ASSESSMENT/PLAN:             1. Anxiety  The current medical regimen is effective;  continue present plan and medications.  Discussed preconception care and use of antidepressants during pregnancy.    - escitalopram (LEXAPRO) 20 MG tablet; Take 1 tablet (20 mg) by mouth daily  " Dispense: 30 tablet; Refill: PRN  - LORazepam (ATIVAN) 0.5 MG tablet; Take one tablet as needed for anxiety, not more than 2-3 times a week  Dispense: 30 tablet; Refill: 2    2. Major depressive disorder, recurrent episode, moderate (H)  The current medical regimen is effective;  continue present plan and medications.   - escitalopram (LEXAPRO) 20 MG tablet; Take 1 tablet (20 mg) by mouth daily  Dispense: 30 tablet; Refill: PRN    3. Primary insomnia  Refills given.   - tiZANidine (ZANAFLEX) 4 MG tablet; Take 1 tablet (4 mg) by mouth nightly as needed for muscle spasms  Dispense: 30 tablet; Refill: PRN        Josefina Aquino NP  [facility]

## 2018-06-05 NOTE — MR AVS SNAPSHOT
"              After Visit Summary   6/5/2018    Brent Walters    MRN: [id number]           Patient Information     Date Of Birth          1985        Visit Information        Provider Department      6/5/2018 2:45 PM Josefina Aquino NP [facility]        Today's Diagnoses     Anxiety        Major depressive disorder, recurrent episode, moderate (H)        Primary insomnia           Follow-ups after your visit        Who to contact     If you have questions or need follow up information about today's clinic visit or your schedule please contact [facility] directly at [phone].  Normal or non-critical lab and imaging results will be communicated to you by Punch Entertainmenthart, letter or phone within 4 business days after the clinic has received the results. If you do not hear from us within 7 days, please contact the clinic through Garenat or phone. If you have a critical or abnormal lab result, we will notify you by phone as soon as possible.  Submit refill requests through netomat or call your pharmacy and they will forward the refill request to us. Please allow 3 business days for your refill to be completed.          Additional Information About Your Visit        MyChart Information     netomat gives you secure access to your electronic health record. If you see a primary care provider, you can also send messages to your care team and make appointments. If you have questions, please call your primary care clinic.  If you do not have a primary care provider, please call [phone] and they will assist you.        Care EveryWhere ID     This is your Care EveryWhere ID. This could be used by other organizations to access your [place] medical records  [id number]        Your Vitals Were     Pulse Temperature Respirations Height Pulse Oximetry BMI (Body Mass Index)    88 98.5  F (36.9  C) (Tympanic) 18 5' 8.25\" (1.734 m) 97% 35.17 kg/m2       Blood Pressure from Last 3 " Encounters:   06/05/18 110/80   02/14/18 132/85   08/16/17 130/79    Weight from Last 3 Encounters:   06/05/18 233 lb (105.7 kg)   02/14/18 228 lb (103.4 kg)   08/16/17 223 lb (101.2 kg)              Today, you had the following     No orders found for display         Today's Medication Changes          These changes are accurate as of 6/5/18  4:15 PM.  If you have any questions, ask your nurse or doctor.               These medicines have changed or have updated prescriptions.        Dose/Directions    escitalopram 20 MG tablet   Commonly known as:  LEXAPRO   This may have changed:  See the new instructions.   Used for:  Anxiety, Major depressive disorder, recurrent episode, moderate (H)   Changed by:  Josefina Aquino NP        Dose:  20 mg   Take 1 tablet (20 mg) by mouth daily   Quantity:  30 tablet   Refills:  PRN            Where to get your medicines      These medications were sent to [facility] [address] [place] [address] [address] [facility] AT [address]  [address] [facility] [address]     Phone:  [phone]     escitalopram 20 MG tablet    tiZANidine 4 MG tablet         Some of these will need a paper prescription and others can be bought over the counter.  Ask your nurse if you have questions.     Bring a paper prescription for each of these medications     LORazepam 0.5 MG tablet                Primary Care Provider Office Phone # Fax #    Josefina Aquino -[phone]       [address] [facility] [address]        Equal Access to Services     [facility] AH: Hadii aad ku hadasho Soomaali, waaxda luqadaha, qaybta kaalmada adeegyada, el magdaleno . So [facility] [phone].    ATENCIÓN: Si habla español, tiene a beltrán disposición servicios gratuitos de asistencia lingüística. Llame al [phone].    We comply with applicable federal civil rights laws and [place] laws. We do not discriminate on the basis  of race, color, national origin, age, disability, sex, sexual orientation, or gender identity.            Thank you!     Thank you for choosing [facility]  for your care. Our goal is always to provide you with excellent care. Hearing back from our patients is one way we can continue to improve our services. Please take a few minutes to complete the written survey that you may receive in the mail after your visit with us. Thank you!             Your Updated Medication List - Protect others around you: Learn how to safely use, store and throw away your medicines at www.disposemymeds.org.          This list is accurate as of 6/5/18  4:15 PM.  Always use your most recent med list.                   Brand Name Dispense Instructions for use Diagnosis    escitalopram 20 MG tablet    LEXAPRO    30 tablet    Take 1 tablet (20 mg) by mouth daily    Anxiety, Major depressive disorder, recurrent episode, moderate (H)       LORazepam 0.5 MG tablet    ATIVAN    30 tablet    Take one tablet as needed for anxiety, not more than 2-3 times a week    Anxiety       tiZANidine 4 MG tablet    ZANAFLEX    30 tablet    Take 1 tablet (4 mg) by mouth nightly as needed for muscle spasms    Primary insomnia

## 2018-06-06 ASSESSMENT — PATIENT HEALTH QUESTIONNAIRE - PHQ9: SUM OF ALL RESPONSES TO PHQ QUESTIONS 1-9: 9

## 2018-06-06 ASSESSMENT — ANXIETY QUESTIONNAIRES: GAD7 TOTAL SCORE: 8

## 2018-06-28 ENCOUNTER — TRANSFERRED RECORDS (OUTPATIENT)
Dept: HEALTH INFORMATION MANAGEMENT | Facility: CLINIC | Age: 33
End: 2018-06-28

## 2018-07-05 ENCOUNTER — TELEPHONE (OUTPATIENT)
Dept: FAMILY MEDICINE | Facility: CLINIC | Age: 33
End: 2018-07-05

## 2018-07-05 DIAGNOSIS — B37.31 YEAST INFECTION OF THE VAGINA: Primary | ICD-10-CM

## 2018-07-05 RX ORDER — FLUCONAZOLE 150 MG/1
150 TABLET ORAL ONCE
Qty: 1 TABLET | Refills: 0 | Status: SHIPPED | OUTPATIENT
Start: 2018-07-05 | End: 2018-07-05

## 2018-07-05 NOTE — TELEPHONE ENCOUNTER
Asking for refill on Diflucan    Diflucan last ordered in 2015. We have not received a request for a refill for this.     PT states that she has a yeast infection. Used OTC but didn't completely get rid of it    Her sx are clumpy white discharge and itching.     She has used a different soap, not sexually active with men.     Pt told she might need appt but PCP would be asked     Sylvie Arteaga RN, BSN

## 2018-07-05 NOTE — TELEPHONE ENCOUNTER
Reason for Call:  Medication or medication refill:    Do you use a [facility]?  Name of the pharmacy and phone number for the current request:  [facility] [address] [place] [address], [place] - [address] [facility] [address] [facility] [address] [place]    Name of the medication requested: fluconazole (DIFLUCAN) 150 MG tablet    Other request: pt stated her pharm req this med over a week ago-nothing on record.Does pt need OV?    Can we leave a detailed message on this number? YES    Phone number patient can be reached at: Home number on file [phone] (home)    Best Time: anytime    Call taken on 7/5/2018 at 12:55 PM by Praveena Berad

## 2018-08-06 ENCOUNTER — TRANSFERRED RECORDS (OUTPATIENT)
Dept: HEALTH INFORMATION MANAGEMENT | Facility: CLINIC | Age: 33
End: 2018-08-06

## 2018-08-13 ENCOUNTER — TELEPHONE (OUTPATIENT)
Dept: FAMILY MEDICINE | Facility: CLINIC | Age: 33
End: 2018-08-13

## 2018-08-13 NOTE — TELEPHONE ENCOUNTER
Routing to PCP to advise. Okay for 15 minutes, where can we work her into your schedule?     Nivia Stevens MA

## 2018-08-13 NOTE — TELEPHONE ENCOUNTER
Reason for Call:  Other call back    Detailed comments: Pt called and is trying to make an appt with [facility]. She was diagnosed with positive HPV and was told to come in by the 16th and [facility] only has 15 min appts. She was coming in for a pappe and discuss her issues. Please Advise thank you    Phone Number Patient can be reached at: Home number on file [phone] (home)    Best Time: anytime    Can we leave a detailed message on this number? YES    Call taken on 8/13/2018 at 2:14 PM by Lindy Ng

## 2018-08-14 ENCOUNTER — OFFICE VISIT (OUTPATIENT)
Dept: FAMILY MEDICINE | Facility: CLINIC | Age: 33
End: 2018-08-14
Payer: COMMERCIAL

## 2018-08-14 VITALS
RESPIRATION RATE: 18 BRPM | HEIGHT: 68 IN | TEMPERATURE: 98.2 F | DIASTOLIC BLOOD PRESSURE: 79 MMHG | WEIGHT: 231.25 LBS | SYSTOLIC BLOOD PRESSURE: 111 MMHG | OXYGEN SATURATION: 97 % | HEART RATE: 91 BPM | BODY MASS INDEX: 35.05 KG/M2

## 2018-08-14 DIAGNOSIS — Z86.19 HISTORY OF HPV INFECTION: ICD-10-CM

## 2018-08-14 DIAGNOSIS — R10.2 PELVIC PAIN IN FEMALE: ICD-10-CM

## 2018-08-14 DIAGNOSIS — F32.5 MAJOR DEPRESSION IN COMPLETE REMISSION (H): ICD-10-CM

## 2018-08-14 DIAGNOSIS — G47.33 OSA (OBSTRUCTIVE SLEEP APNEA): ICD-10-CM

## 2018-08-14 DIAGNOSIS — Z31.89 ENCOUNTER FOR FERTILITY PLANNING: ICD-10-CM

## 2018-08-14 DIAGNOSIS — Z00.00 ROUTINE GENERAL MEDICAL EXAMINATION AT A HEALTH CARE FACILITY: Primary | ICD-10-CM

## 2018-08-14 DIAGNOSIS — E66.811 CLASS 1 OBESITY WITH BODY MASS INDEX (BMI) OF 34.0 TO 34.9 IN ADULT, UNSPECIFIED OBESITY TYPE, UNSPECIFIED WHETHER SERIOUS COMORBIDITY PRESENT: ICD-10-CM

## 2018-08-14 LAB — HBA1C MFR BLD: 5.4 % (ref 0–5.6)

## 2018-08-14 PROCEDURE — 36415 COLL VENOUS BLD VENIPUNCTURE: CPT | Performed by: NURSE PRACTITIONER

## 2018-08-14 PROCEDURE — 83036 HEMOGLOBIN GLYCOSYLATED A1C: CPT | Performed by: NURSE PRACTITIONER

## 2018-08-14 PROCEDURE — 87624 HPV HI-RISK TYP POOLED RSLT: CPT | Performed by: NURSE PRACTITIONER

## 2018-08-14 PROCEDURE — 99213 OFFICE O/P EST LOW 20 MIN: CPT | Mod: 25 | Performed by: NURSE PRACTITIONER

## 2018-08-14 PROCEDURE — 88175 CYTOPATH C/V AUTO FLUID REDO: CPT | Performed by: NURSE PRACTITIONER

## 2018-08-14 PROCEDURE — 99395 PREV VISIT EST AGE 18-39: CPT | Performed by: NURSE PRACTITIONER
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
## 2018-08-14 NOTE — MR AVS SNAPSHOT
After Visit Summary   8/14/2018    Brent Walters    MRN: [id number]           Patient Information     Date Of Birth          1985        Visit Information        Provider Department      8/14/2018 3:00 PM Josefina Aquino NP [facility]        Today's Diagnoses     Routine general medical examination at a health care facility    -  1    SAWYER (obstructive sleep apnea)        Major depression in complete remission (H)        Class 1 obesity with body mass index (BMI) of 34.0 to 34.9 in adult, unspecified obesity type, unspecified whether serious comorbidity present        Encounter for fertility planning        History of HPV infection        Pelvic pain in female          Care Instructions      Preventive Health Recommendations  Female Ages 26 - 39  Yearly exam:   See your health care provider every year in order to    Review health changes.     Discuss preventive care.      Review your medicines if you your doctor has prescribed any.    Until age 30: Get a Pap test every three years (more often if you have had an abnormal result).    After age 30: Talk to your doctor about whether you should have a Pap test every 3 years or have a Pap test with HPV screening every 5 years.   You do not need a Pap test if your uterus was removed (hysterectomy) and you have not had cancer.  You should be tested each year for STDs (sexually transmitted diseases), if you're at risk.   Talk to your provider about how often to have your cholesterol checked.  If you are at risk for diabetes, you should have a diabetes test (fasting glucose).  Shots: Get a flu shot each year. Get a tetanus shot every 10 years.   Nutrition:     Eat at least 5 servings of fruits and vegetables each day.    Eat whole-grain bread, whole-wheat pasta and brown rice instead of white grains and rice.    Get adequate Calcium and Vitamin D.     Lifestyle    Exercise at least 150 minutes a week (30 minutes a day, 5 days of the  week). This will help you control your weight and prevent disease.    Limit alcohol to one drink per day.    No smoking.     Wear sunscreen to prevent skin cancer.    See your dentist every six months for an exam and cleaning.            Follow-ups after your visit        Additional Services     INFERTILITY/AI REFERRAL       Your provider has referred you to: FMG: [facility] [phone]   [email or web address]  FMG: [facility] [phone]   [email or web address]  [facility]: [facility] [phone]   [email or web address]  GICH: [facility] [phone]   [email or web address]  FH[facility]: [facility] [place] [phone]   [email or web address]  [place] [phone]   [email or web address]    Please be aware that coverage of these services is subject to the terms and limitations of your health insurance plan.  Call member services at your health plan with any benefit or coverage questions.      Please bring the following with you to your appointment:    (1) Any X-Rays, CTs or MRIs which have been performed.  Contact the facility where they were done to arrange for  prior to your scheduled appointment.    (2) List of current medications   (3) This referral request   (4) Any documents/labs given to you for this referral                  Future tests that were ordered for you today     Open Future Orders        Priority Expected Expires Ordered    US Pelvic Complete w Transvaginal Routine  8/14/2019 8/14/2018            Who to contact     If you have questions or need follow up information about today's clinic visit or your schedule please contact [facility] directly at [phone].  Normal or non-critical lab and imaging results will be communicated to you by  "MyChart, letter or phone within 4 business days after the clinic has received the results. If you do not hear from us within 7 days, please contact the clinic through PinkUPt or phone. If you have a critical or abnormal lab result, we will notify you by phone as soon as possible.  Submit refill requests through Wrapp or call your pharmacy and they will forward the refill request to us. Please allow 3 business days for your refill to be completed.          Additional Information About Your Visit        AwesomeTouchharStreetLight Data Information     Wrapp gives you secure access to your electronic health record. If you see a primary care provider, you can also send messages to your care team and make appointments. If you have questions, please call your primary care clinic.  If you do not have a primary care provider, please call [phone] and they will assist you.        Care EveryWhere ID     This is your Care EveryWhere ID. This could be used by other organizations to access your [place] medical records  [id number]        Your Vitals Were     Pulse Temperature Respirations Height Last Period Pulse Oximetry    91 98.2  F (36.8  C) (Oral) 18 5' 8.25\" (1.734 m) 07/19/2018 (Exact Date) 97%    BMI (Body Mass Index)                   34.9 kg/m2            Blood Pressure from Last 3 Encounters:   08/14/18 111/79   06/05/18 110/80   02/14/18 132/85    Weight from Last 3 Encounters:   08/14/18 231 lb 4 oz (104.9 kg)   06/05/18 233 lb (105.7 kg)   02/14/18 228 lb (103.4 kg)              We Performed the Following     DEPRESSION ACTION PLAN (DAP)     Hemoglobin A1c     HPV High Risk Types DNA Cervical     INFERTILITY/AI REFERRAL     Pap imaged thin layer diagnostic with HPV (select HPV order below)          Today's Medication Changes          These changes are accurate as of 8/14/18  4:10 PM.  If you have any questions, ask your nurse or doctor.               Start taking these medicines.        Dose/Directions    naltrexone-bupropion " 8-90 MG per 12 hr tablet   Commonly known as:  CONTRAVE   Used for:  Class 1 obesity with body mass index (BMI) of 34.0 to 34.9 in adult, unspecified obesity type, unspecified whether serious comorbidity present   Started by:  Josefina Aquino NP        Take one tablet daily in the morning for one week then increase to one tablet twice daily for one week then increase to 2 tablets in the morning and one in the PM for one week then increase to 2 tablets twice daily   Quantity:  120 tablet   Refills:  2            Where to get your medicines      Some of these will need a paper prescription and others can be bought over the counter.  Ask your nurse if you have questions.     Bring a paper prescription for each of these medications     naltrexone-bupropion 8-90 MG per 12 hr tablet                Primary Care Provider Office Phone # Fax #    Josefina Aquino [phone] [address] [facility] [address]        Equal Access to Services     [facility] : Hadii poly ku hadasho Soomaali, waaxda luqadaha, qaybta kaalmada adeegyada, waxay melissain haymosesn annia magdaleno . So [facility] [phone].    ATENCIÓN: Si habla español, tiene a beltrán disposición servicios gratuitos de asistencia lingüística. Ll[facility] [phone].    We comply with applicable federal civil rights laws and [place] laws. We do not discriminate on the basis of race, color, national origin, age, disability, sex, sexual orientation, or gender identity.            Thank you!     Thank you for choosing [facility]  for your care. Our goal is always to provide you with excellent care. Hearing back from our patients is one way we can continue to improve our services. Please take a few minutes to complete the written survey that you may receive in the mail after your visit with us. Thank you!             Your Updated Medication List - Protect others around you: Learn how to safely use, store and throw away your  medicines at www.disposemymeds.org.          This list is accurate as of 8/14/18  4:10 PM.  Always use your most recent med list.                   Brand Name Dispense Instructions for use Diagnosis    escitalopram 20 MG tablet    LEXAPRO    30 tablet    Take 1 tablet (20 mg) by mouth daily    Anxiety, Major depressive disorder, recurrent episode, moderate (H)       LORazepam 0.5 MG tablet    ATIVAN    30 tablet    Take one tablet as needed for anxiety, not more than 2-3 times a week    Anxiety       naltrexone-bupropion 8-90 MG per 12 hr tablet    CONTRAVE    120 tablet    Take one tablet daily in the morning for one week then increase to one tablet twice daily for one week then increase to 2 tablets in the morning and one in the PM for one week then increase to 2 tablets twice daily    Class 1 obesity with body mass index (BMI) of 34.0 to 34.9 in adult, unspecified obesity type, unspecified whether serious comorbidity present       tiZANidine 4 MG tablet    ZANAFLEX    30 tablet    Take 1 tablet (4 mg) by mouth nightly as needed for muscle spasms    Primary insomnia

## 2018-08-17 LAB
COPATH REPORT: NORMAL
PAP: NORMAL

## 2018-08-20 LAB
FINAL DIAGNOSIS: ABNORMAL
HPV HR 12 DNA CVX QL NAA+PROBE: POSITIVE
HPV16 DNA SPEC QL NAA+PROBE: NEGATIVE
HPV18 DNA SPEC QL NAA+PROBE: NEGATIVE
SPECIMEN DESCRIPTION: ABNORMAL
SPECIMEN SOURCE CVX/VAG CYTO: ABNORMAL

## 2018-08-26 DIAGNOSIS — F41.9 ANXIETY: ICD-10-CM

## 2018-08-27 RX ORDER — LORAZEPAM 0.5 MG/1
TABLET ORAL
Qty: 30 TABLET | Refills: 0 | Status: SHIPPED | OUTPATIENT
Start: 2018-08-27 | End: 2019-07-08

## 2018-08-27 NOTE — TELEPHONE ENCOUNTER
Could offer #30 x 1, further refills per PCP. In RN basket.    Lucius Donohue MD  Family Medicine Physician

## 2018-08-27 NOTE — TELEPHONE ENCOUNTER
Lorazepam      Last Written Prescription Date:  6/5/18  Last Fill Quantity: 30,   # refills: 2  Last Office Visit: 8/14/18  Future Office visit:    Next 5 appointments (look out 90 days)     Aug 29, 2018  2:45 PM CDT   Colposcopy with Joyce Wilson MD, RD [facility]    [address] [place] [address] [phone]                   Routing refill request to provider for review/approval because:  Drug not on the [facility], [facility] or [facility] refill protocol or controlled substance

## 2018-08-29 ENCOUNTER — OFFICE VISIT (OUTPATIENT)
Dept: OBGYN | Facility: CLINIC | Age: 33
End: 2018-08-29
Payer: COMMERCIAL

## 2018-08-29 DIAGNOSIS — B97.7 HIGH RISK HPV INFECTION: ICD-10-CM

## 2018-08-29 DIAGNOSIS — Z13.9 SCREENING PROCEDURE: Primary | ICD-10-CM

## 2018-08-29 LAB — BETA HCG QUAL IFA URINE: NEGATIVE

## 2018-08-29 PROCEDURE — 88305 TISSUE EXAM BY PATHOLOGIST: CPT | Performed by: OBSTETRICS & GYNECOLOGY

## 2018-08-29 PROCEDURE — 57455 BIOPSY OF CERVIX W/SCOPE: CPT | Performed by: OBSTETRICS & GYNECOLOGY

## 2018-08-29 PROCEDURE — 84703 CHORIONIC GONADOTROPIN ASSAY: CPT | Performed by: OBSTETRICS & GYNECOLOGY

## 2018-08-29 NOTE — PROGRESS NOTES
Brent Walters is a 32 year old female No obstetric history on file. who presents for repeat colposcopy, referred by Dr Aquino. Pap smear on 8/14/2018 showed: with high risk HPV present: other HR HPV.     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  UPT today is negative  Patient does not smoke  Type of contraception: none  Age at first sexual intercourse: 13  Number of sexual partners (lifetime): more than 6  Past GYN history: No STD history and Chlamydia  Prior cervical/vaginal disease: QUIQUE 1.  Prior cervical treatment: no treatment.      PROCEDURE:      Before the procedure, it was ensured that the patient was educated regarding the nature of her findings to date, the implications, and what was to be done. She has been made aware of the role of HPV, the natural history of infection, ways to minimize her future risk, the effect of HPV on the cervix, and treatment options available should they be indicated. The details of the colposcopic procedure were reviewed. All questions were answered before proceeding, and informed consent was therefore obtained.      Speculum placed in vagina and excellent visualization of cervix acheived, cervix swabbed x 3 with acetic acid solution.    Mild acetowhitening noted at 7:00 and biopsy was obtained.  Monsol's applied and good hemostasis achieved.    FINDINGS:  Cervix: acetowhitening noted 7:00  Please refer to images section for details.  SCJ seen?: yes   ECC done?: No  Satisfactory examination?: yes      ASSESSMENT: HPV related changes.  PLAN: specimens labelled and sent to Pathology and will base further treatment on Pathology findings      Joyce Wilson MD

## 2018-08-29 NOTE — NURSING NOTE
"Chief Complaint   Patient presents with     Colposcopy       Initial There were no vitals taken for this visit. Estimated body mass index is 34.9 kg/(m^2) as calculated from the following:    Height as of 8/14/18: 5' 8.25\" (1.734 m).    Weight as of 8/14/18: 231 lb 4 oz (104.9 kg).  BP completed using cuff size: regular    No obstetric history on file.    The following HM Due: NONE      The following patient reported/Care Every where data was sent to:  P ABSTRACT QUALITY INITIATIVES [[id number]]  ELE Mohan           "

## 2018-08-29 NOTE — MR AVS SNAPSHOT
After Visit Summary   8/29/2018    Brent Walters    MRN: [id number]           Patient Information     Date Of Birth          1985        Visit Information        Provider Department      8/29/2018 2:45 PM Joyce Wilson MD; RD PROC [facility]        Today's Diagnoses     Screening procedure    -  1    High risk HPV infection           Follow-ups after your visit        Your next 10 appointments already scheduled     Sep 18, 2018  4:00 PM CDT   US PELVIC COMPLETE W TRANSVAGINAL with RDUS1   [facility] ([facility])    [address] [place] [address]  [address] [place] [id number] [phone]           Please bring a list of your medicines (including vitamins, minerals and over-the-counter drugs). Also, tell your doctor about any allergies you may have. Wear comfortable clothes and leave your valuables at home.  Adults: Drink four 8-ounce glasses of fluid an hour before your exam. If you need to empty your bladder before your exam, try to release only a little urine. Then, drink another glass of fluid.  Children: Children who are potty trained up to 6 years old should drink at least 2 cups (16 oz) of water/non-carbonated beverage 30 minutes prior to the exam. Children who are 6-10 years should drink at least 3 cups (24 oz) of water/non-carbonated beverage 45 minutes prior to the exam. Children who are 10 years or older should drink at least 4 cups (32 oz) of water/non-carbonated beverage 45 minutes prior to the exam. If your child is very uncomfortable or has an urgent need to pee, please notify a technologist; they will try to find out how much longer the wait may be and provide instructions to help relieve the pressure.  Please call the Imaging Department at your exam site with any questions.              Who to contact     If you have questions or need follow up information about today's clinic visit or your schedule please contact [facility] directly at [phone].  Normal or non-critical lab and imaging results will be communicated to you by MyChart, letter or phone within 4 business days after the clinic has received the results. If you do not hear from us within 7 days, please contact the clinic through [employer] or phone. If you have a critical or abnormal lab result, we will notify you by phone as soon as possible.  Submit refill requests through [employer] or call your pharmacy and they will forward the refill request to us. Please allow 3 business days for your refill to be completed.          Additional Information About Your Visit        [employer] Information     [employer] gives you secure access to your electronic health record. If you see a primary care provider, you can also send messages to your care team and make appointments. If you have questions, please call your primary care clinic.  If you do not have a primary care provider, please call [phone] and they will assist you.        Care EveryWhere ID     This is your Care EveryWhere ID. This could be used by other organizations to access your [place] medical records  [id number]         Blood Pressure from Last 3 Encounters:   08/14/18 111/79   06/05/18 110/80   02/14/18 132/85    Weight from Last 3 Encounters:   08/14/18 231 lb 4 oz (104.9 kg)   06/05/18 233 lb (105.7 kg)   02/14/18 228 lb (103.4 kg)              We Performed the Following     Beta HCG Qual, Urine - [facility] and [place] ([id number])     COLP CERVIX/UPPER VAGINA W BX CERVIX     Surgical pathology exam        Primary Care Provider Office Phone # Fax #    Josefina Aquino [phone] [address] [facility] [address]        Equal Access to Services     [facility] OPAL : Hadii poly Molina, waaxda luqadaha, qaybta kaalmada anniayada, el low. So [facility] [phone].    ATENCIÓN: Si habla español, tiene a beltrán disposición servicios gratuitos de  asistencia lingüística. Eliot al [phone].    We comply with applicable federal civil rights laws and [place] laws. We do not discriminate on the basis of race, color, national origin, age, disability, sex, sexual orientation, or gender identity.            Thank you!     Thank you for choosing [facility]  for your care. Our goal is always to provide you with excellent care. Hearing back from our patients is one way we can continue to improve our services. Please take a few minutes to complete the written survey that you may receive in the mail after your visit with us. Thank you!             Your Updated Medication List - Protect others around you: Learn how to safely use, store and throw away your medicines at www.disposemymeds.org.          This list is accurate as of 8/29/18 11:59 PM.  Always use your most recent med list.                   Brand Name Dispense Instructions for use Diagnosis    escitalopram 20 MG tablet    LEXAPRO    30 tablet    Take 1 tablet (20 mg) by mouth daily    Anxiety, Major depressive disorder, recurrent episode, moderate (H)       LORazepam 0.5 MG tablet    ATIVAN    30 tablet    TAKE 1 TABLET BY MOUTH AS NEEDED FOR ANXIETY. NOT MORE THAN 2-3 TIMES WEEKLY    Anxiety       naltrexone-bupropion 8-90 MG per 12 hr tablet    CONTRAVE    120 tablet    Take one tablet daily in the morning for one week then increase to one tablet twice daily for one week then increase to 2 tablets in the morning and one in the PM for one week then increase to 2 tablets twice daily    Class 1 obesity with body mass index (BMI) of 34.0 to 34.9 in adult, unspecified obesity type, unspecified whether serious comorbidity present       tiZANidine 4 MG tablet    ZANAFLEX    30 tablet    Take 1 tablet (4 mg) by mouth nightly as needed for muscle spasms    Primary insomnia

## 2018-09-04 LAB — COPATH REPORT: NORMAL

## 2018-09-07 ASSESSMENT — PATIENT HEALTH QUESTIONNAIRE - PHQ9: SUM OF ALL RESPONSES TO PHQ QUESTIONS 1-9: 9

## 2018-09-13 ENCOUNTER — MYC MEDICAL ADVICE (OUTPATIENT)
Dept: FAMILY MEDICINE | Facility: CLINIC | Age: 33
End: 2018-09-13

## 2018-09-13 DIAGNOSIS — B37.31 YEAST INFECTION OF THE VAGINA: Primary | ICD-10-CM

## 2018-09-13 RX ORDER — FLUCONAZOLE 150 MG/1
150 TABLET ORAL ONCE
Qty: 1 TABLET | Refills: 0 | Status: SHIPPED | OUTPATIENT
Start: 2018-09-13 | End: 2018-09-13

## 2018-09-18 ENCOUNTER — RADIANT APPOINTMENT (OUTPATIENT)
Dept: ULTRASOUND IMAGING | Facility: CLINIC | Age: 33
End: 2018-09-18
Attending: NURSE PRACTITIONER
Payer: COMMERCIAL

## 2018-09-18 DIAGNOSIS — R10.2 PELVIC PAIN IN FEMALE: ICD-10-CM

## 2018-09-18 PROCEDURE — 76830 TRANSVAGINAL US NON-OB: CPT

## 2018-11-20 ENCOUNTER — HOSPITAL ENCOUNTER (OUTPATIENT)
Dept: GENERAL RADIOLOGY | Facility: CLINIC | Age: 33
Discharge: HOME | End: 2018-11-20
Attending: OBSTETRICS & GYNECOLOGY | Admitting: OBSTETRICS & GYNECOLOGY
Payer: COMMERCIAL

## 2018-11-20 DIAGNOSIS — Z31.49 INVESTIGATION AND TESTING FOR PROCREATION MANAGEMENT: ICD-10-CM

## 2018-11-20 PROCEDURE — 58340 CATHETER FOR HYSTEROGRAPHY: CPT

## 2018-11-20 PROCEDURE — 25500064 ZZH RX 255 OP 636: Performed by: OBSTETRICS & GYNECOLOGY

## 2018-11-20 RX ORDER — IOPAMIDOL 510 MG/ML
10 INJECTION, SOLUTION INTRAVASCULAR ONCE
Status: COMPLETED | OUTPATIENT
Start: 2018-11-20 | End: 2018-11-20

## 2018-11-20 RX ADMIN — IOPAMIDOL 10 ML: 510 INJECTION, SOLUTION INTRAVASCULAR at 13:45

## 2018-12-12 NOTE — OP NOTE
Procedure Date: 11/20/2018      PREOPERATIVE DIAGNOSIS:  Investigation and testing for procreation management.      POSTOPERATIVE DIAGNOSIS:  Investigation and testing for procreation management.      PROCEDURE:  Hystersalpingogram.      PHYSICIAN:  Saulo Ramos MD      INDICATIONS:  The patient is a 33-year-old G5, 1-0-4-1 who presents for scheduled hystersalpingogram to assess tubal patency prior to undergoing donor intrauterine insemination. The risks, benefits, alternatives of the procedure were discussed with the patient, including risk of infection.  The patient understood and agreed.  All questions were answered.      DESCRIPTION OF PROCEDURE:  The patient was placed in a dorsal lithotomy position.  Speculum was placed in the vagina and the anterior lip of the cervix was grasped with a single-toothed tenaculum.  The HSG balloon catheter was then gently introduced just past the endocervical canal into the intrauterine cavity and the balloon was gently inflated with 1 mL of air using a syringe.  Approximately 30 mL of contrast was then injected into the intrauterine cavity under live fluoroscopic imaging.  The endometrial cavity filled with minimal resistance and was noted to be of normal shape.  Fallopian tubes were noted to opacify with free intraperitoneal spillage of contrast bilaterally, confirming bilateral tubal patency.  There were no filling defects identified.  This is consistent with a normal hysterosalpingogram.  The balloon catheter was deflated and removed.  Inspection of the cervix revealed excellent hemostasis and all instruments were removed from the vagina.  The patient tolerated the procedure well and preliminary results were reviewed with the patient revealing once again bilateral tubal patency and no other abnormalities noted.         SAULO RAMOS MD             D: 12/11/2018   T: 12/12/2018   MT: MARV      Name:     TYREE KATZ   MRN:      [id number]        Account:         [id number]   :      1985           Procedure Date: 2018      Document: [id number]

## 2019-03-11 LAB
HBV SURFACE AG SERPL QL IA: NEGATIVE
HIV 1+2 AB+HIV1 P24 AG SERPL QL IA: NORMAL
RUBELLA ABY IGG: NORMAL

## 2019-03-29 ENCOUNTER — HOSPITAL ENCOUNTER (EMERGENCY)
Facility: CLINIC | Age: 34
Discharge: HOME | End: 2019-03-30
Attending: EMERGENCY MEDICINE | Admitting: EMERGENCY MEDICINE
Payer: COMMERCIAL

## 2019-03-29 DIAGNOSIS — R11.2 NON-INTRACTABLE VOMITING WITH NAUSEA, UNSPECIFIED VOMITING TYPE: ICD-10-CM

## 2019-03-29 LAB
BASOPHILS # BLD AUTO: 0.1 10E9/L (ref 0–0.2)
BASOPHILS NFR BLD AUTO: 0.4 %
DIFFERENTIAL METHOD BLD: ABNORMAL
EOSINOPHIL # BLD AUTO: 0.2 10E9/L (ref 0–0.7)
EOSINOPHIL NFR BLD AUTO: 1.7 %
ERYTHROCYTE [DISTWIDTH] IN BLOOD BY AUTOMATED COUNT: 13.1 % (ref 10–15)
HCT VFR BLD AUTO: 38 % (ref 35–47)
HGB BLD-MCNC: 12.3 G/DL (ref 11.7–15.7)
IMM GRANULOCYTES # BLD: 0.1 10E9/L (ref 0–0.4)
IMM GRANULOCYTES NFR BLD: 0.5 %
LYMPHOCYTES # BLD AUTO: 2.8 10E9/L (ref 0.8–5.3)
LYMPHOCYTES NFR BLD AUTO: 23.5 %
MCH RBC QN AUTO: 30.3 PG (ref 26.5–33)
MCHC RBC AUTO-ENTMCNC: 32.4 G/DL (ref 31.5–36.5)
MCV RBC AUTO: 94 FL (ref 78–100)
MONOCYTES # BLD AUTO: 0.8 10E9/L (ref 0–1.3)
MONOCYTES NFR BLD AUTO: 7.1 %
NEUTROPHILS # BLD AUTO: 7.9 10E9/L (ref 1.6–8.3)
NEUTROPHILS NFR BLD AUTO: 66.8 %
NRBC # BLD AUTO: 0 10*3/UL
NRBC BLD AUTO-RTO: 0 /100
PLATELET # BLD AUTO: 280 10E9/L (ref 150–450)
RBC # BLD AUTO: 4.06 10E12/L (ref 3.8–5.2)
WBC # BLD AUTO: 11.8 10E9/L (ref 4–11)

## 2019-03-29 PROCEDURE — 99284 EMERGENCY DEPT VISIT MOD MDM: CPT | Mod: 25

## 2019-03-29 PROCEDURE — 96374 THER/PROPH/DIAG INJ IV PUSH: CPT

## 2019-03-29 PROCEDURE — 80048 BASIC METABOLIC PNL TOTAL CA: CPT | Performed by: EMERGENCY MEDICINE

## 2019-03-29 PROCEDURE — 84702 CHORIONIC GONADOTROPIN TEST: CPT | Performed by: EMERGENCY MEDICINE

## 2019-03-29 PROCEDURE — 25000128 H RX IP 250 OP 636: Performed by: EMERGENCY MEDICINE

## 2019-03-29 PROCEDURE — 96361 HYDRATE IV INFUSION ADD-ON: CPT

## 2019-03-29 PROCEDURE — 85025 COMPLETE CBC W/AUTO DIFF WBC: CPT | Performed by: EMERGENCY MEDICINE

## 2019-03-29 RX ORDER — METOCLOPRAMIDE HYDROCHLORIDE 5 MG/ML
10 INJECTION INTRAMUSCULAR; INTRAVENOUS ONCE
Status: COMPLETED | OUTPATIENT
Start: 2019-03-29 | End: 2019-03-29

## 2019-03-29 RX ADMIN — SODIUM CHLORIDE 1000 ML: 9 INJECTION, SOLUTION INTRAVENOUS at 23:51

## 2019-03-29 RX ADMIN — METOCLOPRAMIDE 10 MG: 5 INJECTION, SOLUTION INTRAMUSCULAR; INTRAVENOUS at 23:52

## 2019-03-29 NOTE — ED AVS SNAPSHOT
[facility] Emergency Department  [address]  [facility] [address]  Phone:  [phone]  Fax:  [phone]                                    Brent Walters   MRN: [id number]    Department:  [facility] Emergency Department   Date of Visit:  3/29/2019           After Visit Summary Signature Page    I have received my discharge instructions, and my questions have been answered. I have discussed any challenges I see with this plan with the nurse or doctor.    ..........................................................................................................................................  Patient/Patient Representative Signature      ..........................................................................................................................................  Patient Representative Print Name and Relationship to Patient    ..................................................               ................................................  Date                                   Time    ..........................................................................................................................................  Reviewed by Signature/Title    ...................................................              ..............................................  Date                                               Time          22EPIC Rev 08/18

## 2019-03-30 VITALS
WEIGHT: 233.69 LBS | HEART RATE: 67 BPM | DIASTOLIC BLOOD PRESSURE: 69 MMHG | OXYGEN SATURATION: 96 % | RESPIRATION RATE: 18 BRPM | BODY MASS INDEX: 35.27 KG/M2 | TEMPERATURE: 98.6 F | SYSTOLIC BLOOD PRESSURE: 107 MMHG

## 2019-03-30 LAB
ALBUMIN UR-MCNC: NEGATIVE MG/DL
AMORPH CRY #/AREA URNS HPF: ABNORMAL /HPF
ANION GAP SERPL CALCULATED.3IONS-SCNC: 5 MMOL/L (ref 3–14)
APPEARANCE UR: CLEAR
B-HCG SERPL-ACNC: ABNORMAL IU/L (ref 0–5)
BACTERIA #/AREA URNS HPF: ABNORMAL /HPF
BILIRUB UR QL STRIP: NEGATIVE
BUN SERPL-MCNC: 8 MG/DL (ref 7–30)
CALCIUM SERPL-MCNC: 9.5 MG/DL (ref 8.5–10.1)
CHLORIDE SERPL-SCNC: 107 MMOL/L (ref 94–109)
CO2 SERPL-SCNC: 24 MMOL/L (ref 20–32)
COLOR UR AUTO: ABNORMAL
CREAT SERPL-MCNC: 0.57 MG/DL (ref 0.52–1.04)
GFR SERPL CREATININE-BSD FRML MDRD: >90 ML/MIN/{1.73_M2}
GLUCOSE SERPL-MCNC: 119 MG/DL (ref 70–99)
GLUCOSE UR STRIP-MCNC: NEGATIVE MG/DL
HGB UR QL STRIP: ABNORMAL
KETONES UR STRIP-MCNC: NEGATIVE MG/DL
LEUKOCYTE ESTERASE UR QL STRIP: NEGATIVE
NITRATE UR QL: NEGATIVE
PH UR STRIP: 6 PH (ref 5–7)
POTASSIUM SERPL-SCNC: 3.6 MMOL/L (ref 3.4–5.3)
RBC #/AREA URNS AUTO: 0 /HPF (ref 0–2)
SODIUM SERPL-SCNC: 136 MMOL/L (ref 133–144)
SOURCE: ABNORMAL
SP GR UR STRIP: 1.02 (ref 1–1.03)
UROBILINOGEN UR STRIP-MCNC: NORMAL MG/DL (ref 0–2)
WBC #/AREA URNS AUTO: 0 /HPF (ref 0–5)

## 2019-03-30 PROCEDURE — 25000132 ZZH RX MED GY IP 250 OP 250 PS 637: Performed by: EMERGENCY MEDICINE

## 2019-03-30 PROCEDURE — 87086 URINE CULTURE/COLONY COUNT: CPT | Performed by: EMERGENCY MEDICINE

## 2019-03-30 PROCEDURE — 81001 URINALYSIS AUTO W/SCOPE: CPT | Performed by: EMERGENCY MEDICINE

## 2019-03-30 RX ORDER — PYRIDOXINE HCL (VITAMIN B6) 25 MG
25 TABLET ORAL DAILY
Status: DISCONTINUED | OUTPATIENT
Start: 2019-03-30 | End: 2019-03-30 | Stop reason: HOSPADM

## 2019-03-30 RX ADMIN — Medication 25 MG: at 01:53

## 2019-03-30 RX ADMIN — DOXYLAMINE SUCCINATE 25 MG: 25 TABLET ORAL at 01:52

## 2019-03-30 ASSESSMENT — ENCOUNTER SYMPTOMS
DYSURIA: 0
DIFFICULTY URINATING: 0
ABDOMINAL PAIN: 1
DIARRHEA: 0
NAUSEA: 1
FEVER: 0
VOMITING: 1
FREQUENCY: 0
HEMATURIA: 0

## 2019-03-30 NOTE — ED TRIAGE NOTES
Pt 11weeks preg, has been struggling with n/v since 6th week.  Has rx for zofran but it has been increasingly less effective.   Now concern over possible dehydration.

## 2019-03-30 NOTE — ED PROVIDER NOTES
History     Chief Complaint:  Nausea, Vomiting    The history is provided by the patient.      Brent Walters is an 11 weeks pregnant 33 year old female ( A0) who presents to the emergency department for evaluation of nausea, vomiting. The patient reports she has experienced nausea throughout this current pregnancy, but it has worsened in the past week and has been accompanied by multiple episodes of vomiting. She remarks she was also nauseous and with vomiting in her previous pregnancy, though not as severe; she indicates no complications in her first pregnancy, including pre-eclampsia. She states her current nausea and vomiting worsens throughout the day each day, with multiple episodes of vomiting. She further reports mild low abdominal pain. The patient denies any urinary symptoms, fever, diarrhea, or known sick contacts.    Allergies:  Sulfa Drugs     Medications:    Lexapro  Ativan  Naltrexone-Bupropion  Zanaflex     Past Medical History:    Depression  Anxiety  LSIL on Pap smear  SAWYER  HPV    Past Surgical History:    Tummy tuck  D&C  Breast reduction    Family History:    HTN    Social History:  Presents with her significant other.  Never smoker.  Positive for alcohol use.   Marital Status:  Single [1]     Review of Systems   Constitutional: Negative for fever.   Gastrointestinal: Positive for abdominal pain, nausea and vomiting. Negative for diarrhea.   Genitourinary: Negative for decreased urine volume, difficulty urinating, dysuria, frequency, hematuria and urgency.   All other systems reviewed and are negative.    Physical Exam     Patient Vitals for the past 24 hrs:   BP Temp Temp src Pulse Resp SpO2 Weight   19 0430 107/69 -- -- 67 -- 96 % --   19 0415 -- -- -- -- -- 95 % --   19 0400 104/67 -- -- 73 -- 97 % --   19 0345 -- -- -- -- -- 96 % --   19 0330 101/58 -- -- 75 -- 96 % --   19 0300 92/55 -- -- 69 -- 97 % --   19 0152 -- -- -- -- -- 98 % --    03/30/19 0100 109/68 -- -- 64 -- 96 % --   03/30/19 0000 110/84 -- -- 71 -- 94 % --   03/29/19 2319 136/87 98.6  F (37  C) Oral 84 18 99 % 106 kg (233 lb 11 oz)     Physical Exam  Constitutional: Vital signs reviewed as above.  HENT:    Head: No external signs of trauma noted.   Eyes: Conjunctivae are normal. Pupils are equal, round, and reactive to light.    Oropharynx: Normal mucous membranes  Cardiovascular:    Normal rate, regular rhythm and normal heart sounds.     Exam reveals no friction rub.     No murmur heard.  Pulmonary/Chest:    Effort normal and breath sounds normal.    No respiratory distress.    There are no wheezes.    There are no rales.   Gastrointestinal:    Soft.    Bowel sounds normal.    There is no distension.    There is no tenderness.    There is no rebound or guarding.   Musculoskeletal:    Normal range of motion.    Normal Tone  Neurological: Patient is alert and oriented to person, place, and time.   Skin: Skin is warm and dry. Patient is not diaphoretic  Psychiatric: The patient appears calm      Emergency Department Course     Laboratory:  UA with micro: Blood Trace, Bacteria Few, Amorphous Crystals Moderate, o/w negative    CBC: WBC: 11.8 (H), HGB: 12.3, PLT: 280  BMP: Glucose 119 (H), o/w WNL (Creatinine: 0.57)    HCG quantitative: 83234 (H)    Urine culture pending.    Interventions:  2351 NS 1L IV BOLUS  2352 Reglan 10 mg IV  0152 Unisom 25 mg PO  0153 Pyridoxine 25 mg PO    Emergency Department Course:  Nursing notes and vitals reviewed. 0008 I performed an exam of the patient as documented above.     IV inserted. Medicine administered as documented above. Blood drawn. This was sent to the lab for further testing, results above.    The patient provided a urine sample here in the emergency department. This was sent for laboratory testing, findings above.     0345 I rechecked the patient and discussed the results of her workup thus far.     Findings and plan explained to the  Patient. Patient discharged home with instructions regarding supportive care, medications, and reasons to return. The importance of close follow-up was reviewed.     I personally reviewed the laboratory results with the Patient and answered all related questions prior to admission.    Impression & Plan      Medical Decision Making:  This 33-year-old female patient presents the ED due to nausea and vomiting.  Please see the HPI and exam for specifics.  Patient improved with antiemetics in the ED.  I gave her an additional dose of vitamin B6 and doxylamine.  She notes she does take this at home but the initial dose of Reglan that we tried here did not seem to provide as much nausea relief as I would have hoped.  The patient is followed by OB/GYN and at this time, as she feels better and is comfortable going home, I have encouraged follow-up in the clinic with OB.  Anticipatory guidance given prior to discharge.    Diagnosis:    ICD-10-CM   1. Non-intractable vomiting with nausea, unspecified vomiting type R11.2       Disposition:  discharged to home    IBoone, am serving as a scribe on 3/29/2019 at 11:38 PM to personally document services performed by Jayy Penaloza DO based on my observations and the provider's statements to me.     Boone Rivero  3/29/2019   [facility] EMERGENCY DEPARTMENT       Jayy Penaloza DO  03/30/19 0638

## 2019-03-31 LAB
BACTERIA SPEC CULT: NO GROWTH
Lab: NORMAL
SPECIMEN SOURCE: NORMAL

## 2019-04-08 ENCOUNTER — MEDICAL CORRESPONDENCE (OUTPATIENT)
Dept: HEALTH INFORMATION MANAGEMENT | Facility: CLINIC | Age: 34
End: 2019-04-08

## 2019-04-09 ENCOUNTER — INFUSION THERAPY VISIT (OUTPATIENT)
Dept: INFUSION THERAPY | Facility: CLINIC | Age: 34
End: 2019-04-09
Attending: OBSTETRICS & GYNECOLOGY
Payer: COMMERCIAL

## 2019-04-09 VITALS
DIASTOLIC BLOOD PRESSURE: 77 MMHG | HEART RATE: 84 BPM | TEMPERATURE: 97.7 F | OXYGEN SATURATION: 98 % | SYSTOLIC BLOOD PRESSURE: 110 MMHG | RESPIRATION RATE: 16 BRPM

## 2019-04-09 DIAGNOSIS — O21.0 HYPEREMESIS GRAVIDARUM: Primary | ICD-10-CM

## 2019-04-09 DIAGNOSIS — O21.0 HYPEREMESIS GRAVIDARUM: ICD-10-CM

## 2019-04-09 PROCEDURE — 25800030 ZZH RX IP 258 OP 636: Performed by: OBSTETRICS & GYNECOLOGY

## 2019-04-09 PROCEDURE — 96374 THER/PROPH/DIAG INJ IV PUSH: CPT

## 2019-04-09 PROCEDURE — 25000128 H RX IP 250 OP 636: Performed by: OBSTETRICS & GYNECOLOGY

## 2019-04-09 PROCEDURE — 96361 HYDRATE IV INFUSION ADD-ON: CPT

## 2019-04-09 RX ORDER — PRENATAL VIT/IRON FUM/FOLIC AC 27MG-0.8MG
1 TABLET ORAL DAILY
COMMUNITY
End: 2020-10-14

## 2019-04-09 RX ORDER — SENNOSIDES A AND B 8.6 MG/1
2 TABLET, FILM COATED ORAL DAILY PRN
COMMUNITY
End: 2020-10-14

## 2019-04-09 RX ORDER — ONDANSETRON 2 MG/ML
4 INJECTION INTRAMUSCULAR; INTRAVENOUS ONCE
Status: CANCELLED | OUTPATIENT
Start: 2019-04-09

## 2019-04-09 RX ORDER — ONDANSETRON 4 MG/1
4 TABLET, ORALLY DISINTEGRATING ORAL EVERY 6 HOURS PRN
Status: ON HOLD | COMMUNITY
End: 2019-10-15

## 2019-04-09 RX ORDER — ONDANSETRON 2 MG/ML
4 INJECTION INTRAMUSCULAR; INTRAVENOUS ONCE
Status: COMPLETED | OUTPATIENT
Start: 2019-04-09 | End: 2019-04-09

## 2019-04-09 RX ADMIN — SODIUM CHLORIDE, POTASSIUM CHLORIDE, SODIUM LACTATE AND CALCIUM CHLORIDE 2000 ML: 600; 310; 30; 20 INJECTION, SOLUTION INTRAVENOUS at 08:28

## 2019-04-09 RX ADMIN — ONDANSETRON 4 MG: 2 INJECTION INTRAMUSCULAR; INTRAVENOUS at 08:28

## 2019-04-09 ASSESSMENT — PAIN SCALES - GENERAL: PAINLEVEL: NO PAIN (0)

## 2019-04-09 NOTE — PROGRESS NOTES
Infusion Nursing Note:  Brent Walters presents today for IVF, Zofran.    Patient seen by provider today: No   present during visit today: Not Applicable.    Note: Most recent Zofran was last night.    Intravenous Access:  Peripheral IV placed.    Treatment Conditions:  Not Applicable.    Post Infusion Assessment:  Patient tolerated infusion without incident.  Blood return noted pre and post infusion.  Site patent and intact, free from redness, edema or discomfort.  No evidence of extravasations.  Access discontinued per protocol.     Discharge Plan:   Discharge instructions reviewed with: Patient.  Patient and/or family verbalized understanding of discharge instructions and all questions answered.  AVS to patient via [employer].  No further infusion appointments ordered at this time.  Patient discharged in stable condition accompanied by: self.  Departure Mode: Ambulatory.    Eliana Vora RN

## 2019-04-12 ENCOUNTER — MEDICAL CORRESPONDENCE (OUTPATIENT)
Dept: HEALTH INFORMATION MANAGEMENT | Facility: CLINIC | Age: 34
End: 2019-04-12

## 2019-04-12 ENCOUNTER — PRE VISIT (OUTPATIENT)
Dept: MATERNAL FETAL MEDICINE | Facility: CLINIC | Age: 34
End: 2019-04-12

## 2019-04-12 ENCOUNTER — TRANSCRIBE ORDERS (OUTPATIENT)
Dept: MATERNAL FETAL MEDICINE | Facility: CLINIC | Age: 34
End: 2019-04-12

## 2019-04-12 ENCOUNTER — TELEPHONE (OUTPATIENT)
Dept: MATERNAL FETAL MEDICINE | Facility: CLINIC | Age: 34
End: 2019-04-12

## 2019-04-12 DIAGNOSIS — O26.90 PREGNANCY RELATED CONDITION, ANTEPARTUM: Primary | ICD-10-CM

## 2019-04-12 NOTE — TELEPHONE ENCOUNTER
4/12/2019    Called Brent to discuss her recent referral to [facility] for a positive first trimester screen result.  Brent's results came back positive, increased risk for Down syndrome, with a post test risk of 1/240.  We discussed that this result corresponds to a <0.5% chance for the pregnancy to be affected, but does represent an increase in risk for her pregnancy to be affected over her age related risk alone.  Brent reports that she would like to arrange NIPT to clarify this result as quickly as possible, and declined diagnostic testing via [facility] at this time.  Scheduling will contact to arrange a  only appointment for NIPT and a comprehensive ultrasound at a later time.     Keith Mcnally MS, [facility]  Licensed Genetic Counselor  Phone: [phone]  Pager: [phone]

## 2019-04-15 RX ORDER — ONDANSETRON 2 MG/ML
4 INJECTION INTRAMUSCULAR; INTRAVENOUS ONCE
OUTPATIENT
Start: 2019-04-18

## 2019-04-16 ENCOUNTER — HOSPITAL ENCOUNTER (OUTPATIENT)
Dept: LAB | Facility: CLINIC | Age: 34
Discharge: HOME | End: 2019-04-16
Attending: OBSTETRICS & GYNECOLOGY | Admitting: OBSTETRICS & GYNECOLOGY
Payer: COMMERCIAL

## 2019-04-16 ENCOUNTER — OFFICE VISIT (OUTPATIENT)
Dept: MATERNAL FETAL MEDICINE | Facility: CLINIC | Age: 34
End: 2019-04-16
Attending: OBSTETRICS & GYNECOLOGY
Payer: COMMERCIAL

## 2019-04-16 DIAGNOSIS — O26.90 PREGNANCY RELATED CONDITION, ANTEPARTUM: ICD-10-CM

## 2019-04-16 DIAGNOSIS — O28.1 ABNORMAL BIOCHEMICAL FINDING ON ANTENATAL SCREENING OF MOTHER: Primary | ICD-10-CM

## 2019-04-16 DIAGNOSIS — O28.1 ABNORMAL BIOCHEMICAL FINDING ON ANTENATAL SCREENING OF MOTHER: ICD-10-CM

## 2019-04-16 PROCEDURE — 40000791 ZZHCL STATISTIC VERIFI PRENATAL TRISOMY 21,18,13: Performed by: OBSTETRICS & GYNECOLOGY

## 2019-04-16 PROCEDURE — 36415 COLL VENOUS BLD VENIPUNCTURE: CPT | Performed by: OBSTETRICS & GYNECOLOGY

## 2019-04-16 PROCEDURE — 96040 ZZH GENETIC COUNSELING, EACH 30 MINUTES: CPT | Mod: ZF | Performed by: GENETIC COUNSELOR, MS

## 2019-04-16 NOTE — PROGRESS NOTES
[facility]  Genetic Counseling Consult    Patient: Brent Walters YOB: 1985   Date of Service:  19      Brent Walters was seen at the [facility] for genetic consultation given abnormal first trimester screen results.      Impression/Plan:   Brent had a blood draw for NIPT (Innatal test through [facility]).  Results are expected within 7-10 days, and will be available in [place]. We will contact her to discuss the results, and a copy will be forwarded to the office of the referring OB provider.  Brent requests that detailed results, including fetal sex indicated by testing, be left in her voicemail if she cannot be reached.     Pregnancy History:   /Parity:                            Age at Delivery: 33 year old  EMMA: 10/19/2019, by Last Menstrual Period                  Gestational Age: 13w3d  -  No significant complications or exposures were reported in the current pregnancy.    Medical History:   Brent ortiz reported medical history is not expected to impact pregnancy management or risks to fetal development.           Risk Assessment:   We explained that the risk for fetal chromosome abnormalities increases with maternal age. We discussed specific features of common chromosome abnormalities, including Down syndrome, trisomy 13, trisomy 18, and sex chromosome trisomies.      - At age 33 at midtrimester, the risk to have a baby with Down syndrome is 1 in 342.     - At age 33 at midtrimester, the risk to have a baby with any chromosome abnormality is 1 in  172.     -  The patient had a first trimester screen on 49.      - The nuchal translucency (NT) measurement was 1.7 mm, which is less than the 95th percentile   - Chemical values were as follows:    hC.66 MoM    BALJIT-A: 0.80 MoM    TONYA: 2.45 MoM   - The first trimester screen gave the following risk assessments:    Down syndrome risk= 1:240    Trisomy 13/18  risk= 1:10,000   - We discussed that the pattern of higher hCG and lower Leigh Ann-A led to the increased risk result      We discussed that the first trimester screen is designed to sort pregnancies into high risk and low risk categories, and that it does have a risk for false positive results.  Approximately 1 in every 60 women who has a first trimester screen will get a high risk result when their pregnancy is not affected.  The purpose of this screen is to help identify pregnancies that warrant additional testing, and the results of Brent's test indicates that she is at higher risk than her age related risk alone would suggest.  We discussed that a 1/240 risk corresponds to an <0.5% risk for her pregnancy to be affected with Down syndrome, and that this also means that based on the information currently available, we would say there is a 99.5% chance that her pregnancy does not have Down syndrome.  We discussed that definitive testing is available through amniocentesis/chorionic villus sampling, but that these procedures have a small risk for miscarriage associated with them.  At this time, Brent elects to have NIPT done as a way of getting additional clarification without putting the pregnancy at risk.  Please see impression/plan for results disclosure plan.      Testing Options:   We discussed the following options:   First trimester screening    First trimester ultrasound with nuchal translucency and nasal bone assessments, maternal plasma hCG, LEIGH ANN-A, and AFP measurement    Screens for fetal trisomy 21, trisomy 13, and trisomy 18    Cannot screen for open neural tube defects; maternal serum AFP after 15 weeks is recommended       Non-invasive Prenatal Testing (NIPT)    Maternal plasma cell-free DNA testing; first trimester ultrasound with nuchal translucency and nasal bone assessment is recommended, when appropriate    Screens for fetal trisomy 21, trisomy 13, trisomy 18, and sex chromosome aneuploidy    Cannot  screen for open neural tube defects; maternal serum AFP after 15 weeks is recommended       Chorionic villus sampling (CVS)    Invasive procedure typically performed in the first trimester by which placental villi are obtained for the purpose of chromosome analysis and/or other prenatal genetic analysis    Diagnostic results; >99% sensitivity for fetal chromosome abnormalities    Cannot test for open neural tube defects; maternal serum AFP after 15 weeks is recommended       Genetic Amniocentesis    Invasive procedure typically performed in the second trimester by which amniotic fluid is obtained for the purpose of chromosome analysis and/or other prenatal genetic analysis    Diagnostic results; >99% sensitivity for fetal chromosome abnormalities    AFAFP measurement tests for open neural tube defects       Comprehensive (Level II) ultrasound: Detailed ultrasound performed between 18-22 weeks gestation to screen for major birth defects and markers for aneuploidy.        We reviewed the benefits and limitations of this testing.  Screening tests provide a risk assessment specific to the pregnancy for certain fetal chromosome abnormalities, but cannot definitively diagnose or exclude a fetal chromosome abnormality.  Follow-up genetic counseling and consideration of diagnostic testing is recommended with any abnormal screening result.     Diagnostic tests carry inherent risks- including risk of miscarriage- that require careful consideration.  These tests can detect fetal chromosome abnormalities with greater than 99% certainty.  Results can be compromised by maternal cell contamination or mosaicism, and are limited by the resolution of cytogenetic G-banding technology.  There is no screening nor diagnostic test that can detect all forms of birth defects or mental disability.    It was a pleasure to be involved with [facility]. Face-to-face time of the meeting was 30 minutes.  Keith Mcnally MS, [facility]  Licensed  Genetic Counselor  Phone: [phone]  Pager: [phone]

## 2019-04-22 ENCOUNTER — TELEPHONE (OUTPATIENT)
Dept: MATERNAL FETAL MEDICINE | Facility: CLINIC | Age: 34
End: 2019-04-22

## 2019-04-22 LAB — LAB SCANNED RESULT: NORMAL

## 2019-04-22 NOTE — TELEPHONE ENCOUNTER
4/22/2019       Called Brnet to discuss NIPT results.  Results came back negative for chromosome abnormalities in chromosomes 21, 18, & 13, as well as the sex chromosomes.  These test results do not definitively rule out the possibility of one of these conditions, but they do greatly reduce the likelihood.  The test identified sex chromosomes consistent with female sex (XX).  Brent had no questions at this time and was encouraged to reach out if she has any questions or concerns in the future.       Keith Mcnally MS, [facility]  Licensed Genetic Counselor  Phone: [phone]  Pager: [phone]

## 2019-05-21 ENCOUNTER — OFFICE VISIT (OUTPATIENT)
Dept: MATERNAL FETAL MEDICINE | Facility: CLINIC | Age: 34
End: 2019-05-21
Attending: OBSTETRICS & GYNECOLOGY
Payer: COMMERCIAL

## 2019-05-21 ENCOUNTER — HOSPITAL ENCOUNTER (OUTPATIENT)
Dept: ULTRASOUND IMAGING | Facility: CLINIC | Age: 34
Discharge: HOME | End: 2019-05-21
Attending: OBSTETRICS & GYNECOLOGY | Admitting: OBSTETRICS & GYNECOLOGY
Payer: COMMERCIAL

## 2019-05-21 DIAGNOSIS — O26.90 PREGNANCY RELATED CONDITION, ANTEPARTUM: ICD-10-CM

## 2019-05-21 DIAGNOSIS — Z36.89 ENCOUNTER FOR FETAL ANATOMIC SURVEY: Primary | ICD-10-CM

## 2019-05-21 PROCEDURE — 76805 OB US >/= 14 WKS SNGL FETUS: CPT

## 2019-05-21 NOTE — PROGRESS NOTES
"Please see \"Imaging\" tab under \"Chart Review\" for details of today's US at the [facility].    Juan Garrido MD  Maternal-Fetal Medicine    "

## 2019-05-31 ENCOUNTER — PATIENT OUTREACH (OUTPATIENT)
Dept: CARE COORDINATION | Facility: CLINIC | Age: 34
End: 2019-05-31

## 2019-05-31 DIAGNOSIS — Z33.1 PREGNANT STATE, INCIDENTAL: Primary | ICD-10-CM

## 2019-06-03 ASSESSMENT — ACTIVITIES OF DAILY LIVING (ADL): DEPENDENT_IADLS:: INDEPENDENT

## 2019-06-25 DIAGNOSIS — F33.1 MAJOR DEPRESSIVE DISORDER, RECURRENT EPISODE, MODERATE (H): ICD-10-CM

## 2019-06-25 DIAGNOSIS — F41.9 ANXIETY: ICD-10-CM

## 2019-06-25 NOTE — TELEPHONE ENCOUNTER
"Requested Prescriptions   Pending Prescriptions Disp Refills     escitalopram (LEXAPRO) 20 MG tablet [Pharmacy Med Name: ESCITALOPRAM 20MG TABLETS] 30 tablet 0     Sig: TAKE 1 TABLET(20 MG) BY MOUTH DAILY         Last Written Prescription Date:  6/5/18  Last Fill Quantity: 30,   # refills: PRN  Last Office Visit: 8/14/18  Future Office visit:       Routing refill request to provider for review/approval because:  Any pregnancy concerns?  Note on rx says patient reports taking 10 mg?      SSRIs Protocol Failed - 6/25/2019  3:15 AM        Failed - PHQ-9 score less than 5 in past 6 months     Please review last PHQ-9 score.     PHQ-9 SCORE 2/14/2018 6/5/2018 9/6/2018   PHQ-9 Total Score - - -   PHQ-9 Total Score 16 9 9               Failed - No active pregnancy on record        Failed - No positive pregnancy test in last 12 months        Failed - Recent (6 mo) or future (30 days) visit within the authorizing provider's specialty     Patient had office visit in the last 6 months or has a visit in the next 30 days with authorizing provider or within the authorizing provider's specialty.  See \"Patient Info\" tab in inbasket, or \"Choose Columns\" in Meds & Orders section of the refill encounter.            Passed - Medication is active on med list        Passed - Patient is age 18 or older          HP MA - please update PHQ9  "

## 2019-06-25 NOTE — TELEPHONE ENCOUNTER
Did she discuss with her ob/gyn about the medication during pregnancy? They would know best about the safety of this in pregnancy.    Fuad Guerrero

## 2019-07-01 RX ORDER — ESCITALOPRAM OXALATE 20 MG/1
TABLET ORAL
Qty: 30 TABLET | Refills: 0 | Status: SHIPPED | OUTPATIENT
Start: 2019-07-01 | End: 2020-02-13

## 2019-07-03 ASSESSMENT — ANXIETY QUESTIONNAIRES
GAD7 TOTAL SCORE: 5
3. WORRYING TOO MUCH ABOUT DIFFERENT THINGS: SEVERAL DAYS
2. NOT BEING ABLE TO STOP OR CONTROL WORRYING: SEVERAL DAYS
1. FEELING NERVOUS, ANXIOUS, OR ON EDGE: SEVERAL DAYS
7. FEELING AFRAID AS IF SOMETHING AWFUL MIGHT HAPPEN: NOT AT ALL
6. BECOMING EASILY ANNOYED OR IRRITABLE: SEVERAL DAYS
5. BEING SO RESTLESS THAT IT IS HARD TO SIT STILL: NOT AT ALL
IF YOU CHECKED OFF ANY PROBLEMS ON THIS QUESTIONNAIRE, HOW DIFFICULT HAVE THESE PROBLEMS MADE IT FOR YOU TO DO YOUR WORK, TAKE CARE OF THINGS AT HOME, OR GET ALONG WITH OTHER PEOPLE: SOMEWHAT DIFFICULT

## 2019-07-03 ASSESSMENT — PATIENT HEALTH QUESTIONNAIRE - PHQ9
5. POOR APPETITE OR OVEREATING: SEVERAL DAYS
SUM OF ALL RESPONSES TO PHQ QUESTIONS 1-9: 13

## 2019-07-03 NOTE — TELEPHONE ENCOUNTER
Called pt and she told me she was recommended by her Ob/gyn to continue taking Lexapro.    She is schedule for an office visit on /7/8/19 with Psychiatry.    Completed PHQ-9 and ELLIOTT-7 over the phone     Routing to PCP as KELSIE Stevens MA

## 2019-07-04 ASSESSMENT — ANXIETY QUESTIONNAIRES: GAD7 TOTAL SCORE: 5

## 2019-07-08 ENCOUNTER — OFFICE VISIT (OUTPATIENT)
Dept: PSYCHIATRY | Facility: CLINIC | Age: 34
End: 2019-07-08
Attending: NURSE PRACTITIONER
Payer: COMMERCIAL

## 2019-07-08 VITALS
DIASTOLIC BLOOD PRESSURE: 75 MMHG | RESPIRATION RATE: 12 BRPM | OXYGEN SATURATION: 100 % | SYSTOLIC BLOOD PRESSURE: 111 MMHG | BODY MASS INDEX: 35.62 KG/M2 | WEIGHT: 236 LBS | HEART RATE: 108 BPM

## 2019-07-08 DIAGNOSIS — F33.1 MAJOR DEPRESSIVE DISORDER, RECURRENT EPISODE, MODERATE (H): Primary | ICD-10-CM

## 2019-07-08 DIAGNOSIS — F41.1 GENERALIZED ANXIETY DISORDER: ICD-10-CM

## 2019-07-08 PROCEDURE — 90792 PSYCH DIAG EVAL W/MED SRVCS: CPT | Performed by: NURSE PRACTITIONER

## 2019-07-08 ASSESSMENT — ANXIETY QUESTIONNAIRES
2. NOT BEING ABLE TO STOP OR CONTROL WORRYING: SEVERAL DAYS
GAD7 TOTAL SCORE: 6
GAD7 TOTAL SCORE: 6
3. WORRYING TOO MUCH ABOUT DIFFERENT THINGS: SEVERAL DAYS
6. BECOMING EASILY ANNOYED OR IRRITABLE: SEVERAL DAYS
GAD7 TOTAL SCORE: 6
7. FEELING AFRAID AS IF SOMETHING AWFUL MIGHT HAPPEN: NOT AT ALL
5. BEING SO RESTLESS THAT IT IS HARD TO SIT STILL: SEVERAL DAYS
1. FEELING NERVOUS, ANXIOUS, OR ON EDGE: SEVERAL DAYS
4. TROUBLE RELAXING: SEVERAL DAYS
7. FEELING AFRAID AS IF SOMETHING AWFUL MIGHT HAPPEN: NOT AT ALL

## 2019-07-08 ASSESSMENT — PATIENT HEALTH QUESTIONNAIRE - PHQ9
SUM OF ALL RESPONSES TO PHQ QUESTIONS 1-9: 16
SUM OF ALL RESPONSES TO PHQ QUESTIONS 1-9: 16

## 2019-07-08 NOTE — PROGRESS NOTES
"                                                         Outpatient Psychiatric Evaluation - Standard Adult    Name:  Brent Walters  : 1985    Source of Referral:  Primary Care Provider: Josefina Aquino NP   Last visit: 2019  Current Psychotherapist: Padmini Batista   Last visit: Last week, weekly    Identifying Data:  Patient is a 33 year old,    American female  who presents for initial visit with me.  Patient is currently employed full time. Patient attended the session alone. Consent to communicate signed for no-one. Consent for treatment signed and included in electronic medical record. Discussed limits of confidentiality today. My Practice Policy was reviewed and signed.     Patient prefers to be called: \"Ahnya\"    Chief Complaint:    Patient reports: \"Consult on Lexapro. Depression and pregnancy.\"      HPI:    Patient endorses a long-standing history of depression since teenage years. Had been taking Celexa consistently from age 18 to early 2018. States historically Celexa had been helpful; dosed up to 40 mg daily. In winter 2017 she was having a depressive episode and Celexa \"just wasn't cutting it\". So she switched to Lexapro, as similar, and responded well to this for a time. Her dosage was largely 20 mg daily. She overall tolerates Lexapro well, but does wonder if it contributes to fatigue. Has taken in AM on a few occassions and felt quite fatigued; doses at night otherwise.    States depression worsened again 2-3 months ago. Is currently 6 months pregnant and had very significant nausea in first trimester. Continues to struggle with fatigue, discomfort and poor appetite. Feels a bit demoralized as was hoping pregnancy would go smoother and would actually improve her mood. She's also been having issues with her 20 y/o daughter from her first and only other pregnancy; she's becoming a independent adult and transition has had its challenges. Was having hard time getting " "out of bed and eating; feeling bad about herself; no motivation. PCP increased her Lexapro to 30 mg daily a few months ago and she thinks it's somewhat helped. \"Still depressed, but not to point where I can't function.\" Still has fatigue and low appetite, but forcing self to eat. Recently learned she had gestational diabetes; fasting runs 103 to 115; after meals are 140; just another stress; hasn't seen endo yet. OB has bene supportive; helpful to know she's not alone. Has a long-time therapist she's been seeing more regularly at once a week; has been helpful per usual.    Though her depression has relatively improved with increased Lexapro dosage, her anxiety is now feeling more prominent. Having occasional insomnia with running thoughts (though sleep mostly OK), palpitations, jitteriness, shortness of breath. She previously had an Ativan prescription, but stopped taking that when pregnant. She takes Unisom/B6 sometimes for sleep.    She did discuss use of antidepressants during pregnancy with OB and feels she got good info. Agrees it better to treat her mood/anxiety and continued use of antidepressant during pregnancy an acceptable risk, though she does have overall concerns about what she's introducing to her body.    No other antidepressant use. Interestingly, she's used Contrave in the past; had bad headaches and stopped after 2 weeks.    Social/occassional EtOH before pregnancy. No other substance use.      Psychiatric Review of Symptoms:     PHQ-9 scores:   PHQ-9 SCORE 9/6/2018 7/3/2019 7/8/2019   PHQ-9 Total Score - - -   PHQ-9 Total Score MyChart - - 16 (Moderately severe depression)   PHQ-9 Total Score 9 13 16        ELLIOTT-7 scores:    ELLIOTT-7 SCORE 6/5/2018 7/3/2019 7/8/2019   Total Score - - -   Total Score - - 6 (mild anxiety)   Total Score 8 5 6       Psychiatric History:   No hospitalizations  No suicide attempts      Substance Use History:  No ETOH or substance use  Tobacco: no    Past Medical " History:  Past Medical History:   Diagnosis Date     Cervical high risk HPV (human papillomavirus) test positive 08/16/2017, 08/14/18 08/16/17: NIL pap, + HR HPV (not 16 or 18) result.      Depressive disorder      LSIL (low grade squamous intraepithelial lesion) on Pap smear 11/2011    colp - QUIQUE I      Surgery:   Past Surgical History:   Procedure Laterality Date     ABDOMEN SURGERY      Tummy tuck     D & C       SURGICAL HISTORY OF -       tummy tuck     Allergies:     Allergies   Allergen Reactions     Sulfa Drugs      Primary Care Provider: Josefina Aquino NP  Seizures or Head Injury: No      Current Medications:    Current Outpatient Medications:      escitalopram (LEXAPRO) 20 MG tablet, TAKE 1 TABLET(20 MG) BY MOUTH DAILY, Disp: 30 tablet, Rfl: 0     LORazepam (ATIVAN) 0.5 MG tablet, TAKE 1 TABLET BY MOUTH AS NEEDED FOR ANXIETY. NOT MORE THAN 2-3 TIMES WEEKLY (Patient not taking: Reported on 4/9/2019), Disp: 30 tablet, Rfl: 0     naltrexone-bupropion (CONTRAVE) 8-90 MG per 12 hr tablet, Take one tablet daily in the morning for one week then increase to one tablet twice daily for one week then increase to 2 tablets in the morning and one in the PM for one week then increase to 2 tablets twice daily (Patient not taking: Reported on 4/9/2019), Disp: 120 tablet, Rfl: 2     ondansetron (ZOFRAN-ODT) 4 MG ODT tab, Take 4 mg by mouth every 6 hours as needed for nausea, Disp: , Rfl:      Prenatal Vit-Fe Fumarate-FA (PRENATAL MULTIVITAMIN W/IRON) 27-0.8 MG tablet, Take 1 tablet by mouth daily, Disp: , Rfl:      senna (SENOKOT) 8.6 MG tablet, Take 2 tablets by mouth daily as needed for constipation, Disp: , Rfl:      tiZANidine (ZANAFLEX) 4 MG tablet, Take 1 tablet (4 mg) by mouth nightly as needed for muscle spasms (Patient not taking: Reported on 4/9/2019), Disp: 30 tablet, Rfl: PRN    The [place] Prescription Monitoring Program has been reviewed and there are no concerns about diversionary activity for  controlled substances at this time.    Vital Signs:  Vitals: /75 (BP Location: Right arm, Patient Position: Chair, Cuff Size: Adult Large)   Pulse 108   Resp 12   Wt 107 kg (236 lb)   LMP 01/12/2019   SpO2 100%   BMI 35.62 kg/m      Labs:  Most recent laboratory results reviewed and the pertinent results include:     Lab Results   Component Value Date    WBC 11.8 03/29/2019     Lab Results   Component Value Date    RBC 4.06 03/29/2019     Lab Results   Component Value Date    HGB 12.3 03/29/2019     Lab Results   Component Value Date    HCT 38.0 03/29/2019     Lab Results   Component Value Date    MCV 94 03/29/2019     Lab Results   Component Value Date    MCH 30.3 03/29/2019     Lab Results   Component Value Date    MCHC 32.4 03/29/2019     Lab Results   Component Value Date    RDW 13.1 03/29/2019     Lab Results   Component Value Date     03/29/2019         Review of Systems:  10 systems (general, cardiovascular, respiratory, eyes, ENT, endocrine, GI, , M/S, neurological) were reviewed. Most pertinent finding(s) is/are: 6 months pregnant, gestational diabetes, poor appetite, fatigue. The remaining systems are all unremarkable.    Family History:   Patient reported family history includes:   Family History   Problem Relation Age of Onset     Hypertension Father      Cancer Paternal Grandmother         pancreatic     Cerebrovascular Disease Maternal Grandmother      Asthma No family hx of      C.A.D. No family hx of      Breast Cancer No family hx of      Cancer - colorectal No family hx of      Diabetes No family hx of        Social History:   Grew up in [place]    18 y/o daughter; 6 months pregnant  Has BSN degree      Mental Status Examination:     Appearance:  awake, alert and adequately groomed  Attitude:  cooperative   Eye Contact:  good  Gait and Station: Normal  Psychomotor Behavior:  intact station, gait and muscle tone  Oriented to:  time, person, and place  Attention Span  and Concentration:  Normal  Speech:  clear, coherent  Mood:  anxious and depressed  Affect:  appropriate and in normal range  Associations:  no loose associations  Thought Process:  logical, linear and goal oriented  Thought Content:  Appropriate to Interview  Recent and Remote Memory:  intact Not formally assessed. No amnesia.  Fund of Knowledge: appropriate  Insight:  good  Judgment:  intact  Impulse Control:  intact    Suicide Risk Assessment:  Today Brent Walters denies suicidal ideation or self-harm impulses. Therefore, based on all available evidence including the factors cited above, Brent Walters does not appear to be at imminent risk for self-harm, does not meet criteria for a 72-hr hold, and therefore remains appropriate for ongoing outpatient level of care.  A thorough assessment of risk factors related to suicide and self-harm have been reviewed and are noted above. The patient convincingly denies acute suicidality on several occasions. Local community safety resources reviewed and printed for patient to use if needed. There was no deceit detected, and the patient presented in a manner that was believable.     DSM5  Diagnosis:  296.32 (F33.1) Major Depressive Disorder, Recurrent Episode, Moderate _  300.02 (F41.1) Generalized Anxiety Disorder    Medical Comorbidities Include:   Patient Active Problem List    Diagnosis Date Noted     Hyperemesis gravidarum 04/09/2019     Priority: Medium     SAWYER (obstructive sleep apnea) 08/14/2018     Priority: Medium     LSIL (low grade squamous intraepithelial lesion) on Pap smear 11/10/2011     Priority: Medium     11/10/11: LSIL. Plan colp.  12/1/11: [place] - QUIQUE I. Plan pap in 6 & 12 months.  7/23/12: NIL pap. Plan pap in 6 months.  1/2013 Pap Ascus +HPV 34 (probable HR)Per Dr. Ramos. Not one of high risk 14 HPVs so will have pap in 3 yrs.  07/19/16: NIL pap. Plan cotest in 1 year per provider.  08/16/17: NIL pap, + HR HPV (not 16 or 18) result. Plan cotest in 1  year per provider.   08/14/18: NIL pap, + HR HPV (not 16 or 18) result. Plan [place].   08/29/18: [place] Bx neg for dysplasia. Plan cotest in 1 year.        CARDIOVASCULAR SCREENING; LDL GOAL LESS THAN 160 03/08/2011     Priority: Medium     Major depression in complete remission (H) 03/08/2011     Priority: Medium     Anxiety 03/08/2011     Priority: Medium         A 12-item WHODAS 2.0 assessment was completed by the patient today and recorded in EPIC.    WHODAS 2.0 Total Score 7/8/2019   Total Score MyChart Incomplete       The Patient Activation Measure (NAOMI) score was completed and recorded in [employer]. This assesses patient knowledge, skill, and confidence for self-management.   NAOMI Score (Last Two) 3/8/2011   NAOMI Raw Score 44   Activation Score 70.8   NAOMI Level 4                  Impression:  Brent Walters is a 33 year old female with a long-standing history of recurrent depression and anxiety. She is now 6 months pregnant and decided to remain on Lexapro, though she reasonably stopped Ativan. Her Lexapro is maximized at 30 mg daily, and she's had a moderate stabilization to her mood, though has residual anxiety symptoms. Her chief issues appear to be fatigue and poor appetite. I do wonder how much Lexapro is contributing to her fatigue, though this would appear multifactorial. I am encouraged she has a supportive OB, PCP and therapist.    We discussed her options:    1) Continue on Lexapro 30 mg as she has been.  2) Augment Lexapro with Wellbutrin. This may help with her mood and energy, though risks worsening her anxiety and appetite. As it regards safety during pregnancy, bupropion during the first trimester did not reveal an increased risk of malformations in the bupropion-exposed group of infants nor did it demonstrate an increased risk for cardiovascular malformations.  3) Change from Lexapro to Prozac. She may find this more effective, and less fatiguing, though this is an untested antidepressant in her case,  and would entail a multi-week cross-taper. Prozac is well studied in its relative safety during pregnancy.    If she were not pregnant, I might also consider Buspar, but as there is no data on its safety during pregnancy, it's generally not recommended. I forwarded the website for [facility] to fully educate herself on medication use during pregnancy.    She'll think over these options some more and get back to me if she has additional questions or has a decision.    Medication side effects and alternatives reviewed. Health promotion activities recommended and reviewed today. All questions addressed. Education and counseling completed regarding risks and benefits of medications and psychotherapy options. Collaborative Care Psychiatry Service model reviewed today. Recommend therapy for additional support.       Treatment Plan:    Continue Lexapro 30 mg daily    Consider psychopharm options and keep in touch    Continue all other medical directions per primary care provider.     Continue all other medications as reviewed per electronic medical record today.     Safety plan reviewed. To the Emergency Department as needed or call [phone]. [place] Crisis Text Line: Text [place] to 914422  or  Suicide LifeLine Chat: suicidepreventionlifeline.org/chat/    Schedule an appointment with me in 2 months or sooner as needed.  Call [facility] at [phone] to schedule.    Follow up with primary care provider as planned or for acute medical concerns.    Call the psychiatric nurse line with medication questions or concerns at [phone].    [employer] may be used to communicate with your provider, but this is not intended to be used for emergencies.      Crisis Resources:    National Suicide Prevention Lifeline: 655.945.7324 (TTY: 362.750.1614). Call anytime for help.  (www.suicidepreventionlifeline.org)  [place] [employer] [place]ness (www.esvin.org): 160.620.7985 or 194-256-2355.    Mental Health Association (www.mentalhealth.org): 718-138-7335 or 094-274-6358.  [place] Crisis Text Line: Text [place] to 898893  Suicide LifeLine Chat: suicidepreBuck [employer] and Saloonline.org/chat    Administrative Billing:   Time spent with patient was 60 minutes and greater than 50% of time or 40 minutes was spent in counseling and coordination of care regarding above diagnoses and treatment plan.    Patient Status:  Patient will continue to be seen for ongoing consultation and stabilization.    Signed:   Josh Flores, CNP  Psychiatry

## 2019-07-09 ASSESSMENT — ANXIETY QUESTIONNAIRES: GAD7 TOTAL SCORE: 6

## 2019-07-09 ASSESSMENT — PATIENT HEALTH QUESTIONNAIRE - PHQ9: SUM OF ALL RESPONSES TO PHQ QUESTIONS 1-9: 16

## 2019-08-14 ENCOUNTER — PATIENT OUTREACH (OUTPATIENT)
Dept: CARE COORDINATION | Facility: CLINIC | Age: 34
End: 2019-08-14

## 2019-08-15 ASSESSMENT — ACTIVITIES OF DAILY LIVING (ADL): DEPENDENT_IADLS:: INDEPENDENT

## 2019-09-23 ENCOUNTER — HOSPITAL ENCOUNTER (OUTPATIENT)
Dept: ULTRASOUND IMAGING | Facility: CLINIC | Age: 34
Discharge: HOME | End: 2019-09-23
Attending: OBSTETRICS & GYNECOLOGY | Admitting: OBSTETRICS & GYNECOLOGY
Payer: COMMERCIAL

## 2019-09-23 ENCOUNTER — TRANSCRIBE ORDERS (OUTPATIENT)
Dept: MATERNAL FETAL MEDICINE | Facility: CLINIC | Age: 34
End: 2019-09-23

## 2019-09-23 ENCOUNTER — OFFICE VISIT (OUTPATIENT)
Dept: MATERNAL FETAL MEDICINE | Facility: CLINIC | Age: 34
End: 2019-09-23
Attending: OBSTETRICS & GYNECOLOGY
Payer: COMMERCIAL

## 2019-09-23 DIAGNOSIS — O24.414 INSULIN CONTROLLED GESTATIONAL DIABETES MELLITUS (GDM) IN THIRD TRIMESTER: Primary | ICD-10-CM

## 2019-09-23 DIAGNOSIS — O26.90 PREGNANCY RELATED CONDITION, ANTEPARTUM: ICD-10-CM

## 2019-09-23 DIAGNOSIS — O26.90 PREGNANCY RELATED CONDITION, ANTEPARTUM: Primary | ICD-10-CM

## 2019-09-23 PROCEDURE — 76819 FETAL BIOPHYS PROFIL W/O NST: CPT

## 2019-09-23 NOTE — PROGRESS NOTES
Please see ultrasound report under imaging tab for details on ultrasound performed today.    Joyce Larson MD  , OB/GYN  Maternal-Fetal Medicine  [email or web address]  [phone] (Academic office)  [phone] (Pager)

## 2019-09-26 LAB — GROUP B STREP PCR: NEGATIVE

## 2019-10-04 ENCOUNTER — HEALTH MAINTENANCE LETTER (OUTPATIENT)
Age: 34
End: 2019-10-04

## 2019-10-12 ENCOUNTER — HOSPITAL ENCOUNTER (INPATIENT)
Facility: CLINIC | Age: 34
LOS: 3 days | Discharge: HOME | End: 2019-10-15
Attending: OBSTETRICS & GYNECOLOGY | Admitting: OBSTETRICS & GYNECOLOGY
Payer: COMMERCIAL

## 2019-10-12 ENCOUNTER — ANESTHESIA EVENT (OUTPATIENT)
Dept: OBGYN | Facility: CLINIC | Age: 34
End: 2019-10-12
Payer: COMMERCIAL

## 2019-10-12 ENCOUNTER — ANESTHESIA (OUTPATIENT)
Dept: OBGYN | Facility: CLINIC | Age: 34
End: 2019-10-12
Payer: COMMERCIAL

## 2019-10-12 PROBLEM — O24.419 GESTATIONAL DIABETES: Status: ACTIVE | Noted: 2019-10-12

## 2019-10-12 LAB
ABO + RH BLD: NORMAL
ABO + RH BLD: NORMAL
GLUCOSE BLDC GLUCOMTR-MCNC: 71 MG/DL (ref 70–99)
GLUCOSE BLDC GLUCOMTR-MCNC: 71 MG/DL (ref 70–99)
GLUCOSE BLDC GLUCOMTR-MCNC: 72 MG/DL (ref 70–99)
GLUCOSE BLDC GLUCOMTR-MCNC: 72 MG/DL (ref 70–99)
GLUCOSE BLDC GLUCOMTR-MCNC: 74 MG/DL (ref 70–99)
GLUCOSE BLDC GLUCOMTR-MCNC: 82 MG/DL (ref 70–99)
GLUCOSE BLDC GLUCOMTR-MCNC: 87 MG/DL (ref 70–99)
GLUCOSE BLDC GLUCOMTR-MCNC: 91 MG/DL (ref 70–99)
GLUCOSE BLDC GLUCOMTR-MCNC: 93 MG/DL (ref 70–99)
GLUCOSE BLDC GLUCOMTR-MCNC: 98 MG/DL (ref 70–99)
SPECIMEN EXP DATE BLD: NORMAL

## 2019-10-12 PROCEDURE — 3E033VJ INTRODUCTION OF OTHER HORMONE INTO PERIPHERAL VEIN, PERCUTANEOUS APPROACH: ICD-10-PCS | Performed by: OBSTETRICS & GYNECOLOGY

## 2019-10-12 PROCEDURE — 36415 COLL VENOUS BLD VENIPUNCTURE: CPT | Performed by: OBSTETRICS & GYNECOLOGY

## 2019-10-12 PROCEDURE — 40000671 ZZH STATISTIC ANESTHESIA CASE

## 2019-10-12 PROCEDURE — 25000132 ZZH RX MED GY IP 250 OP 250 PS 637: Performed by: OBSTETRICS & GYNECOLOGY

## 2019-10-12 PROCEDURE — 37000011 ZZH ANESTHESIA WARD SERVICE

## 2019-10-12 PROCEDURE — 25000128 H RX IP 250 OP 636: Performed by: OBSTETRICS & GYNECOLOGY

## 2019-10-12 PROCEDURE — 25000125 ZZHC RX 250: Performed by: OBSTETRICS & GYNECOLOGY

## 2019-10-12 PROCEDURE — 86900 BLOOD TYPING SEROLOGIC ABO: CPT | Performed by: OBSTETRICS & GYNECOLOGY

## 2019-10-12 PROCEDURE — 86780 TREPONEMA PALLIDUM: CPT | Performed by: OBSTETRICS & GYNECOLOGY

## 2019-10-12 PROCEDURE — 86901 BLOOD TYPING SEROLOGIC RH(D): CPT | Performed by: OBSTETRICS & GYNECOLOGY

## 2019-10-12 PROCEDURE — 00000146 ZZHCL STATISTIC GLUCOSE BY METER IP

## 2019-10-12 PROCEDURE — 12000000 ZZH R&B MED SURG/OB

## 2019-10-12 PROCEDURE — 25800030 ZZH RX IP 258 OP 636: Performed by: OBSTETRICS & GYNECOLOGY

## 2019-10-12 RX ORDER — PHENYLEPHRINE HCL IN 0.9% NACL 1 MG/10 ML
100 SYRINGE (ML) INTRAVENOUS EVERY 5 MIN PRN
Status: DISCONTINUED | OUTPATIENT
Start: 2019-10-12 | End: 2019-10-13

## 2019-10-12 RX ORDER — FENTANYL CITRATE 50 UG/ML
INJECTION, SOLUTION INTRAMUSCULAR; INTRAVENOUS
Status: DISCONTINUED
Start: 2019-10-12 | End: 2019-10-13 | Stop reason: HOSPADM

## 2019-10-12 RX ORDER — OXYCODONE AND ACETAMINOPHEN 5; 325 MG/1; MG/1
1 TABLET ORAL
Status: DISCONTINUED | OUTPATIENT
Start: 2019-10-12 | End: 2019-10-13

## 2019-10-12 RX ORDER — DEXTROSE MONOHYDRATE 25 G/50ML
25-50 INJECTION, SOLUTION INTRAVENOUS
Status: DISCONTINUED | OUTPATIENT
Start: 2019-10-12 | End: 2019-10-13

## 2019-10-12 RX ORDER — ESCITALOPRAM OXALATE 5 MG/1
5 TABLET ORAL DAILY
Status: DISCONTINUED | OUTPATIENT
Start: 2019-10-12 | End: 2019-10-13

## 2019-10-12 RX ORDER — METHYLERGONOVINE MALEATE 0.2 MG/ML
200 INJECTION INTRAVENOUS
Status: DISCONTINUED | OUTPATIENT
Start: 2019-10-12 | End: 2019-10-13

## 2019-10-12 RX ORDER — IBUPROFEN 800 MG/1
800 TABLET, FILM COATED ORAL
Status: DISCONTINUED | OUTPATIENT
Start: 2019-10-12 | End: 2019-10-13

## 2019-10-12 RX ORDER — FENTANYL/BUPIVACAINE/NS/PF 2-1250MCG
PLASTIC BAG, INJECTION (ML) INJECTION
Status: DISCONTINUED
Start: 2019-10-12 | End: 2019-10-13 | Stop reason: HOSPADM

## 2019-10-12 RX ORDER — SODIUM CHLORIDE, SODIUM LACTATE, POTASSIUM CHLORIDE, CALCIUM CHLORIDE 600; 310; 30; 20 MG/100ML; MG/100ML; MG/100ML; MG/100ML
INJECTION, SOLUTION INTRAVENOUS CONTINUOUS
Status: DISCONTINUED | OUTPATIENT
Start: 2019-10-12 | End: 2019-10-13

## 2019-10-12 RX ORDER — OXYTOCIN/0.9 % SODIUM CHLORIDE 30/500 ML
100-340 PLASTIC BAG, INJECTION (ML) INTRAVENOUS CONTINUOUS PRN
Status: COMPLETED | OUTPATIENT
Start: 2019-10-12 | End: 2019-10-13

## 2019-10-12 RX ORDER — LIDOCAINE 40 MG/G
CREAM TOPICAL
Status: DISCONTINUED | OUTPATIENT
Start: 2019-10-12 | End: 2019-10-13

## 2019-10-12 RX ORDER — ESCITALOPRAM OXALATE 20 MG/1
20 TABLET ORAL DAILY
Status: DISCONTINUED | OUTPATIENT
Start: 2019-10-12 | End: 2019-10-12 | Stop reason: DRUGHIGH

## 2019-10-12 RX ORDER — OXYTOCIN/0.9 % SODIUM CHLORIDE 30/500 ML
1-24 PLASTIC BAG, INJECTION (ML) INTRAVENOUS CONTINUOUS
Status: DISCONTINUED | OUTPATIENT
Start: 2019-10-12 | End: 2019-10-13

## 2019-10-12 RX ORDER — DEXTROSE, SODIUM CHLORIDE, SODIUM LACTATE, POTASSIUM CHLORIDE, AND CALCIUM CHLORIDE 5; .6; .31; .03; .02 G/100ML; G/100ML; G/100ML; G/100ML; G/100ML
INJECTION, SOLUTION INTRAVENOUS CONTINUOUS
Status: DISCONTINUED | OUTPATIENT
Start: 2019-10-12 | End: 2019-10-13

## 2019-10-12 RX ORDER — FENTANYL CITRATE 50 UG/ML
50-100 INJECTION, SOLUTION INTRAMUSCULAR; INTRAVENOUS
Status: DISCONTINUED | OUTPATIENT
Start: 2019-10-12 | End: 2019-10-13

## 2019-10-12 RX ORDER — NICOTINE POLACRILEX 4 MG
15-30 LOZENGE BUCCAL
Status: DISCONTINUED | OUTPATIENT
Start: 2019-10-12 | End: 2019-10-13

## 2019-10-12 RX ORDER — BUPIVACAINE HYDROCHLORIDE 2.5 MG/ML
10 INJECTION, SOLUTION EPIDURAL; INFILTRATION; INTRACAUDAL ONCE
Status: COMPLETED | OUTPATIENT
Start: 2019-10-13 | End: 2019-10-13

## 2019-10-12 RX ORDER — SODIUM CHLORIDE 9 MG/ML
INJECTION, SOLUTION INTRAVENOUS CONTINUOUS
Status: DISCONTINUED | OUTPATIENT
Start: 2019-10-12 | End: 2019-10-13

## 2019-10-12 RX ORDER — CARBOPROST TROMETHAMINE 250 UG/ML
250 INJECTION, SOLUTION INTRAMUSCULAR
Status: DISCONTINUED | OUTPATIENT
Start: 2019-10-12 | End: 2019-10-13

## 2019-10-12 RX ORDER — ACETAMINOPHEN 325 MG/1
325-650 TABLET ORAL EVERY 6 HOURS PRN
COMMUNITY
End: 2020-10-14

## 2019-10-12 RX ORDER — ACETAMINOPHEN 325 MG/1
650 TABLET ORAL EVERY 4 HOURS PRN
Status: DISCONTINUED | OUTPATIENT
Start: 2019-10-12 | End: 2019-10-13

## 2019-10-12 RX ORDER — LIDOCAINE HYDROCHLORIDE AND EPINEPHRINE 15; 5 MG/ML; UG/ML
3 INJECTION, SOLUTION EPIDURAL
Status: DISCONTINUED | OUTPATIENT
Start: 2019-10-12 | End: 2019-10-13

## 2019-10-12 RX ORDER — OXYTOCIN 10 [USP'U]/ML
10 INJECTION, SOLUTION INTRAMUSCULAR; INTRAVENOUS
Status: DISCONTINUED | OUTPATIENT
Start: 2019-10-12 | End: 2019-10-13

## 2019-10-12 RX ORDER — ONDANSETRON 2 MG/ML
4 INJECTION INTRAMUSCULAR; INTRAVENOUS EVERY 6 HOURS PRN
Status: DISCONTINUED | OUTPATIENT
Start: 2019-10-12 | End: 2019-10-13

## 2019-10-12 RX ORDER — NALOXONE HYDROCHLORIDE 0.4 MG/ML
.1-.4 INJECTION, SOLUTION INTRAMUSCULAR; INTRAVENOUS; SUBCUTANEOUS
Status: DISCONTINUED | OUTPATIENT
Start: 2019-10-12 | End: 2019-10-13

## 2019-10-12 RX ORDER — NALBUPHINE HYDROCHLORIDE 10 MG/ML
2.5-5 INJECTION, SOLUTION INTRAMUSCULAR; INTRAVENOUS; SUBCUTANEOUS EVERY 6 HOURS PRN
Status: DISCONTINUED | OUTPATIENT
Start: 2019-10-12 | End: 2019-10-13

## 2019-10-12 RX ADMIN — RANITIDINE 75 MG: 75 TABLET ORAL at 22:37

## 2019-10-12 RX ADMIN — FENTANYL CITRATE 100 MCG: 50 INJECTION INTRAMUSCULAR; INTRAVENOUS at 21:56

## 2019-10-12 RX ADMIN — SODIUM CHLORIDE, SODIUM LACTATE, POTASSIUM CHLORIDE, CALCIUM CHLORIDE AND DEXTROSE MONOHYDRATE: 5; 600; 310; 30; 20 INJECTION, SOLUTION INTRAVENOUS at 22:47

## 2019-10-12 RX ADMIN — FENTANYL CITRATE 100 MCG: 50 INJECTION INTRAMUSCULAR; INTRAVENOUS at 23:09

## 2019-10-12 RX ADMIN — ESCITALOPRAM OXALATE 20 MG: 20 TABLET ORAL at 22:38

## 2019-10-12 RX ADMIN — Medication 1 MILLI-UNITS/MIN: at 09:39

## 2019-10-12 RX ADMIN — SODIUM CHLORIDE, POTASSIUM CHLORIDE, SODIUM LACTATE AND CALCIUM CHLORIDE: 600; 310; 30; 20 INJECTION, SOLUTION INTRAVENOUS at 21:57

## 2019-10-12 RX ADMIN — FENTANYL CITRATE 100 MCG: 50 INJECTION INTRAMUSCULAR; INTRAVENOUS at 20:31

## 2019-10-12 RX ADMIN — SODIUM CHLORIDE, POTASSIUM CHLORIDE, SODIUM LACTATE AND CALCIUM CHLORIDE: 600; 310; 30; 20 INJECTION, SOLUTION INTRAVENOUS at 14:39

## 2019-10-12 RX ADMIN — SODIUM CHLORIDE, POTASSIUM CHLORIDE, SODIUM LACTATE AND CALCIUM CHLORIDE: 600; 310; 30; 20 INJECTION, SOLUTION INTRAVENOUS at 09:46

## 2019-10-12 ASSESSMENT — MIFFLIN-ST. JEOR: SCORE: 1820.12

## 2019-10-12 NOTE — PLAN OF CARE
Contractions getting stronger. Prefers no pain medication in labor. Warm pack to back. Activity encouraged.

## 2019-10-12 NOTE — H&P
There has been no significant change in Brent's general health status based upon review of the prenatal records, nursing database and exam.    Brent is a 33 year old [id number] IUP at 39 weeks who has been admitted for induction due to gestational diabetes requiring 52 units NPH at bedtime. This am FBS= 102. She ate yogurt for breakfast.    Is currently taking Lexapro 30mg for depression.  Is in a same sex relationship. This pregnancy was conceived via IUI,    Cervix= 2cm/70%/-2 arom= ?successful    A: 32 yo [id number] IUP 39 weeks       Gestational diabetes on insulin       Admitted for induction    P: pitocin       Pain management per patient request      Diabetes labor protocol

## 2019-10-12 NOTE — PROVIDER NOTIFICATION
Tamir every 2-4.5 minutes. Admits to being hungry. Blood sugar 72. Dr. Villeda updated. Order received, patient may have crackers and peanut butter.then recheck blood sugar in 2 hours, then hourly after that and to continue Pitocin at 18 jacqueline units as needed, do not increase further.

## 2019-10-12 NOTE — PROVIDER NOTIFICATION
Having contractions every 4 minutes, fairly comfortable. No leaking of fluid noted. 14 jacqueline units of Pitocin infusing. Dr Villeda updated per phone. Will come and evaluate patient.

## 2019-10-12 NOTE — PROVIDER NOTIFICATION
, 39 weeks gestation, here for an elcective induction. Monitors explained and applied. History and prenatal reviewed. 0845- Dr. Villeda at bedside, AROM, no fluid noted. Cervix /-1. Pitocin begun per protocol. Patient OK with POC.. Patient's partner at bedside, birth plan reviewed with patient and partner.

## 2019-10-12 NOTE — PROGRESS NOTES
Attempt to AROM cervix= 3+cm/75%/-2 minimal but clear fluid.    A : IUP at 39 weeks         On 14units of pitocin notes mild cramping  P: anticipate

## 2019-10-13 LAB
GLUCOSE BLDC GLUCOMTR-MCNC: 114 MG/DL (ref 70–99)
GLUCOSE BLDC GLUCOMTR-MCNC: 143 MG/DL (ref 70–99)
GLUCOSE BLDC GLUCOMTR-MCNC: 88 MG/DL (ref 70–99)
GLUCOSE BLDC GLUCOMTR-MCNC: 89 MG/DL (ref 70–99)
GLUCOSE BLDC GLUCOMTR-MCNC: 93 MG/DL (ref 70–99)
GLUCOSE BLDC GLUCOMTR-MCNC: 97 MG/DL (ref 70–99)
T PALLIDUM AB SER QL: NONREACTIVE

## 2019-10-13 PROCEDURE — 25000125 ZZHC RX 250: Performed by: OBSTETRICS & GYNECOLOGY

## 2019-10-13 PROCEDURE — 72200001 ZZH LABOR CARE VAGINAL DELIVERY SINGLE

## 2019-10-13 PROCEDURE — 25000132 ZZH RX MED GY IP 250 OP 250 PS 637: Performed by: INTERNAL MEDICINE

## 2019-10-13 PROCEDURE — 3E0R3BZ INTRODUCTION OF ANESTHETIC AGENT INTO SPINAL CANAL, PERCUTANEOUS APPROACH: ICD-10-PCS | Performed by: ANESTHESIOLOGY

## 2019-10-13 PROCEDURE — 99222 1ST HOSP IP/OBS MODERATE 55: CPT | Performed by: INTERNAL MEDICINE

## 2019-10-13 PROCEDURE — 25000131 ZZH RX MED GY IP 250 OP 636 PS 637: Performed by: INTERNAL MEDICINE

## 2019-10-13 PROCEDURE — 25000125 ZZHC RX 250: Performed by: ANESTHESIOLOGY

## 2019-10-13 PROCEDURE — 99207 ZZC CONSULT E&M CHANGED TO SUBSEQUENT LEVEL: CPT | Performed by: INTERNAL MEDICINE

## 2019-10-13 PROCEDURE — 25000132 ZZH RX MED GY IP 250 OP 250 PS 637: Performed by: OBSTETRICS & GYNECOLOGY

## 2019-10-13 PROCEDURE — 10907ZC DRAINAGE OF AMNIOTIC FLUID, THERAPEUTIC FROM PRODUCTS OF CONCEPTION, VIA NATURAL OR ARTIFICIAL OPENING: ICD-10-PCS | Performed by: OBSTETRICS & GYNECOLOGY

## 2019-10-13 PROCEDURE — 25000125 ZZHC RX 250

## 2019-10-13 PROCEDURE — 12000000 ZZH R&B MED SURG/OB

## 2019-10-13 PROCEDURE — 00000146 ZZHCL STATISTIC GLUCOSE BY METER IP

## 2019-10-13 PROCEDURE — 25000128 H RX IP 250 OP 636: Performed by: ANESTHESIOLOGY

## 2019-10-13 RX ORDER — EPHEDRINE SULFATE 50 MG/ML
INJECTION, SOLUTION INTRAMUSCULAR; INTRAVENOUS; SUBCUTANEOUS
Status: DISCONTINUED
Start: 2019-10-13 | End: 2019-10-13 | Stop reason: HOSPADM

## 2019-10-13 RX ORDER — OXYTOCIN/0.9 % SODIUM CHLORIDE 30/500 ML
100 PLASTIC BAG, INJECTION (ML) INTRAVENOUS CONTINUOUS
Status: DISCONTINUED | OUTPATIENT
Start: 2019-10-13 | End: 2019-10-15 | Stop reason: HOSPADM

## 2019-10-13 RX ORDER — EPHEDRINE SULFATE 50 MG/ML
5 INJECTION, SOLUTION INTRAMUSCULAR; INTRAVENOUS; SUBCUTANEOUS ONCE
Status: DISCONTINUED | OUTPATIENT
Start: 2019-10-13 | End: 2019-10-13

## 2019-10-13 RX ORDER — EPHEDRINE SULFATE 50 MG/ML
INJECTION, SOLUTION INTRAMUSCULAR; INTRAVENOUS; SUBCUTANEOUS
Status: COMPLETED
Start: 2019-10-13 | End: 2019-10-13

## 2019-10-13 RX ORDER — IBUPROFEN 800 MG/1
800 TABLET, FILM COATED ORAL EVERY 6 HOURS PRN
Status: DISCONTINUED | OUTPATIENT
Start: 2019-10-13 | End: 2019-10-15 | Stop reason: HOSPADM

## 2019-10-13 RX ORDER — DEXTROSE MONOHYDRATE 25 G/50ML
25-50 INJECTION, SOLUTION INTRAVENOUS
Status: DISCONTINUED | OUTPATIENT
Start: 2019-10-13 | End: 2019-10-13

## 2019-10-13 RX ORDER — NICOTINE POLACRILEX 4 MG
15-30 LOZENGE BUCCAL
Status: DISCONTINUED | OUTPATIENT
Start: 2019-10-13 | End: 2019-10-15 | Stop reason: HOSPADM

## 2019-10-13 RX ORDER — LANOLIN 100 %
OINTMENT (GRAM) TOPICAL
Status: DISCONTINUED | OUTPATIENT
Start: 2019-10-13 | End: 2019-10-15 | Stop reason: HOSPADM

## 2019-10-13 RX ORDER — AMOXICILLIN 250 MG
1 CAPSULE ORAL 2 TIMES DAILY
Status: DISCONTINUED | OUTPATIENT
Start: 2019-10-13 | End: 2019-10-15 | Stop reason: HOSPADM

## 2019-10-13 RX ORDER — AMOXICILLIN 250 MG
2 CAPSULE ORAL 2 TIMES DAILY
Status: DISCONTINUED | OUTPATIENT
Start: 2019-10-13 | End: 2019-10-15 | Stop reason: HOSPADM

## 2019-10-13 RX ORDER — DEXTROSE MONOHYDRATE 25 G/50ML
25-50 INJECTION, SOLUTION INTRAVENOUS
Status: DISCONTINUED | OUTPATIENT
Start: 2019-10-13 | End: 2019-10-15 | Stop reason: HOSPADM

## 2019-10-13 RX ORDER — CALCIUM CARBONATE 500 MG/1
1000 TABLET, CHEWABLE ORAL EVERY 4 HOURS PRN
Status: DISCONTINUED | OUTPATIENT
Start: 2019-10-13 | End: 2019-10-15 | Stop reason: HOSPADM

## 2019-10-13 RX ORDER — OXYTOCIN/0.9 % SODIUM CHLORIDE 30/500 ML
340 PLASTIC BAG, INJECTION (ML) INTRAVENOUS CONTINUOUS PRN
Status: DISCONTINUED | OUTPATIENT
Start: 2019-10-13 | End: 2019-10-15 | Stop reason: HOSPADM

## 2019-10-13 RX ORDER — HYDROCORTISONE 2.5 %
CREAM (GRAM) TOPICAL 3 TIMES DAILY PRN
Status: DISCONTINUED | OUTPATIENT
Start: 2019-10-13 | End: 2019-10-15 | Stop reason: HOSPADM

## 2019-10-13 RX ORDER — MISOPROSTOL 200 UG/1
400 TABLET ORAL
Status: DISCONTINUED | OUTPATIENT
Start: 2019-10-13 | End: 2019-10-15 | Stop reason: HOSPADM

## 2019-10-13 RX ORDER — ACETAMINOPHEN 325 MG/1
650 TABLET ORAL EVERY 4 HOURS PRN
Status: DISCONTINUED | OUTPATIENT
Start: 2019-10-13 | End: 2019-10-15 | Stop reason: HOSPADM

## 2019-10-13 RX ORDER — BISACODYL 10 MG
10 SUPPOSITORY, RECTAL RECTAL DAILY PRN
Status: DISCONTINUED | OUTPATIENT
Start: 2019-10-15 | End: 2019-10-15 | Stop reason: HOSPADM

## 2019-10-13 RX ORDER — NICOTINE POLACRILEX 4 MG
15-30 LOZENGE BUCCAL
Status: DISCONTINUED | OUTPATIENT
Start: 2019-10-13 | End: 2019-10-13

## 2019-10-13 RX ORDER — OXYTOCIN 10 [USP'U]/ML
10 INJECTION, SOLUTION INTRAMUSCULAR; INTRAVENOUS
Status: DISCONTINUED | OUTPATIENT
Start: 2019-10-13 | End: 2019-10-15 | Stop reason: HOSPADM

## 2019-10-13 RX ORDER — EPHEDRINE SULFATE 50 MG/ML
5 INJECTION, SOLUTION INTRAMUSCULAR; INTRAVENOUS; SUBCUTANEOUS ONCE
Status: COMPLETED | OUTPATIENT
Start: 2019-10-13 | End: 2019-10-13

## 2019-10-13 RX ORDER — NALOXONE HYDROCHLORIDE 0.4 MG/ML
.1-.4 INJECTION, SOLUTION INTRAMUSCULAR; INTRAVENOUS; SUBCUTANEOUS
Status: DISCONTINUED | OUTPATIENT
Start: 2019-10-13 | End: 2019-10-15 | Stop reason: HOSPADM

## 2019-10-13 RX ORDER — PANTOPRAZOLE SODIUM 40 MG/1
40 TABLET, DELAYED RELEASE ORAL
Status: DISCONTINUED | OUTPATIENT
Start: 2019-10-13 | End: 2019-10-15 | Stop reason: HOSPADM

## 2019-10-13 RX ORDER — PRENATAL VIT/IRON FUM/FOLIC AC 27MG-0.8MG
1 TABLET ORAL DAILY
Status: DISCONTINUED | OUTPATIENT
Start: 2019-10-13 | End: 2019-10-15 | Stop reason: HOSPADM

## 2019-10-13 RX ADMIN — IBUPROFEN 800 MG: 800 TABLET ORAL at 11:15

## 2019-10-13 RX ADMIN — BUPIVACAINE HYDROCHLORIDE 5 ML: 2.5 INJECTION, SOLUTION EPIDURAL; INFILTRATION; INTRACAUDAL at 00:09

## 2019-10-13 RX ADMIN — Medication 100 MCG: at 00:39

## 2019-10-13 RX ADMIN — Medication 5 MG: at 00:36

## 2019-10-13 RX ADMIN — ACETAMINOPHEN 650 MG: 325 TABLET, FILM COATED ORAL at 09:07

## 2019-10-13 RX ADMIN — INSULIN ASPART 1 UNITS: 100 INJECTION, SOLUTION INTRAVENOUS; SUBCUTANEOUS at 08:27

## 2019-10-13 RX ADMIN — PANTOPRAZOLE SODIUM 40 MG: 40 TABLET, DELAYED RELEASE ORAL at 12:14

## 2019-10-13 RX ADMIN — Medication 100 MCG: at 00:59

## 2019-10-13 RX ADMIN — Medication 340 ML/HR: at 03:56

## 2019-10-13 RX ADMIN — ACETAMINOPHEN 650 MG: 325 TABLET, FILM COATED ORAL at 04:58

## 2019-10-13 RX ADMIN — ESCITALOPRAM OXALATE 30 MG: 20 TABLET ORAL at 21:33

## 2019-10-13 RX ADMIN — PRENATAL VIT W/ FE FUMARATE-FA TAB 27-0.8 MG 1 TABLET: 27-0.8 TAB at 08:24

## 2019-10-13 RX ADMIN — IBUPROFEN 800 MG: 800 TABLET ORAL at 23:29

## 2019-10-13 RX ADMIN — CALCIUM CARBONATE (ANTACID) CHEW TAB 500 MG 1000 MG: 500 CHEW TAB at 08:24

## 2019-10-13 RX ADMIN — IBUPROFEN 800 MG: 800 TABLET ORAL at 17:12

## 2019-10-13 RX ADMIN — SENNOSIDES AND DOCUSATE SODIUM 1 TABLET: 8.6; 5 TABLET ORAL at 08:24

## 2019-10-13 RX ADMIN — ESCITALOPRAM 5 MG: 5 TABLET, FILM COATED ORAL at 05:34

## 2019-10-13 RX ADMIN — SENNOSIDES AND DOCUSATE SODIUM 1 TABLET: 8.6; 5 TABLET ORAL at 21:34

## 2019-10-13 RX ADMIN — ACETAMINOPHEN 650 MG: 325 TABLET, FILM COATED ORAL at 17:13

## 2019-10-13 RX ADMIN — EPHEDRINE SULFATE 5 MG: 50 INJECTION, SOLUTION INTRAVENOUS at 00:18

## 2019-10-13 RX ADMIN — ACETAMINOPHEN 650 MG: 325 TABLET, FILM COATED ORAL at 21:34

## 2019-10-13 RX ADMIN — IBUPROFEN 800 MG: 800 TABLET ORAL at 04:58

## 2019-10-13 RX ADMIN — PANTOPRAZOLE SODIUM 40 MG: 40 TABLET, DELAYED RELEASE ORAL at 21:33

## 2019-10-13 ASSESSMENT — ACTIVITIES OF DAILY LIVING (ADL)
COGNITION: 0 - NO COGNITION ISSUES REPORTED
AMBULATION: 0-->INDEPENDENT
RETIRED_EATING: 0-->INDEPENDENT
BATHING: 0-->INDEPENDENT
DRESS: 0-->INDEPENDENT
FALL_HISTORY_WITHIN_LAST_SIX_MONTHS: NO
SWALLOWING: 0-->SWALLOWS FOODS/LIQUIDS WITHOUT DIFFICULTY
TOILETING: 0-->INDEPENDENT
TRANSFERRING: 0-->INDEPENDENT
RETIRED_COMMUNICATION: 0-->UNDERSTANDS/COMMUNICATES WITHOUT DIFFICULTY

## 2019-10-13 NOTE — ANESTHESIA PROCEDURE NOTES
Peripheral nerve/Neuraxial procedure note : epidural catheter  Pre-Procedure  Performed by Danny Pate MD  Referred by EDGARD  Location: OB    Procedure Times:10/12/2019 11:54 PM and 10/13/2019 12:11 AM  Pre-Anesthestic Checklist: patient identified, IV checked, risks and benefits discussed, informed consent, monitors and equipment checked, pre-op evaluation and at physician/surgeon's request    Timeout  Correct Patient: Yes   Correct Procedure: Yes   Correct Site: Yes   Correct Laterality: N/A   Correct Position: Yes   Site Marked: N/A   .   Procedure Documentation    .    Procedure: epidural catheter, .   Patient Position:sitting Insertion Site:L3-4  (midline approach) Injection technique: LORT saline   Local skin infiltrated with mL of 1% lidocaine.  COLLETTE at 7 cm    Patient Prep/Sterile Barriers; mask, sterile gloves, povidone-iodine 7.5% surgical scrub, patient draped.  .  Needle: [employer]y needle   Needle Gauge: 17.    Needle Length (Inches) 3.5   # of attempts: 1 and  # of redirects:  1 .    Catheter: 19 G . .  (challenging to pass catheter)  7 cm epidural space.  14 cm at skin.   .    Assessment/Narrative  Paresthesias: No.  .  .  Aspiration negative for heme or CSF  . Test dose of 3 mL lidocaine 1.5% w/ 1:200,000 epinephrine at 00:05.  Test dose negative for signs of intravascular, subdural or intrathecal injection (Test dose equivocal, mild feet tingling but no other odd sensation after 4 minutes). Comments:  5 ml 0.25% bupivacaine given after equivocal test dose. Patient rapidly got very numb, to t6 level. Catheter was pulled, patient advised that she'll be numb for a few hours until the local anesthetic wears off; if she has not delivered by the time the anesthetic wears off I will replace her epidural.    Danny Pate MD

## 2019-10-13 NOTE — PLAN OF CARE
Pt. VSS. Pain managed with PRN ibuprofen and acetaminophen. Independent in infant and personal cares. Breastfeeding infant. Attentive to infant needs and bonding well with infant. Wife at bedside, supportive.  and 93 before meals.

## 2019-10-13 NOTE — PROGRESS NOTES
Called to see this patient for evaluation of HA following known dural puncture while undergoing epidural catheter placement. BLANCA seems to be getting worse despite Motrin and Tylenol. She also note some photophobia but has not been supine since the onset of the HA to note if there is a positional component to her symptoms.    We discussed treatment options. I recommended a blood patch since she has a known puncture and the likelihood of spontaneous resolution of her symptoms in the near term is unlikely.     Plan: She will manage her symptoms tonight with PO analgesics and maintaining a recumbent position. Blood patch will be scheduled for tomorrow if symptoms persist. Heladio

## 2019-10-13 NOTE — PROVIDER NOTIFICATION
10/13/19 0554   Provider Notification   Provider Name/Title [facility] Hospitalist   Method of Notification Phone   MD called unit regarding patient, and consult that MD received regarding patient's diabetes, and orders. Received orders check patient's BS before meals and at bedtime over the next 24 hours, if stable (below 140) may discontinue. If needing any sliding scale, will need to continue Blood sugar checks. Will relay back to patient.

## 2019-10-13 NOTE — PROVIDER NOTIFICATION
"   10/12/19 1948   Provider Notification   Provider Name/Title Dr. Villeda   Method of Notification Phone   Request Evaluate - Remote   Notification Reason Uterine Activity;Status Update   Phone call placed to Dr. Villeda to give an update of uterine activity and labor status. Patient is not having any discomfort at this time. Rating her pain maybe a 1 with contractions. Feels like her contractions have \" fizzled' out. Asked MD if we could cut Pitocin in half, then, start increasing Pitocin after that.  Received orders to place an IUPC, decrease IV Pitocin in half, then start increasing Pitocin .   Will talk with patient with plan of care.   "

## 2019-10-13 NOTE — PROVIDER NOTIFICATION
10/13/19 0313   Provider Notification   Provider Name/Title Dr. Rio PAGAN   Method of Notification Phone   Request Evaluate - Remote   Notification Reason Pain   MDA called because patient is starting to feel some contraction pain again. Due to the nature of the reaction of the Epidural and her BP's. Dr. Pate would like to have patient start with IV Fentanyl for pain control until patient has feeling back in her legs, then MDA would replace her epidural.

## 2019-10-13 NOTE — PLAN OF CARE
Data: Brent Walters transferred to [facility] via wheelchair at 0655. Baby transferred via parent's arms.  Action: Receiving unit notified of transfer: Yes. Patient and family notified of room change. Report will be given to dayshift RN at shift change. Belongings sent to receiving unit. Accompanied by Registered Nurse. Oriented patient to surroundings. Call light within reach. ID bands double-checked with another nurse  Response: Patient tolerated transfer and is stable.

## 2019-10-13 NOTE — PROGRESS NOTES
"Asked to examine cervix due to a  \"layer of adhesion\" overlying the fetal head. Examination reveals a tough membraneous tissue overlying the fetal head. This was carefully opened manually.      Patient states that she has had a LEEP and several suction curettage procedures.     A: Reduction of cervical scar tissue  P: Intran placed      Continue pitocin as needed  "

## 2019-10-13 NOTE — PROVIDER NOTIFICATION
10/12/19 2050   Vaginal Exam   Method sterile exam per physician   Vaginal Bleeding red, bright   Cervical Position anterior   Cervical Consistency soft   Cervical Dilation (cm) 4   Cervical Effacement 80%   Fetal Station -2   Per Dr. Villeda

## 2019-10-13 NOTE — PROGRESS NOTES
Doing well. Resting in bed. Partner holding baby.    vss afebrile  Abdomen soft and nt  Fundus firm and nt  Extremities nl with +1 edema    BS this am 143    A: PPD 0 s/p  induction for gestational diabetes requiring insulin at bedtime,  Mildly elevated BS.    P: monitor BS      Routine pp care

## 2019-10-13 NOTE — ANESTHESIA PREPROCEDURE EVALUATION
Anesthesia Pre-Procedure Evaluation    Patient: Brent Walters   MRN: [id number] : 1985          Preoperative Diagnosis: * No surgery found *        Past Medical History:   Diagnosis Date     Cervical high risk HPV (human papillomavirus) test positive 2017, 18: NIL pap, + HR HPV (not 16 or 18) result.      Depressive disorder     on meds     Diabetes (H)     gestational diabetic     LSIL (low grade squamous intraepithelial lesion) on Pap smear 2011    colp - QUIQUE I     Past Surgical History:   Procedure Laterality Date     ABDOMEN SURGERY      Tummy tuck     BREAST SURGERY      breast reduction      D & C       SURGICAL HISTORY OF -       tummy [facility]     Anesthesia Evaluation       history and physical reviewed . Pt has had prior anesthetic. Type: Regional    No history of anesthetic complications          ROS/MED HX    ENT/Pulmonary:  - neg pulmonary ROS     Neurologic:  - neg neurologic ROS     Cardiovascular:  - neg cardiovascular ROS       METS/Exercise Tolerance:     Hematologic:         Musculoskeletal:         GI/Hepatic:  - neg GI/hepatic ROS       Renal/Genitourinary:         Endo:         Psychiatric:         Infectious Disease:         Malignancy:         Other:                     neg OB ROS            Physical Exam  Normal systems: cardiovascular, pulmonary and dental    Airway   Mallampati: II  TM distance: > 3 FB  Neck ROM: full  Mouth opening: > 3 cm    Dental     Cardiovascular       Pulmonary             Lab Results   Component Value Date    WBC 11.8 (H) 2019    HGB 12.3 2019    HCT 38.0 2019     2019     2019    POTASSIUM 3.6 2019    CHLORIDE 107 2019    CO2 24 2019    BUN 8 2019    CR 0.57 2019     (H) 2019    EVIN 9.5 2019    ALT 23 10/11/2010    AST 22 10/11/2010    TSH 2.38 2018    HCG Negative 2014       Preop Vitals  BP Readings from Last 3 Encounters:  "  10/12/19 121/74   07/08/19 111/75   04/09/19 110/77    Pulse Readings from Last 3 Encounters:   10/12/19 100   07/08/19 108   04/09/19 84      Resp Readings from Last 3 Encounters:   10/12/19 18   07/08/19 12   04/09/19 16    SpO2 Readings from Last 3 Encounters:   07/08/19 100%   04/09/19 98%   03/30/19 96%      Temp Readings from Last 1 Encounters:   10/12/19 98  F (36.7  C) (Oral)    Ht Readings from Last 1 Encounters:   10/12/19 1.715 m (5' 7.5\")      Wt Readings from Last 1 Encounters:   10/12/19 108 kg (238 lb)    Estimated body mass index is 36.73 kg/m  as calculated from the following:    Height as of this encounter: 1.715 m (5' 7.5\").    Weight as of this encounter: 108 kg (238 lb).       Anesthesia Plan      History & Physical Review      ASA Status:  2 .  OB Epidural Asa: 2            Postoperative Care      Consents  Anesthetic plan, risks, benefits and alternatives discussed with:  Patient..                 Danny Pate MD                    .  "

## 2019-10-13 NOTE — PROVIDER NOTIFICATION
10/12/19 2050   Provider Notification   Provider Name/Title Dr. Villeda   Method of Notification At Bedside   Dr. Villeda at bedside to check SVE. Called MD to bedside regarding scar tissue at cervix site. Patient has had a previous Colposcopy and history of miscarriages with D&C's. Called MD because was unable to advance IUPC. Notified MD that IV Pitocin turned down to 9 mu/hr at 2018.   MD able to manipulate scar tissue, SVE unchanged, however, IUPC placed, flushed, and is working. BS taken at 2030- 98. IV Fentanyl given prior to procedure. Will observe, advance IV Pitocin as able. Received orders to start po Zantac and po Lexapro tonight.

## 2019-10-13 NOTE — L&D DELIVERY NOTE
Delivery Date:  10/13/2019      Brent is a 33-year-old  6, now para 2, SAB 4, with intrauterine pregnancy at 39 weeks who was admitted to Labor and Delivery for induction due to gestational diabetes requiring insulin.  She was noted to be 3-4 cm dilated, 70% effaced.  Attempt to rupture the membranes was performed, but was unsuccessful.  The cervix was noted to be very anterior.  The patient was placed on Pitocin.  She remained at 4 cm.  A second attempt was performed and this time clear fluid was noted.  The patient was continued on Pitocin through out the afternoon.  The contractions were not significant and the patient was already at 18 units of Pitocin.  It was decided to cut the dose in half and placed an IUPC. However, while attempting to place the IUPC, the nurse noted that there were adhesions over the entire fetal head.  Examination revealed that this was a thick membrane most likely scar tissue.  The patient stated that she had had a LEEP procedure and several suction D and C procedures, potentially causing scarring of the cervix. Before the exam to break open the membrane the patient requested and received a dose of IV Fentanyl.  Manually, the membranous tissue was broken down. It was extremely stiff.  An IUPC was then carefully placed.      Shortly after the above patient requested an epidural, but was it ended up being a low Spinal= Intrathecal.   She dilated to completion, the spinal wore off.  The patient requested fentanyl which she was given.  She pushed for a short period of time, approximately 15-20 minutes, delivering the baby over an intact perineum.  The anterior shoulder delivered easily followed by the rest of body.  There was a body cord that was reduced at this time.  The baby was a female infant who was initially not crying and had low tone due to the recent Fentanyl and history of taking Lexapro 30 mg daily during the pregnancy.  The NICU team was called.  The cord was doubly  clamped and ligated.  The patient was given IV Pitocin.  Cord bloods were obtained.  A segment of cord was sent for gases.  The cord bloods were obtained.  Placenta delivered within 5 minutes and was noted to be intact with 3 vessels.  Uterus became firm with manual massage.  Inspection revealed that the patient had sustained no lacerations.   QBL= 165.  The baby responded  with stimulation.  Both the patient and the baby were doing well post-delivery.         MEERA RENE MD             D: 10/13/2019   T: 10/13/2019   MT:       Name:     TYREE KATZ   MRN:      [id number]        Account:        [id number]   :      1985        Delivery Date:  10/13/2019               Document: [id number]

## 2019-10-13 NOTE — PROGRESS NOTES
NUTRITION ASSESSMENT    REASON FOR ASSESSMENT:    Admission Nutrition Risk Screen for  New/Uncontrolled Diabetes     CURRENT DIET AND NOURISHMENT ORDER:  Information obtained from chart review    Diet: Regular    Current Intake/Tolerance: No intake documented    NUTRITION STATUS VALIDATION:  Weight status: Obesity Grade II BMI 35-39.9    MALNUTRITION:  Patient does not meet two of the following criteria necessary for diagnosing malnutrition: significant weight loss, reduced intake, subcutaneous fat loss, muscle loss or fluid retention    NUTRITION DIAGNOSIS:  No nutrition diagnosis at this time.    INTERVENTION:    Nutrition Prescription:     Gestational diabetes intervention not indicated as patient has already delivered.    Anticipate patient will discharge on regular diet    Implementation:      Diet education - per MD order or as appropriate    Follow Up/Monitoring:      No need for further follow up unless another consult is received.      JOSE Keita  3rd floor/ICU: [phone]  All other floors: [phone]  Weekend/holiday: [phone]  Office: [phone]

## 2019-10-13 NOTE — ANESTHESIA POSTPROCEDURE EVALUATION
Patient: Brent Walters    * No procedures listed *    Diagnosis:* No pre-op diagnosis entered *  Diagnosis Additional Information: No value filed.    Anesthesia Type:  No value filed.    Note:  Anesthesia Post Evaluation    Patient location during evaluation: PACU  Patient participation: Able to fully participate in evaluation  Level of consciousness: awake  Pain management: adequate  Airway patency: patent  Cardiovascular status: acceptable  Respiratory status: acceptable  Hydration status: euvolemic  PONV: controlled     Anesthetic complications: None (Suspected dural puncture. Denies positional BLANCA at this time. Heladio)    Comments: S/P epidural for labor. I or my partner remained immediately available, monitored the patient, and supervised nursing staff at key events and necessary intervals.    The patient is doing well. VSS Temp normal. Satisfactory cardiovascular and repiratory function. Neuro at baseline. Denies positional headache. Minimal side effects easily managed w/ PRN meds. No apparent anesthetic complications. No follow-up required.    [email or web address]        Last vitals:  Vitals:    10/13/19 0554 10/13/19 0609 10/13/19 0736   BP: 113/55 112/57 112/63   Pulse:   85   Resp:   16   Temp:   98.6  F (37  C)         Electronically Signed By: Darvin Karimi MD  October 13, 2019  9:09 AM

## 2019-10-13 NOTE — PROVIDER NOTIFICATION
10/13/19 0030   Provider Notification   Provider Name/Title Dr. AKHIL Pate   Method of Notification At Bedside   MDA at bedside to observe low BP's. Received orders for BP medications. And MDA pulled Epidural catheder.

## 2019-10-13 NOTE — CONSULTS
[facility]  Consult Note - Hospitalist Service     Date of Admission:  10/12/2019  Consult Requested by: Dr. Villeda  Reason for Consult: Gestational diabetes mellitus.    Assessment & Plan   Brent Walters is a 33 year old female admitted on 10/12/2019. She has a past medical history significant for GERD, sleep apnea, and gestational diabetes.  She was hospitalized due to pregnancy.  Delivered earlier this morning.    1.  Pregnancy.  Now status post delivery.    2.  Gestational diabetes.  Had not had diabetes prior to pregnancy.  Suspect that she will not require long-term insulin at this point.  Continue NovoLog sliding scale while in hospital to further determine.    3.  Sleep apnea.  Use CPAP while sleeping.    4.  GERD.  Start pantoprazole 40 mg twice a day.        Otto Solis, DO  [facility]    ______________________________________________________________________    Chief Complaint   Heartburn.    History is obtained from the patient    History of Present Illness   Brent Walters is a 33 year old female who has a past medical history significant for GERD, sleep apnea, and gestational diabetes.  She was hospitalized due to pregnancy.  Delivered earlier this morning.  Currently having mild pelvic pain.  Is noticing some heartburn.  Did have mild nausea earlier in the day.  Denies chest pain, shortness of breath, fevers, chills, or diarrhea.  No other acute complaints.    Review of Systems   The 10 point Review of Systems is negative other than noted in the HPI     Past Medical History    I have reviewed this patient's medical history and updated it with pertinent information if needed.   Past Medical History:   Diagnosis Date     Cervical high risk HPV (human papillomavirus) test positive 08/16/2017, 08/14/18 08/16/17: NIL pap, + HR HPV (not 16 or 18) result.      Depressive disorder     on meds     Diabetes (H)     gestational diabetic     LSIL (low grade squamous  intraepithelial lesion) on Pap smear 11/2011    colp - QUIQUE I       Past Surgical History   I have reviewed this patient's surgical history and updated it with pertinent information if needed.  Past Surgical History:   Procedure Laterality Date     ABDOMEN SURGERY      Tummy guadalupe     BREAST SURGERY      breast reduction 2016     D & C       SURGICAL HISTORY OF -       tummy tuck       Social History   I have reviewed this patient's social history and updated it with pertinent information if needed.  Social History     Tobacco Use     Smoking status: Never Smoker     Smokeless tobacco: Never Used     Tobacco comment: none   Substance Use Topics     Alcohol use: Not Currently     Comment: none since pregnancy     Drug use: No     Comment: none       Family History   I have reviewed this patient's family history and updated it with pertinent information if needed.   Family History   Problem Relation Age of Onset     Hypertension Father      Cancer Paternal Grandmother         pancreatic     Cerebrovascular Disease Maternal Grandmother      Asthma No family hx of      C.A.D. No family hx of      Breast Cancer No family hx of      Cancer - colorectal No family hx of      Diabetes No family hx of        Medications   I have reviewed this patient's current medications    Allergies   Allergies   Allergen Reactions     Sulfa Drugs        Physical Exam   Vital Signs: Temp: 98.6  F (37  C) Temp src: Oral BP: 112/63 Pulse: 85   Resp: 16        Weight: 238 lbs 0 oz    Gen:  NAD, A&Ox3.  Eyes:  PERRL, sclera anicteric.  OP:  MMM, no lesions.  Neck:  Supple.  CV:  Regular, no murmurs.  Lung:  CTA b/l, normal effort.  Ab:  +BS, soft.  Skin:  Warm, dry to touch.  No rash.  Ext:  No pitting edema LE b/l.

## 2019-10-13 NOTE — PROGRESS NOTES
Patient is immediate postpartum. H/p gestational diabetes.  -Glucose was controlled even prior to delivery.  -Started sliding scale insulin and continue for 24 hours, if glucose remains controlled, may stop the sliding scale insulin.  -Call hospitalist for any question regarding glucose control.

## 2019-10-14 LAB
GLUCOSE BLDC GLUCOMTR-MCNC: 104 MG/DL (ref 70–99)
GLUCOSE BLDC GLUCOMTR-MCNC: 113 MG/DL (ref 70–99)
GLUCOSE BLDC GLUCOMTR-MCNC: 123 MG/DL (ref 70–99)
GLUCOSE BLDC GLUCOMTR-MCNC: 81 MG/DL (ref 70–99)
GLUCOSE BLDC GLUCOMTR-MCNC: 92 MG/DL (ref 70–99)
GLUCOSE BLDC GLUCOMTR-MCNC: 92 MG/DL (ref 70–99)

## 2019-10-14 PROCEDURE — 12000000 ZZH R&B MED SURG/OB

## 2019-10-14 PROCEDURE — 00000146 ZZHCL STATISTIC GLUCOSE BY METER IP

## 2019-10-14 PROCEDURE — 25000132 ZZH RX MED GY IP 250 OP 250 PS 637: Performed by: OBSTETRICS & GYNECOLOGY

## 2019-10-14 RX ADMIN — SENNOSIDES AND DOCUSATE SODIUM 1 TABLET: 8.6; 5 TABLET ORAL at 09:32

## 2019-10-14 RX ADMIN — ACETAMINOPHEN 650 MG: 325 TABLET, FILM COATED ORAL at 09:32

## 2019-10-14 RX ADMIN — IBUPROFEN 800 MG: 800 TABLET ORAL at 21:02

## 2019-10-14 RX ADMIN — PRENATAL VIT W/ FE FUMARATE-FA TAB 27-0.8 MG 1 TABLET: 27-0.8 TAB at 09:32

## 2019-10-14 RX ADMIN — SENNOSIDES AND DOCUSATE SODIUM 1 TABLET: 8.6; 5 TABLET ORAL at 21:03

## 2019-10-14 RX ADMIN — IBUPROFEN 800 MG: 800 TABLET ORAL at 15:41

## 2019-10-14 RX ADMIN — ESCITALOPRAM 25 MG: 5 TABLET, FILM COATED ORAL at 21:03

## 2019-10-14 RX ADMIN — ACETAMINOPHEN 650 MG: 325 TABLET, FILM COATED ORAL at 21:03

## 2019-10-14 RX ADMIN — ACETAMINOPHEN 650 MG: 325 TABLET, FILM COATED ORAL at 04:52

## 2019-10-14 RX ADMIN — IBUPROFEN 800 MG: 800 TABLET ORAL at 05:11

## 2019-10-14 NOTE — PROGRESS NOTES
Chart check.    - CBG wnl. Doubt she will require insulin at home. Will f/u 10/15. Call for Q's.

## 2019-10-14 NOTE — PROGRESS NOTES
Public Health Nurse (PHN) met with patient, discussed resources within [facility].  Provided family resources of [facility] Public [facility] services resource card, home visiting card, Follow along card, community resource guide and car seat information card given and discussed. Offered referral for home visiting, patient replied no to referral. Patient reports no questions or concerns at this time.

## 2019-10-14 NOTE — PLAN OF CARE
VSS. Taking ibuprofen and tylenol prn. Headache has improved overnight, 3-5/10. MDA to assess need for blood patch in the morning. Continue to monitor blood glucose ACHS. Patient wears her own CPAP at night. No concerns with breastfeeding. Patient says she takes lexapro 25 mg, not 30 mg. Partner in room and very supportive.

## 2019-10-14 NOTE — PROVIDER NOTIFICATION
10/13/19 0015   Provider Notification   Provider Name/Title Dr. Rio PAGAN   Method of Notification At Bedside   Dr. Rio PAGAN at bedside regarding low BP's after epidural placement. Patient's legs, feet, torso and upper abdomen feel very heavy and numb. Dermatomes are at a T9. Patient does not feel short of breath, but feels nauseated. IV fluid bolus started. Notified MDA that another nurse in room is going to get IV Ephedrine. MDA stated in room that he ordered Neosynephrine, but will give a verbal order for IV Ephedrine 5mg .   Will call pharmacy to send up Neosynephrine.   EJ at bedside and removed patient's epidural tubing. Epidural had not been started, or scanned at that point. Patient had been turned side to side for FHT's.

## 2019-10-14 NOTE — PLAN OF CARE
UAL. VSS. States she is voiding without difficulty. Reports mild perineal discomfort. Has denied any headache today. Has taken Tylenol this morning and has declined Ibuprofen when it was available. Meeting expected outcomes.

## 2019-10-14 NOTE — PLAN OF CARE
"VSS. Voiding adequately. Tolerating regular diet well. Pt c/o headache, rated pain 5/10. Reported \"Headache decreases when I am laying flat on my back and getting worse when I sit up\". MDA called. Pt was assessed, no new orders at this time. Pt independent with breastfeeding.   Pt refused blood sugar check and insulin injection at 1700. Stated \"I just ate 30 minutes ago. Let's skip it and do blood sugar check at bed time\".  "

## 2019-10-14 NOTE — ANESTHESIA POSTPROCEDURE EVALUATION
Patient sitting up in bed, using her phone. No headache this morning following dural puncture Friday. We'll hold off on a blood patch, but I told her she can always call us if she changes her mind or her headache comes back.      Note:  Anesthesia Post Evaluation    Last vitals:  Vitals:    10/13/19 1713 10/13/19 1801 10/13/19 2329   BP: 115/68 115/59 113/69   Pulse: 84 72    Resp:  18 16   Temp:  97.8  F (36.6  C) 98  F (36.7  C)         Electronically Signed By: Danny Pate MD  October 14, 2019  7:39 AM

## 2019-10-14 NOTE — CONSULTS
[facility]  MATERNAL CHILD HEALTH   INITIAL PSYCHOSOCIAL ASSESSMENT     DATA:     Reason for Social Work Consult: EPDS 12    Presenting Information: SW met with Brent who is  to Yari, they reside in [place] with Sydnee's 19 yr old daughter Malgorzata. They have not chosen a name yet for their  daughter.      Social Support: Both extended families are nearby and supportive.    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 Brent has 10 more weeks leave and Yari will take 2 weeks now and 2 weeks when Brent returns to work. The couple work opposite shifts and will have family cover overlapping schedules for childcare.     Mental Health History: Brent has history of depression and scored a 12 on EPDS with no thoughts of harm. She takes Lexapro which manages her symptoms. She also has a therapist she sees regularly.    Chemical Health History: none    INTERVENTION:       SW completed chart review and collaborated with the multidisciplinary team.     Psychosocial Assessment     Introduction to Maternal Child Health  role and scope of practice     Reviewed Hospital and Community Resources     Assessed Chemical Health History and Current Symptoms     Assessed Mental Health History and Current Symptoms     Identified stressors, barriers and family concerns     Provided support and active empathetic listening and validation.     Provided psychoeducation on  mood and anxiety disorders, assessed for any current symptoms or history    Provided brochure Depression and Anxiety During and after Pregnancy.     ASSESSMENT:     Coping: Well    Affect: appropriate with good eye contact.    Mood:  appropriate, stable and calm    Motivation/Ability to Access Services: Independent in accessing services.    Assessment of Support System: stable and appropriate    Level of engagement with SW: Engaged and appropriate. Able to seek out SW when needs arise.     Family and  parent/infant interactions: Parents are supportive of each other and are bonding well with baby.    Assessment of parental risk for PMAD: Higher than average risk given history of depression however Brent has good insight into her mental health needs and will contact her doctor as needed.    Strengths: caring family, willingness to accept help    Identified Barriers:  None at this time     PLAN:     SW following and available as needed.

## 2019-10-14 NOTE — PROGRESS NOTES
#1 Postpartum day. Ind 39 weeks. Gest Diabetes w Insulin.        Vaginal delivery.    Pt states doing well. Pain well controlled.  Depression score 12.    to visit w patient.  Nursing.    Afebrile VSS.  Fundus firm.Mod flow.  Voiding w/o problems.  Passing flatus.  Ext: w/o edema or pain.    Normal postpartum course.  Depression score 12.  consult.    Plan routine postpartum care.  Plan discharge on 10/15.

## 2019-10-14 NOTE — LACTATION NOTE
This note was copied from a baby's chart.  No lactation visit today as patient had the do not disturb sign on her door.  Plan for visit tomorrow.  Infant is nursing well per RN report.

## 2019-10-15 VITALS
TEMPERATURE: 98.3 F | DIASTOLIC BLOOD PRESSURE: 74 MMHG | BODY MASS INDEX: 36.07 KG/M2 | HEIGHT: 68 IN | HEART RATE: 76 BPM | SYSTOLIC BLOOD PRESSURE: 128 MMHG | WEIGHT: 238 LBS | RESPIRATION RATE: 18 BRPM

## 2019-10-15 LAB
GLUCOSE BLDC GLUCOMTR-MCNC: 92 MG/DL (ref 70–99)
HGB BLD-MCNC: 9 G/DL (ref 11.7–15.7)

## 2019-10-15 PROCEDURE — 00000146 ZZHCL STATISTIC GLUCOSE BY METER IP

## 2019-10-15 PROCEDURE — 25000132 ZZH RX MED GY IP 250 OP 250 PS 637: Performed by: OBSTETRICS & GYNECOLOGY

## 2019-10-15 PROCEDURE — 36415 COLL VENOUS BLD VENIPUNCTURE: CPT | Performed by: PHYSICIAN ASSISTANT

## 2019-10-15 PROCEDURE — 85018 HEMOGLOBIN: CPT | Performed by: PHYSICIAN ASSISTANT

## 2019-10-15 RX ADMIN — SENNOSIDES AND DOCUSATE SODIUM 2 TABLET: 8.6; 5 TABLET ORAL at 08:24

## 2019-10-15 RX ADMIN — PRENATAL VIT W/ FE FUMARATE-FA TAB 27-0.8 MG 1 TABLET: 27-0.8 TAB at 10:45

## 2019-10-15 RX ADMIN — IBUPROFEN 800 MG: 800 TABLET ORAL at 04:28

## 2019-10-15 RX ADMIN — IBUPROFEN 800 MG: 800 TABLET ORAL at 10:44

## 2019-10-15 RX ADMIN — ACETAMINOPHEN 650 MG: 325 TABLET, FILM COATED ORAL at 08:24

## 2019-10-15 RX ADMIN — ACETAMINOPHEN 650 MG: 325 TABLET, FILM COATED ORAL at 04:29

## 2019-10-15 NOTE — PROGRESS NOTES
"OB Postpartum Note    S:  Patient without complaints.  Minimal lochia. Breastfeeding well. Pain controlled. Mood stable, good support. BS stable.     O:  Vitals were reviewed  Blood pressure 122/79, pulse 79, temperature 98.9  F (37.2  C), temperature source Oral, resp. rate 18, height 1.715 m (5' 7.5\"), weight 108 kg (238 lb), last menstrual period 01/12/2019, unknown if currently breastfeeding.          Abdomen - Fundus firm, at umbilicus, nontender        Extremities - No calf tenderness, no unilateral edema    ABO   Date Value Ref Range Status   10/12/2019 O  Final     RH(D)   Date Value Ref Range Status   10/12/2019 Pos  Final     No components found for: RUBELLA    A:   Postpartum Day #2 , s/p Vaginal delivery after induction for GDMA2 on insulin- doing well    P:  1)  GDMA2--BS stable now and breastfeeding. Check 2-3 fasting BS per week, otherwise plan 2-hour GTT at PP visit.         2)  Discharge home.       Nona Cuellar MD    "

## 2019-10-15 NOTE — PLAN OF CARE
Data: Vital signs within normal limits. Postpartum checks within normal limits - see flow record. Patient eating and drinking normally. Up to bathroom independently and voiding adequate amounts.  Intact perineum with minimal swelling. Patient performing self cares and is able to care for infant.      Action: Patient medicated during the shift for pain. See MAR. Patient reassessed within 1 hour after each medication and patient sleeping. Patient education done about pain management and normal postpartum findings. See flow record.    Response: Positive attachment behaviors observed with infant. Other parent of baby present and supportive.    Plan: Continue discharge planning.

## 2019-10-15 NOTE — LACTATION NOTE
This note was copied from a baby's chart.  LC visit. Her baby has been latching well and on both sides.  Infant has been stable and no need to supplement at this time.  Brent has a history of a breast reduction since her last child.   recommends that she stimulate often and pump post feeds during daytime hours to help establish a healthy milk supply.  She is aware that a breast reduction may cause a low milk supply, so careful monitoring of infant feeds and weight are important.  She has no questions at this time.  She is aware she may call prn.

## 2019-10-15 NOTE — PROGRESS NOTES
Chart check.     - CBG wnl.  Has not used insulin here and does not require insulin at home on discharge. May check CBG at home periodically and f/u with PCP.

## 2019-10-15 NOTE — PLAN OF CARE
Doing well with self and infant cares, breast feeding independently. Ibuprofen and tylenol for pain with stated relief, denies difficulty voiding. One touches WDL, see flow sheets, no insulin needed per sliding scale. Partner present and supportive, involved in infant cares. 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

## 2019-10-15 NOTE — PLAN OF CARE
Data: Vital signs stable, assessments within normal limits.   Mother instructed of signs/symptoms to look for and report per discharge instructions.   Discharge outcomes on care plan met.   No apparent pain.  Action: Review of care plan, teaching, and discharge instructions done with mother. Infant identification with ID bands done, mother verification with signature obtained.  Response: Mother states understanding and comfort with infant cares and feeding. All questions about baby care and self care addressed. Anticipate discharge within an hour.

## 2019-10-16 ENCOUNTER — HOSPITAL ENCOUNTER (INPATIENT)
Facility: CLINIC | Age: 34
LOS: 1 days | Discharge: HOME | DRG: 776 | End: 2019-10-16
Attending: EMERGENCY MEDICINE | Admitting: OBSTETRICS & GYNECOLOGY
Payer: COMMERCIAL

## 2019-10-16 ENCOUNTER — PATIENT OUTREACH (OUTPATIENT)
Dept: CARE COORDINATION | Facility: CLINIC | Age: 34
End: 2019-10-16

## 2019-10-16 VITALS
SYSTOLIC BLOOD PRESSURE: 116 MMHG | DIASTOLIC BLOOD PRESSURE: 63 MMHG | RESPIRATION RATE: 16 BRPM | WEIGHT: 238 LBS | HEART RATE: 60 BPM | BODY MASS INDEX: 37.35 KG/M2 | HEIGHT: 67 IN | TEMPERATURE: 98.4 F | OXYGEN SATURATION: 98 %

## 2019-10-16 DIAGNOSIS — R51.9 NONINTRACTABLE HEADACHE, UNSPECIFIED CHRONICITY PATTERN, UNSPECIFIED HEADACHE TYPE: ICD-10-CM

## 2019-10-16 DIAGNOSIS — Z71.89 OTHER SPECIFIED COUNSELING: ICD-10-CM

## 2019-10-16 DIAGNOSIS — G97.1 SPINAL HEADACHE: Primary | ICD-10-CM

## 2019-10-16 PROCEDURE — 25800030 ZZH RX IP 258 OP 636: Performed by: OBSTETRICS & GYNECOLOGY

## 2019-10-16 PROCEDURE — 96374 THER/PROPH/DIAG INJ IV PUSH: CPT

## 2019-10-16 PROCEDURE — 12000000 ZZH R&B MED SURG/OB

## 2019-10-16 PROCEDURE — 99285 EMERGENCY DEPT VISIT HI MDM: CPT

## 2019-10-16 PROCEDURE — 3E0R3GC INTRODUCTION OF OTHER THERAPEUTIC SUBSTANCE INTO SPINAL CANAL, PERCUTANEOUS APPROACH: ICD-10-PCS | Performed by: ANESTHESIOLOGY

## 2019-10-16 PROCEDURE — 62273 INJECT EPIDURAL PATCH: CPT | Performed by: ANESTHESIOLOGY

## 2019-10-16 PROCEDURE — 25000132 ZZH RX MED GY IP 250 OP 250 PS 637: Performed by: OBSTETRICS & GYNECOLOGY

## 2019-10-16 PROCEDURE — 25000128 H RX IP 250 OP 636: Performed by: EMERGENCY MEDICINE

## 2019-10-16 RX ORDER — SODIUM CHLORIDE 9 MG/ML
1000 INJECTION, SOLUTION INTRAVENOUS CONTINUOUS
Status: DISCONTINUED | OUTPATIENT
Start: 2019-10-16 | End: 2019-10-16 | Stop reason: HOSPADM

## 2019-10-16 RX ORDER — SODIUM CHLORIDE, SODIUM LACTATE, POTASSIUM CHLORIDE, CALCIUM CHLORIDE 600; 310; 30; 20 MG/100ML; MG/100ML; MG/100ML; MG/100ML
INJECTION, SOLUTION INTRAVENOUS CONTINUOUS
Status: DISCONTINUED | OUTPATIENT
Start: 2019-10-16 | End: 2019-10-16 | Stop reason: HOSPADM

## 2019-10-16 RX ORDER — AMOXICILLIN 250 MG
1 CAPSULE ORAL 2 TIMES DAILY
Status: DISCONTINUED | OUTPATIENT
Start: 2019-10-16 | End: 2019-10-16 | Stop reason: HOSPADM

## 2019-10-16 RX ORDER — LANOLIN 100 %
OINTMENT (GRAM) TOPICAL
Status: DISCONTINUED | OUTPATIENT
Start: 2019-10-16 | End: 2019-10-16 | Stop reason: HOSPADM

## 2019-10-16 RX ORDER — NALOXONE HYDROCHLORIDE 0.4 MG/ML
.1-.4 INJECTION, SOLUTION INTRAMUSCULAR; INTRAVENOUS; SUBCUTANEOUS
Status: DISCONTINUED | OUTPATIENT
Start: 2019-10-16 | End: 2019-10-16 | Stop reason: HOSPADM

## 2019-10-16 RX ORDER — IBUPROFEN 800 MG/1
800 TABLET, FILM COATED ORAL EVERY 6 HOURS PRN
Status: DISCONTINUED | OUTPATIENT
Start: 2019-10-16 | End: 2019-10-16 | Stop reason: HOSPADM

## 2019-10-16 RX ORDER — ONDANSETRON 2 MG/ML
4 INJECTION INTRAMUSCULAR; INTRAVENOUS EVERY 30 MIN PRN
Status: DISCONTINUED | OUTPATIENT
Start: 2019-10-16 | End: 2019-10-16 | Stop reason: HOSPADM

## 2019-10-16 RX ORDER — ACETAMINOPHEN 325 MG/1
650 TABLET ORAL EVERY 4 HOURS PRN
Status: DISCONTINUED | OUTPATIENT
Start: 2019-10-16 | End: 2019-10-16 | Stop reason: HOSPADM

## 2019-10-16 RX ORDER — ONDANSETRON 4 MG/1
4 TABLET, ORALLY DISINTEGRATING ORAL EVERY 30 MIN PRN
Status: DISCONTINUED | OUTPATIENT
Start: 2019-10-16 | End: 2019-10-16 | Stop reason: HOSPADM

## 2019-10-16 RX ORDER — BISACODYL 10 MG
10 SUPPOSITORY, RECTAL RECTAL DAILY PRN
Status: DISCONTINUED | OUTPATIENT
Start: 2019-10-18 | End: 2019-10-16 | Stop reason: HOSPADM

## 2019-10-16 RX ORDER — AMOXICILLIN 250 MG
2 CAPSULE ORAL 2 TIMES DAILY
Status: DISCONTINUED | OUTPATIENT
Start: 2019-10-16 | End: 2019-10-16 | Stop reason: HOSPADM

## 2019-10-16 RX ORDER — HYDROCORTISONE 2.5 %
CREAM (GRAM) TOPICAL 3 TIMES DAILY PRN
Status: DISCONTINUED | OUTPATIENT
Start: 2019-10-16 | End: 2019-10-16 | Stop reason: HOSPADM

## 2019-10-16 RX ADMIN — PROCHLORPERAZINE EDISYLATE 5 MG: 5 INJECTION, SOLUTION INTRAMUSCULAR; INTRAVENOUS at 01:40

## 2019-10-16 RX ADMIN — ACETAMINOPHEN 650 MG: 325 TABLET, FILM COATED ORAL at 05:45

## 2019-10-16 RX ADMIN — SODIUM CHLORIDE, POTASSIUM CHLORIDE, SODIUM LACTATE AND CALCIUM CHLORIDE: 600; 310; 30; 20 INJECTION, SOLUTION INTRAVENOUS at 08:19

## 2019-10-16 RX ADMIN — SODIUM CHLORIDE 1000 ML: 9 INJECTION, SOLUTION INTRAVENOUS at 01:40

## 2019-10-16 ASSESSMENT — ENCOUNTER SYMPTOMS
NECK PAIN: 1
VOMITING: 0
HEADACHES: 1
BACK PAIN: 1
NAUSEA: 0
FEVER: 0

## 2019-10-16 ASSESSMENT — MIFFLIN-ST. JEOR: SCORE: 1812.19

## 2019-10-16 NOTE — ED TRIAGE NOTES
Vaginal delivery on 10/13, had epidural, now has spinal headache, frontal pain which goes into her neck and shoulders, ABC intact pain is worse with standing

## 2019-10-16 NOTE — DISCHARGE INSTRUCTIONS
"Blood Patch Information Sheet    An autologous epidural \"blood patch\" procedure is a treatment available for post lumbar puncture headaches.     The headache is caused by a continuous small leak of cerebrospinal fluid from the original puncture site.     In about 90% of the cases, symptoms are relieved by the application of an autologous \"blood patch.\" Autologous refers to your own blood.     The procedure is performed by an anesthesiologist in the post anesthesia care unit. If the anesthesiologist determines that it is needed, an IV may be inserted in your arm. The intravenous fluid will help to increase total body fluids, therefore increasing cerebrospinal fluid production and reducing the headache.     The anesthesiologist will locate the epidural space in your spine. A nurse will withdraw a blood sample from your arm. The anesthesiologist will inject the blood into the epidural space, sealing the leak and preventing further leakage of cerebrospinal fluid. Patients usually experience some relief from symptoms immediately.      Blood Patch Discharge Instructions      You may resume your normal activities and diet following the procedure.      You may experience minimal back stiffness and backache.      Observe the injection site. You may experience minimal bruising and bleeding at the site.        Notify your referring physician and/or department of anesthesia at [facility] ([phone]) if you experience severe headache, severe back pain, neck pain, fever, or leg weakness.    Please follow-up with [facility] in 6 weeks for a postpartum examination.  Call or return sooner if you have: worsening pelvic pain, heavy persistent vaginal bleeding, fevers/chills, severe headache (not relieved with tylenol and ibuprofen), chest pain, shortness of breath, depression, or for any other major concern.  Because of gestational diabetes, you must also schedule a repeat (2 hour) glucose test at the time of " your postpartum visit in order to ensure complete resolution of diabetes.

## 2019-10-16 NOTE — ED PROVIDER NOTES
History     Chief Complaint:  Headache    HPI   Brent Walters is a 33 year old female who presents to the emergency department for evaluation of a headache. Per chart review, the patient had a vaginal delivery with an epidural on 10/13 and was discharged from the hospital on 10/15. She states she had a headache in the hospital, but it subsided before she was discharged. The patient states she developed another headache about 1.5 hours prior to her arrival to the emergency department and starts in the front of her head and radiates to her neck, shoulders, and upper back. Her headache is exacerbated upon standing and moving her head. The patient has been taking ibuprofen for pain management. She denies fevers, nausea, or vomiting. She notes she does not have headaches normally. The patient reports she had an epidural when she gave birth to her daughter 19 years ago, but did not have a headache following the delivery. Of note, the patient is not anticoagulated.     Allergies:  Sulfa Drugs     Medications:    Unisom   Lexapro  Zanaflex  Prenata multivitam w/iron  Novolin  Celexa    Past Medical History:    Sleep apnea  Depressive disorder  Gestational diabetes  Cervical high risk HPV test postive  Anxiety  Miscarriage     Past Surgical History:    Tummy tuck surgery  Breast reduction surgery  D & C  [place] teeth extraction  Cyst removed from the back    Family History:    Father: hypertension    Social History:  The patient was accompanied to the ED by her significant other.  Smoking Status: Never Smoker  Smokeless Tobacco: Never Used  Alcohol Use: Negative  Drug Use: Negative  Marital Status:   [2]     Review of Systems   Constitutional: Negative for fever.   Gastrointestinal: Negative for nausea and vomiting.   Musculoskeletal: Positive for back pain and neck pain.   Neurological: Positive for headaches.   All other systems reviewed and are negative.    Physical Exam     Patient Vitals for the past 24 hrs:    BP Temp Temp src Heart Rate Resp SpO2 Weight   10/16/19 0200 -- -- -- -- -- 99 % --   10/16/19 0115 130/86 97.8  F (36.6  C) Oral 64 18 99 % 108 kg (238 lb)     Physical Exam  General: Patient is alert and interactive when I enter the room, wearing towel over eyes  Head:  The scalp, face, and head appear normal  Eyes:  Conjunctivae are normal, PERRL   ENT:    The nose is normal    Pinnae are normal    External acoustic canals are normal  Neck:  Trachea midline, good ROM   CV:  Pulses are normal  Resp:  No respiratory distress   Abdomen:      Soft, non-tender, non-distended  Musc:  Normal muscular tone    No major joint effusions    No asymmetric leg swelling  Skin:  No rash or lesions noted  Neuro:  Speech is normal and fluent. Face is symmetric.     Moving all extremities well.   Psych: Awake. Alert.  Normal affect.  Appropriate interactions.      Emergency Department Course     Interventions:    0140 NS 1000 mL IV Bolus  0140 Compazine 5 mg IV injection    Emergency Department Course:    Past medical records, nursing notes, and vitals reviewed.  0116: I performed an exam of the patient and obtained history, as documented above.    0141 I spoke with a Doctor of Anethesiology, regarding the patient.    0221 I rechecked and updated the patient.    0231 I spoke with Dr. Villeda, OB/GYN, regarding the patient.    0234 I rechecked and updated the patient.    Findings and plan explained to the Patient who consents to admission. Discussed the patient with Dr. Villeda, who will admit the patient to a Adult Med/Surg bed for further monitoring, evaluation, and treatment.      Impression & Plan        Medical Decision Making:  Brent Walters is a 33-year-old female who is 3 days postpartum who presents with headache patient's headache sounds very positional as it gets worse when she stands up.  She does not has a history of headaches.  When I walked in the room she had a towel over her eyes and appeared quite uncomfortable.  I think  at this point she is a candidate for a blood patch given her positional headache.  I discussed the case with on-call anesthesiologist who stated they would not do this overnight but could do it in the morning.  They took down her information and will call her if she goes home.  She is afebrile and not on blood thinners.  Her blood pressure was not preeclamptic.  Patient was very upset that the anesthesiologist would not be coming in in the middle the night to do this. She requested he come down to explain this her. I told her that this was not reasonable as the anesthesiologist already stated this would not be done in the middle of the night. This can be done in the morning as it is not an emergency. She then told me to speak with Dr. Villeda.  I spoke with Dr. Villeda who then spoke with the patient.  We then decided to admit her for headache control monitoring and then will likely do a blood patch in the morning.  Patient was admitted to OB/GYN in stable condition.  Patient was given Compazine and fluids.    Critical Care time: none    Diagnosis:    ICD-10-CM    1. Nonintractable headache, unspecified chronicity pattern, unspecified headache type R51        Disposition:  Admitted to Dr. Villeda    Discharge Medications:  New Prescriptions    No medications on file     I, Katerine Liz, am serving as a scribe on 10/16/2019 at 1:13 AM to personally document services performed by Leeann Armstrong MD based on my observations and the provider's statements to me.       Katerine Liz  10/16/2019   [facility] EMERGENCY DEPARTMENT       Leeann Armstrong MD  10/16/19 0354       Leeann Armstrong MD  10/16/19 0402

## 2019-10-16 NOTE — PROGRESS NOTES
Procedure note: Epidural Blood Patch    Risks of Blood Patch discussed in detail. Questions answered. Consent signed and witnessed by Sapna FRYE.    Procedure: Pt sitting Betadine scrub x 3. Lido 1% injected at L3-4 x 5cc. !7 Tc ML giselle X 1. No CSF or Heme. L AC prepped Betadine x 3 + CHloroprep x1. 17cc blood obtained 1st attempt. Immediately injected into epidural space w/o resistance. Pt returned to supine position.    Instructions given.    Test of cure by returning to sitting position without return of BLANCA.     Heladio

## 2019-10-16 NOTE — H&P
Brent is a 33 year old [id number] who is being readmitted through the ED for spinal headache.  She had an uncomplicated vaginal delivery on 10/13/19. She was induced for gestational diabetes requiring insulin. She did well post delivery except for development of headache that was noted to be positional. She was going to have a blood patch placed but do to improvement did not.    She returns  due to worsening headache. It is exacerbated upon standing and moving her head.  She denies nausea, vomiting, chest pain, fevers, chills. She desires to proceed with blood patch.     PMH:  sleep apnea, depression, gestational diabetes, anxiety  Medications:  Lexapro, Prenatal Vitamins, Zanaflex  Allergies: sulfa  Surgeries:  abdominoplasty, breast reduction, D&C, wisdom teeth removal, cyst removed from back  Family history:  Father HTN    A: 33 year old  PPD 3       Uncomplicated vaginal delivery induced for gestational diabetes requiring insulin      Recurrence of headache most likely due to epidural  P: Patient strongly desires to proceed with placement of blood patch

## 2019-10-16 NOTE — PROGRESS NOTES
October 16, 2019      Pt is now s/p blood patch and HA has resolved.  She is anxious to be discharged.  VSS.  Pt instructed to follow-up in office for routine PP exam at 6 wks PP.  Call/return sooner for any major concerns.      SAULO SEBASTIAN MD

## 2019-10-16 NOTE — ED NOTES
[facility]  ED Nurse Handoff Report    Bretn Walters is a 33 year old female   ED Chief complaint: Headache  . ED Diagnosis:   Final diagnoses:   None     Allergies:   Allergies   Allergen Reactions     Sulfa Drugs        Code Status: Full Code  Activity level - Baseline/Home:  Independent. Activity Level - Current:   Stand by Assist. Lift room needed: No. Bariatric: No   Needed: No   Isolation: Yes. Infection: Not Applicable.     Vital Signs:   Vitals:    10/16/19 0115 10/16/19 0200   BP: 130/86    Resp: 18    Temp: 97.8  F (36.6  C)    TempSrc: Oral    SpO2: 99% 99%   Weight: 108 kg (238 lb)        Cardiac Rhythm:  ,      Pain level: 0-10 Pain Scale: 5  Patient confused: No. Patient Falls Risk: No.   Elimination Status: Has voided   Patient Report - Initial Complaint:Pt to ER with c/o spinal HA . Focused Assessment: Pt is 3 days postpartum with HA sent in for a blood patch, pt is unable to get patch from anesthesia til morning    Tests Performed: none. Abnormal Results: none.   Treatments provided: IVF and meds  Family Comments:     OBS brochure/video discussed/provided to patient:  Yes  ED Medications:   Medications   0.9% sodium chloride BOLUS (1,000 mLs Intravenous New Bag 10/16/19 0140)     Followed by   sodium chloride 0.9% infusion (has no administration in time range)   prochlorperazine (COMPAZINE) injection 5 mg (5 mg Intravenous Given 10/16/19 0140)     Drips infusing:  No  For the majority of the shift, the patient's behavior Green. Interventions performed were N/A.     Severe Sepsis OR Septic Shock Diagnosis Present: No      ED Nurse Name/Phone Number: Faith Bowser RN,   2:55 AM

## 2019-10-16 NOTE — PROGRESS NOTES
"Clinic Care Coordination Contact    Clinic Care Coordination Contact  OUTREACH    Referral Information:            Chief Complaint   Patient presents with     Clinic Care Coordination - Post Hospital     RN        Universal Utilization: no concerns     Utilization    Last refreshed: 10/17/2019 12:31 PM:  Hospital Admissions 2           Last refreshed: 10/17/2019 12:31 PM:  ED Visits 1           Last refreshed: 10/17/2019 12:31 PM:  No Show Count (past year) 0              Current as of: 10/17/2019 12:31 PM              Clinical Concerns:  Current Medical Concerns:  Patient delivered  10-13-19. Was discharged home and then developed severe headache. Was treated with blood patch and admitted to hospital overnight and returned home yesterday. Headache has completely resolved. [place] has appointment with pediatrician for  check tomorrow. Breast milk has come in and  is feeding \"constantly\". Mother appropriately tired and has no concerns today  Current Behavioral Concerns: none    Education Provided to patient: rest/nap when baby does      Health Maintenance Reviewed:    Clinical Pathway: None    Medication Management:  NA     Functional Status:   independent    Living Situation:     Lives with family  Diet/Exercise/Sleep:   lack of sleep due to     Transportation:      self     Psychosocial:   no concerns     Financial/Insurance:      Not discussed     Resources and Interventions:  Current Resources:    ;                         Goals:   Goals        General    Mental Health Management (pt-stated)     Notes - Note edited  8/15/2019 10:05 AM by Seun Ramirez LSW    Goal Statement: Brent would like to establish with a psychiatrist to assist with  mental health medication management.  Measure of Success: Brent will have an appointment scheduled with a perscriber within the next month.   Supportive Steps to Achieve: Mental health referral placed.  Pt is in the [facility] network.  SW to " research providers as needed, navigate insurance and provide list of possible providers to patient if necessary. Lexapro Rx. Sees a therapist.     Barriers: Difficulty managing insurance and statements of busy schedule.  Strengths: Motivated to address Mental Health concerns.   Date to Achieve By: 1 month.  Patient expressed understanding of goal: Pt reports understanding and denies any additional questions or concerns at this times. SW CC engaged in [facility] communication during encounter.    As of today's date 8/15/2019 goal is met at 76 - 100%.   Goal Status:  Complete                        Patient/Caregiver understanding: good           Plan: patient with no current concerns and declines care coordination services at this time.     Shahrzad Awan RN  [facility]-Care Coordination  [facility]  [facility]  [phone]

## 2019-10-16 NOTE — PROGRESS NOTES
Post procedure pain 0/10 headache and back pressure pain 4/10 while HOB > 30.  VSS.  Pt alert & oriented.  Denies numbness or tingling.  Dr. Karimi at bedside to evaluate.  Pt may discharge from phase II & transfer back to room.

## 2019-10-16 NOTE — PLAN OF CARE
Transferred from ED. Room orientation completed with patient and significant other. Admitted for spinal headache, plan to do blood patch in AM. VSS. Up ad renuka. Pain managed with compazine and tylenol. Significant other supportive at bedside.

## 2019-10-16 NOTE — PLAN OF CARE
This morning Brent was resting in bed and getting up to the bathroom. Had rated her headache 4/10 stating it was worse when sitting up. To Preop area for blood patch. Upon return Brent stated her headache was gone and that she had back discomfort which she rated 4/10. At discharge she stated the back discomfort was better at 1-2/10. Discharge instructions reviewed with patient and questions answered. Instructed to make postpartum follow up appointment for 6 weeks after delivery. Discharged at 1045 per wheelchair accompanied by wife and baby.

## 2019-10-22 ENCOUNTER — APPOINTMENT (OUTPATIENT)
Dept: ULTRASOUND IMAGING | Facility: CLINIC | Age: 34
End: 2019-10-22
Attending: EMERGENCY MEDICINE
Payer: COMMERCIAL

## 2019-10-22 ENCOUNTER — HOSPITAL ENCOUNTER (EMERGENCY)
Facility: CLINIC | Age: 34
Discharge: HOME | End: 2019-10-22
Attending: EMERGENCY MEDICINE | Admitting: EMERGENCY MEDICINE
Payer: COMMERCIAL

## 2019-10-22 ENCOUNTER — APPOINTMENT (OUTPATIENT)
Dept: GENERAL RADIOLOGY | Facility: CLINIC | Age: 34
End: 2019-10-22
Attending: EMERGENCY MEDICINE
Payer: COMMERCIAL

## 2019-10-22 VITALS
TEMPERATURE: 98.7 F | DIASTOLIC BLOOD PRESSURE: 89 MMHG | OXYGEN SATURATION: 98 % | SYSTOLIC BLOOD PRESSURE: 139 MMHG | RESPIRATION RATE: 18 BRPM | BODY MASS INDEX: 36.26 KG/M2 | WEIGHT: 231.48 LBS | HEART RATE: 91 BPM

## 2019-10-22 DIAGNOSIS — N71.9 ENDOMETRITIS: ICD-10-CM

## 2019-10-22 LAB
ALBUMIN SERPL-MCNC: 3 G/DL (ref 3.4–5)
ALBUMIN UR-MCNC: NEGATIVE MG/DL
ALP SERPL-CCNC: 109 U/L (ref 40–150)
ALT SERPL W P-5'-P-CCNC: 25 U/L (ref 0–50)
ANION GAP SERPL CALCULATED.3IONS-SCNC: 7 MMOL/L (ref 3–14)
APPEARANCE UR: CLEAR
AST SERPL W P-5'-P-CCNC: 14 U/L (ref 0–45)
BASOPHILS # BLD AUTO: 0 10E9/L (ref 0–0.2)
BASOPHILS NFR BLD AUTO: 0.2 %
BILIRUB SERPL-MCNC: 0.4 MG/DL (ref 0.2–1.3)
BILIRUB UR QL STRIP: NEGATIVE
BUN SERPL-MCNC: 14 MG/DL (ref 7–30)
CALCIUM SERPL-MCNC: 9.1 MG/DL (ref 8.5–10.1)
CHLORIDE SERPL-SCNC: 109 MMOL/L (ref 94–109)
CO2 BLDCOV-SCNC: 22 MMOL/L (ref 21–28)
CO2 SERPL-SCNC: 23 MMOL/L (ref 20–32)
COLOR UR AUTO: ABNORMAL
CREAT SERPL-MCNC: 0.83 MG/DL (ref 0.52–1.04)
DIFFERENTIAL METHOD BLD: ABNORMAL
EOSINOPHIL # BLD AUTO: 0.1 10E9/L (ref 0–0.7)
EOSINOPHIL NFR BLD AUTO: 0.4 %
ERYTHROCYTE [DISTWIDTH] IN BLOOD BY AUTOMATED COUNT: 15.2 % (ref 10–15)
GFR SERPL CREATININE-BSD FRML MDRD: >90 ML/MIN/{1.73_M2}
GLUCOSE SERPL-MCNC: 112 MG/DL (ref 70–99)
GLUCOSE UR STRIP-MCNC: NEGATIVE MG/DL
HCT VFR BLD AUTO: 37.9 % (ref 35–47)
HGB BLD-MCNC: 11.6 G/DL (ref 11.7–15.7)
HGB UR QL STRIP: ABNORMAL
IMM GRANULOCYTES # BLD: 0.1 10E9/L (ref 0–0.4)
IMM GRANULOCYTES NFR BLD: 0.5 %
KETONES UR STRIP-MCNC: NEGATIVE MG/DL
LACTATE BLD-SCNC: 1.5 MMOL/L (ref 0.7–2.1)
LEUKOCYTE ESTERASE UR QL STRIP: ABNORMAL
LYMPHOCYTES # BLD AUTO: 0.8 10E9/L (ref 0.8–5.3)
LYMPHOCYTES NFR BLD AUTO: 4.4 %
MCH RBC QN AUTO: 28.2 PG (ref 26.5–33)
MCHC RBC AUTO-ENTMCNC: 30.6 G/DL (ref 31.5–36.5)
MCV RBC AUTO: 92 FL (ref 78–100)
MONOCYTES # BLD AUTO: 0.5 10E9/L (ref 0–1.3)
MONOCYTES NFR BLD AUTO: 2.8 %
MUCOUS THREADS #/AREA URNS LPF: PRESENT /LPF
NEUTROPHILS # BLD AUTO: 15.5 10E9/L (ref 1.6–8.3)
NEUTROPHILS NFR BLD AUTO: 91.7 %
NITRATE UR QL: NEGATIVE
NRBC # BLD AUTO: 0 10*3/UL
NRBC BLD AUTO-RTO: 0 /100
PCO2 BLDV: 39 MM HG (ref 40–50)
PH BLDV: 7.36 PH (ref 7.32–7.43)
PH UR STRIP: 5.5 PH (ref 5–7)
PLATELET # BLD AUTO: 340 10E9/L (ref 150–450)
PO2 BLDV: 20 MM HG (ref 25–47)
POTASSIUM SERPL-SCNC: 3.7 MMOL/L (ref 3.4–5.3)
PROT SERPL-MCNC: 7.1 G/DL (ref 6.8–8.8)
RBC # BLD AUTO: 4.11 10E12/L (ref 3.8–5.2)
RBC #/AREA URNS AUTO: 7 /HPF (ref 0–2)
SAO2 % BLDV FROM PO2: 30 %
SODIUM SERPL-SCNC: 139 MMOL/L (ref 133–144)
SOURCE: ABNORMAL
SP GR UR STRIP: 1.02 (ref 1–1.03)
SQUAMOUS #/AREA URNS AUTO: 1 /HPF (ref 0–1)
UROBILINOGEN UR STRIP-MCNC: NORMAL MG/DL (ref 0–2)
WBC # BLD AUTO: 16.9 10E9/L (ref 4–11)
WBC #/AREA URNS AUTO: 11 /HPF (ref 0–5)

## 2019-10-22 PROCEDURE — 76856 US EXAM PELVIC COMPLETE: CPT

## 2019-10-22 PROCEDURE — 81001 URINALYSIS AUTO W/SCOPE: CPT | Performed by: EMERGENCY MEDICINE

## 2019-10-22 PROCEDURE — 87040 BLOOD CULTURE FOR BACTERIA: CPT | Performed by: EMERGENCY MEDICINE

## 2019-10-22 PROCEDURE — 36415 COLL VENOUS BLD VENIPUNCTURE: CPT | Performed by: EMERGENCY MEDICINE

## 2019-10-22 PROCEDURE — 96365 THER/PROPH/DIAG IV INF INIT: CPT

## 2019-10-22 PROCEDURE — 71046 X-RAY EXAM CHEST 2 VIEWS: CPT

## 2019-10-22 PROCEDURE — 85025 COMPLETE CBC W/AUTO DIFF WBC: CPT | Performed by: EMERGENCY MEDICINE

## 2019-10-22 PROCEDURE — 99285 EMERGENCY DEPT VISIT HI MDM: CPT | Mod: 25

## 2019-10-22 PROCEDURE — 82803 BLOOD GASES ANY COMBINATION: CPT

## 2019-10-22 PROCEDURE — 83605 ASSAY OF LACTIC ACID: CPT

## 2019-10-22 PROCEDURE — 87086 URINE CULTURE/COLONY COUNT: CPT | Performed by: EMERGENCY MEDICINE

## 2019-10-22 PROCEDURE — 25000128 H RX IP 250 OP 636: Performed by: EMERGENCY MEDICINE

## 2019-10-22 PROCEDURE — 80053 COMPREHEN METABOLIC PANEL: CPT | Performed by: EMERGENCY MEDICINE

## 2019-10-22 PROCEDURE — 96361 HYDRATE IV INFUSION ADD-ON: CPT

## 2019-10-22 PROCEDURE — 25000132 ZZH RX MED GY IP 250 OP 250 PS 637: Performed by: EMERGENCY MEDICINE

## 2019-10-22 RX ORDER — AMOXICILLIN 500 MG/1
500 CAPSULE ORAL EVERY 8 HOURS
Qty: 42 CAPSULE | Refills: 0 | Status: SHIPPED | OUTPATIENT
Start: 2019-10-22 | End: 2019-11-05

## 2019-10-22 RX ORDER — ACETAMINOPHEN 500 MG
1000 TABLET ORAL ONCE
Status: COMPLETED | OUTPATIENT
Start: 2019-10-22 | End: 2019-10-22

## 2019-10-22 RX ORDER — AMPICILLIN AND SULBACTAM 2; 1 G/1; G/1
3 INJECTION, POWDER, FOR SOLUTION INTRAMUSCULAR; INTRAVENOUS ONCE
Status: COMPLETED | OUTPATIENT
Start: 2019-10-22 | End: 2019-10-22

## 2019-10-22 RX ORDER — METRONIDAZOLE 500 MG/1
500 TABLET ORAL EVERY 8 HOURS
Qty: 42 TABLET | Refills: 0 | Status: SHIPPED | OUTPATIENT
Start: 2019-10-22 | End: 2019-11-05

## 2019-10-22 RX ORDER — IBUPROFEN 600 MG/1
600 TABLET, FILM COATED ORAL ONCE
Status: COMPLETED | OUTPATIENT
Start: 2019-10-22 | End: 2019-10-22

## 2019-10-22 RX ADMIN — AMPICILLIN SODIUM AND SULBACTAM SODIUM 3 G: 2; 1 INJECTION, POWDER, FOR SOLUTION INTRAMUSCULAR; INTRAVENOUS at 19:51

## 2019-10-22 RX ADMIN — SODIUM CHLORIDE 1000 ML: 9 INJECTION, SOLUTION INTRAVENOUS at 17:56

## 2019-10-22 RX ADMIN — IBUPROFEN 600 MG: 600 TABLET, FILM COATED ORAL at 18:02

## 2019-10-22 RX ADMIN — ACETAMINOPHEN 1000 MG: 500 TABLET, FILM COATED ORAL at 19:50

## 2019-10-22 ASSESSMENT — ENCOUNTER SYMPTOMS
DYSURIA: 0
SHORTNESS OF BREATH: 0
COUGH: 0
BACK PAIN: 1
FEVER: 1
DIARRHEA: 1
WEAKNESS: 1
ABDOMINAL PAIN: 1
CHILLS: 1
NAUSEA: 1

## 2019-10-22 NOTE — ED PROVIDER NOTES
History     Chief Complaint:  Fever and Abdominal Pain     The history is provided by the patient.      Brent Walters is a 33 year old female, post-partum 8 days (10/14/2019), who presents for a fever and abdominal pain. To note, during patient's delivery, she had a epidural puncture and underwent a blood patch that helped. Then last night, patient had an onset of lower abdominal pain, pelvic pain and lower back pain. She had an onset of chills, weakness and myalgias later on. Patient went to bed but awoke this morning with a fever of 100.4 F. She tried using the bathroom but her pain worsened, she was nauseated and she noticed more rectal pressure while on the toilet. Her stool was also harder. Patient took ibuprofen then and one dose of Tylenol later on at 1300, but she felt no relief. She then presents to the ED or evaluation. Here, patient also has persistent vaginal bleeding and some trouble breathing. No cough, shortness of breath, dysuria or diarrhea. She has been breast feeding normally. Patient's OBGYN is Dr. Ammy Ruth.     Allergies:  Sulfa drugs      Medications:    Vitamin D   Doxylamine succinate  Lexapro  Senna     Past Medical History:    Spinal headache  Gestational diabetes  MDD  ELLIOTT   Hyperemesis gravidarum   SAWYER  LSIL  Anxiety    Past Surgical History:    Abdomen surgery   Blood patch   Breast surgery   D and C    Family History:    HTN - father     Social History:  Negative for tobacco use.  Positive for alcohol use.   Negative for drug use.  Marital Status:  .     Review of Systems   Constitutional: Positive for chills and fever.   Respiratory: Negative for cough and shortness of breath.         Positive for trouble breathing     Gastrointestinal: Positive for abdominal pain (lower), diarrhea and nausea.   Genitourinary: Positive for pelvic pain and vaginal bleeding. Negative for dysuria.   Musculoskeletal: Positive for back pain (lower).   Neurological: Positive for weakness.   All  other systems reviewed and are negative.    Physical Exam     Patient Vitals for the past 24 hrs:   BP Temp Temp src Pulse Heart Rate Resp SpO2 Weight   10/22/19 1805 -- -- -- -- -- -- 98 % --   10/22/19 1802 -- -- -- -- -- -- 97 % --   10/22/19 1800 139/89 -- -- 91 -- -- -- --   10/22/19 1730 (!) 152/87 98.7  F (37.1  C) Oral 104 104 18 100 % 105 kg (231 lb 7.7 oz)     Physical Exam  Eyes:               Sclera white; Pupils are equal and round  ENT:                External ears and nares normal  CV:                  Rate as above with regular rhythm   Resp:               Breath sounds clear and equal bilaterally                          Non-labored, no retractions or accessory muscle use  GI:                   Abdomen is soft, lower midline abdominal tenderness                          No rebound tenderness or peritoneal features  MS:                  Moves all extremities  Skin:                Warm and dry  Neuro:             Speech is normal and fluent. No apparent deficit.    Emergency Department Course   Imaging:  Radiographic findings were communicated with the patient who voiced understanding of the findings.    XR Chest 2 Views   Final Result   IMPRESSION: Negative chest.     As per radiology.      US Pelvic Complete w/o Transvaginal Portable   Final Result   IMPRESSION:   1.  Transvaginal imaging not performed. No endometrial thickening or hypervascularity. There is distention of the endometrial canal with rather simple appearing fluid.     As per radiology.      Laboratory:  CBC: WBC: 16.9 (H), HGB: 11.6 (L), PLT: 340  CMP: Glucose 112 (H), Albumin 3.0 (L), o/w WNL (Creatinine: 0.83)  Blood culture x2: in process.    1754 ISTAT VBG: pH 7.36 / PCO2 39 (L) / PO2 20 (L) / Bicarb 22 / Lactic acid 1.5     UA with micro: blood large, leukocyte esterase moderate, WBC 11 (H), RBC 7 (H) o/w negative  Urine culture: in process.      Interventions:  1756 NS 1L IV  1802 Ibuprofen tablet 600 mg PO  1950 Tylenol tablet  1 G PO  1951 Unasyn 3 g IV    Emergency Department Course:  1740 Nursing notes and vitals reviewed.  I performed an exam of the patient as documented above.     The patient was placed on continuous pulse oximetry and cardiac monitoring while here in the ED.      IV inserted. Medicine administered as documented above. Blood drawn. This was sent to the lab for further testing, results above. The patient provided a urine sample here in the emergency department. This was sent for laboratory testing, findings above.     The patient was sent for a chest XR and pelvic US while in the emergency department, findings above.     1914 I consulted with SITA Allan, regarding the patient's history and presentation here in the emergency department.    1920 I rechecked the patient and discussed the results of her workup thus far.     1926 I spoke with Dr. Regalado again.     Findings and plan explained to the Patient. Patient discharged home with instructions regarding supportive care, medications, and reasons to return. The importance of close follow-up was reviewed. The patient was prescribed Flagyl and amoxicillin.     I personally reviewed the laboratory results with the Patient and answered all related questions prior to discharge.     Impression & Plan    Medical Decision Making:  Brent Walters is a 33 year old female who presents for evaluation of fever and abdominal pain in the post partum. UA is mildly abnormal but not sufficiently so to be causing sepsis. I suspect sepsis is due to endometritis. Ultrasound is not convincing of retained products of conception and case was discussed with OBGYN. Outpatient versus inpatient treatment was discussed as well. Although patient meets sepsis criteria, she does not meet severe sepsis criteria and is non toxic appearing. Given this, it would be best to keep her with her baby and manage on an outpatient basis as long as possible. She will be given IV antibiotics here and started on  oral antibiotics and dual antipyretics at home. Should she worsen at all she should return immediately to [facility] ED for admission. She will be following up in clinic tomorrow. We discussed her elevated blood pressure as this was not present during her pregnancy on chart review. It came down on its own though to 139/89 and will be rechecked again tomorrow. At this time, there are no abnormalities in her platelet counts or LFTs.     Diagnosis:    ICD-10-CM    1. Endometritis N71.9    2. Postpartum hypertension O16.5      Disposition:  discharged to home    Discharge Medications:  New Prescriptions    AMOXICILLIN (AMOXIL) 500 MG CAPSULE    Take 1 capsule (500 mg) by mouth every 8 hours for 14 days    METRONIDAZOLE (FLAGYL) 500 MG TABLET    Take 1 tablet (500 mg) by mouth every 8 hours for 14 days     Scribe Disposition  I, Sheree Whatley, am serving as a scribe on 10/22/2019 at 5:59 PM to personally document services performed by Sherie Salomon MD based on my observations and the provider's statements to me.     Sheree Whatley  10/22/2019   [facility] EMERGENCY DEPARTMENT       Sherie Salomon MD  10/22/19 1303

## 2019-10-22 NOTE — ED TRIAGE NOTES
Pt 8 days post vaginal bleeding.  Pt states this morning started to have increased ab pain, increased vaginal discharge, and fever with chills. Tempo at home 100.0

## 2019-10-22 NOTE — ED AVS SNAPSHOT
[facility] Emergency Department  [address]  [facility] [address]  Phone:  [phone]  Fax:  [phone]                                    Brent Walters   MRN: [id number]    Department:  [facility] Emergency Department   Date of Visit:  10/22/2019           After Visit Summary Signature Page    I have received my discharge instructions, and my questions have been answered. I have discussed any challenges I see with this plan with the nurse or doctor.    ..........................................................................................................................................  Patient/Patient Representative Signature      ..........................................................................................................................................  Patient Representative Print Name and Relationship to Patient    ..................................................               ................................................  Date                                   Time    ..........................................................................................................................................  Reviewed by Signature/Title    ...................................................              ..............................................  Date                                               Time          22EPIC Rev 08/18

## 2019-10-24 PROBLEM — A49.1 GBS (GROUP B STREPTOCOCCUS) INFECTION: Status: ACTIVE | Noted: 2019-10-24

## 2019-10-24 LAB
BACTERIA SPEC CULT: ABNORMAL
Lab: ABNORMAL
SPECIMEN SOURCE: ABNORMAL

## 2019-10-24 NOTE — RESULT ENCOUNTER NOTE
Final urine culture report is NEGATIVE per [facility] ED Lab Result protocol.    If NEGATIVE result, no change in treatment, per [facility] ED Lab Result protocol.

## 2019-10-28 LAB
BACTERIA SPEC CULT: NO GROWTH
BACTERIA SPEC CULT: NO GROWTH
Lab: NORMAL
Lab: NORMAL
SPECIMEN SOURCE: NORMAL
SPECIMEN SOURCE: NORMAL

## 2019-11-08 NOTE — DISCHARGE SUMMARY
Admit Date:     10/12/2019   Discharge Date:     10/15/2019      DATE OF ADMISSION:   10/12/2019      DATE OF DISCHARGE: 10/15/2019      ADMISSION DIAGNOSES:   1. Postpartum day #3 status post uncomplicated vaginal delivery.   2. Persistent headache.      DISCHARGE DIAGNOSES:   1. Postpartum day #3 status post uncomplicated vaginal delivery.   2. Persistent headache.   3. Status post blood patch.      SUMMARY OF INDICATION AND CONSENT:  The patient is a 33-year-old G6, P2-0-4-2 who presented to the ER on postpartum day #3 status post normal vaginal delivery on 10/16 with complaint of persistent headache.  Of note, she had undergone epidural anesthesia during her labor course 3 days prior.  Her headache was mainly frontal in location with radiation to the neck and shoulders, worse with upright positioning.  After evaluation, diagnosis of spinal headache was made and anesthesia was consulted.  Management options were reviewed with the patient by Anesthesia and decision was made to proceed with blood patch.  On the morning of 10/16, she subsequently underwent a blood patch injection.  Later on that morning, the patient's headache had essentially resolved.  Her vital signs were stable and she was feeling well.  She is deemed stable for discharge later that day.      MEDICATIONS ON DISCHARGE:  See MAR.      CONDITION ON DISCHARGE:  Stable.      DISCHARGE INSTRUCTIONS:  The patient was instructed to follow up in the office for her routine postpartum examination at 6 weeks postpartum.  She is instructed to call or to return sooner if she should have recurrent persistent severe headache, persistent worsening abdominal, pelvic pain, heavy vaginal bleeding, fevers, chills, chest pains, palpitations, shortness of breath, or for any other major concern.            SAULO SEBASTIAN MD             D: 11/07/2019   T: 11/07/2019   MT: ELSIE      Name:     TYREE KATZ   MRN:      [id number]        Account:        [id number]    :      1985           Admit Date:     10/12/2019                                  Discharge Date: 10/15/2019      Document: [id number]

## 2019-11-11 ENCOUNTER — TELEPHONE (OUTPATIENT)
Dept: FAMILY MEDICINE | Facility: CLINIC | Age: 34
End: 2019-11-11

## 2019-11-11 NOTE — TELEPHONE ENCOUNTER
Pt is past due for f/u pap smear.  [facility] clinic and schedule.    Debi Amaya  Pap Tracking

## 2019-11-12 NOTE — DISCHARGE SUMMARY
Brent is a 33 year [id number] who was readmitted to [facility] on ppd 3 for spinal headache. She had an uncomplicated vaginal delivery induced for gestational diabetes requiring insulin. She had received an epidural during labor.     A blood patch was placed in the OR by Dr. Darvin Karimi. There were no complications.     The patient did well and was discharged home later that day with resolution of the headache.

## 2019-12-12 ENCOUNTER — MYC MEDICAL ADVICE (OUTPATIENT)
Dept: FAMILY MEDICINE | Facility: CLINIC | Age: 34
End: 2019-12-12

## 2019-12-12 ASSESSMENT — PATIENT HEALTH QUESTIONNAIRE - PHQ9
SUM OF ALL RESPONSES TO PHQ QUESTIONS 1-9: 2
10. IF YOU CHECKED OFF ANY PROBLEMS, HOW DIFFICULT HAVE THESE PROBLEMS MADE IT FOR YOU TO DO YOUR WORK, TAKE CARE OF THINGS AT HOME, OR GET ALONG WITH OTHER PEOPLE: NOT DIFFICULT AT ALL
SUM OF ALL RESPONSES TO PHQ QUESTIONS 1-9: 2

## 2019-12-12 ASSESSMENT — ANXIETY QUESTIONNAIRES
1. FEELING NERVOUS, ANXIOUS, OR ON EDGE: SEVERAL DAYS
4. TROUBLE RELAXING: NOT AT ALL
7. FEELING AFRAID AS IF SOMETHING AWFUL MIGHT HAPPEN: SEVERAL DAYS
GAD7 TOTAL SCORE: 4
2. NOT BEING ABLE TO STOP OR CONTROL WORRYING: SEVERAL DAYS
GAD7 TOTAL SCORE: 4
5. BEING SO RESTLESS THAT IT IS HARD TO SIT STILL: NOT AT ALL
3. WORRYING TOO MUCH ABOUT DIFFERENT THINGS: SEVERAL DAYS
7. FEELING AFRAID AS IF SOMETHING AWFUL MIGHT HAPPEN: SEVERAL DAYS
6. BECOMING EASILY ANNOYED OR IRRITABLE: NOT AT ALL
GAD7 TOTAL SCORE: 4

## 2019-12-12 NOTE — TELEPHONE ENCOUNTER
Panel Management Review      Patient has the following on her problem list: None      Composite cancer screening  Chart review shows that this patient is due/due soon for the following None  Summary:    Patient is due/failing the following:   PAP and PHQ9    Action needed:   Patient needs office visit for PAP. and Patient needs to do PHQ9.    Type of outreach:    Sent [employer] message.    Questions for provider review:    None                                                                                                                                    Nivia Stevens MA       Chart routed to N/A.

## 2019-12-12 NOTE — TELEPHONE ENCOUNTER
Panel Management Review      Patient has the following on her problem list: None      Composite cancer screening  Chart review shows that this patient is due/due soon for the following None  Summary:    Patient is due/failing the following:   PHQ9    Action needed:   None    PHQ-9 SCORE 7/3/2019 7/8/2019 12/12/2019   PHQ-9 Total Score - - -   PHQ-9 Total Score MyChart - 16 (Moderately severe depression) 2 (Minimal depression)   PHQ-9 Total Score 13 16 2     ELLIOTT-7 SCORE 7/8/2019 12/12/2019 12/12/2019   Total Score - - -   Total Score 6 (mild anxiety) 4 (minimal anxiety) 4 (minimal anxiety)   Total Score 6 4 4           Type of outreach:    Patient completed PHQ-9/ELLIOTT-7    Questions for provider review:    None                                                                                                                                    Nivia Stevens MA       Chart routed to N/A.

## 2019-12-13 ASSESSMENT — PATIENT HEALTH QUESTIONNAIRE - PHQ9: SUM OF ALL RESPONSES TO PHQ QUESTIONS 1-9: 2

## 2019-12-13 ASSESSMENT — ANXIETY QUESTIONNAIRES: GAD7 TOTAL SCORE: 4

## 2020-02-09 ENCOUNTER — HEALTH MAINTENANCE LETTER (OUTPATIENT)
Age: 35
End: 2020-02-09

## 2020-02-14 ENCOUNTER — ANCILLARY PROCEDURE (OUTPATIENT)
Dept: GENERAL RADIOLOGY | Facility: CLINIC | Age: 35
End: 2020-02-14
Attending: FAMILY MEDICINE
Payer: COMMERCIAL

## 2020-02-14 ENCOUNTER — OFFICE VISIT (OUTPATIENT)
Dept: URGENT CARE | Facility: URGENT CARE | Age: 35
End: 2020-02-14

## 2020-02-14 VITALS
HEIGHT: 67 IN | WEIGHT: 231 LBS | TEMPERATURE: 97.9 F | DIASTOLIC BLOOD PRESSURE: 76 MMHG | SYSTOLIC BLOOD PRESSURE: 128 MMHG | RESPIRATION RATE: 16 BRPM | HEART RATE: 78 BPM | OXYGEN SATURATION: 98 % | BODY MASS INDEX: 36.26 KG/M2
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
## 2020-02-14 DIAGNOSIS — S13.9XXA CERVICAL SPRAIN, INITIAL ENCOUNTER: ICD-10-CM
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
## 2020-02-14 PROCEDURE — 99214 OFFICE O/P EST MOD 30 MIN: CPT | Performed by: FAMILY MEDICINE

## 2020-02-14 PROCEDURE — 72040 X-RAY EXAM NECK SPINE 2-3 VW: CPT

## 2020-02-14 ASSESSMENT — MIFFLIN-ST. JEOR: SCORE: 1775.44
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
## 2020-03-05 ENCOUNTER — MYC MEDICAL ADVICE (OUTPATIENT)
Dept: FAMILY MEDICINE | Facility: CLINIC | Age: 35
End: 2020-03-05

## 2020-03-05 ENCOUNTER — TELEPHONE (OUTPATIENT)
Dept: FAMILY MEDICINE | Facility: CLINIC | Age: 35
End: 2020-03-05

## 2020-03-05 DIAGNOSIS — F33.1 MAJOR DEPRESSIVE DISORDER, RECURRENT EPISODE, MODERATE (H): ICD-10-CM

## 2020-03-05 DIAGNOSIS — F41.9 ANXIETY: ICD-10-CM

## 2020-03-05 RX ORDER — ESCITALOPRAM OXALATE 20 MG/1
20 TABLET ORAL DAILY
Qty: 90 TABLET | Refills: 0 | Status: SHIPPED | OUTPATIENT
Start: 2020-03-05 | End: 2020-03-06

## 2020-03-05 NOTE — TELEPHONE ENCOUNTER
Reason for Call:  Other call back    Detailed comments: nitesh from pharmacy states 2 different sets of directions on med  escitalopram (LEXAPRO) 20 MG tablet    Phone Number Patient can be reached at: Other phone number:  [phone]    Best Time: asap    Can we leave a detailed message on this number? YES    Call taken on 3/5/2020 at 12:41 PM by Naila Hurley

## 2020-03-06 RX ORDER — ESCITALOPRAM OXALATE 20 MG/1
10 TABLET ORAL DAILY
Qty: 90 TABLET | Refills: 0 | Status: SHIPPED | OUTPATIENT
Start: 2020-03-06 | End: 2020-03-16

## 2020-03-12 DIAGNOSIS — F33.1 MAJOR DEPRESSIVE DISORDER, RECURRENT EPISODE, MODERATE (H): ICD-10-CM

## 2020-03-12 DIAGNOSIS — F41.9 ANXIETY: ICD-10-CM

## 2020-03-12 NOTE — TELEPHONE ENCOUNTER
"Message to Prescriber:    \"Please clarify directions - direction say \"take one table daily\" and \"take one-half table daily\" - which is correct?\"  "

## 2020-03-16 RX ORDER — ESCITALOPRAM OXALATE 20 MG/1
10 TABLET ORAL DAILY
Qty: 45 TABLET | Refills: 1 | Status: SHIPPED | OUTPATIENT
Start: 2020-03-16 | End: 2020-06-15

## 2020-03-16 NOTE — TELEPHONE ENCOUNTER
PHQ-9 score:    PHQ 12/12/2019   PHQ-9 Total Score 2   Q9: Thoughts of better off dead/self-harm past 2 weeks Not at all     Correct dose 1/2 daily, sent again    Sylvie Arteaga, RN, BSN

## 2020-03-17 ENCOUNTER — MYC REFILL (OUTPATIENT)
Dept: FAMILY MEDICINE | Facility: CLINIC | Age: 35
End: 2020-03-17

## 2020-03-17 DIAGNOSIS — F33.1 MAJOR DEPRESSIVE DISORDER, RECURRENT EPISODE, MODERATE (H): ICD-10-CM

## 2020-03-17 DIAGNOSIS — F41.9 ANXIETY: ICD-10-CM

## 2020-03-17 RX ORDER — ESCITALOPRAM OXALATE 20 MG/1
10 TABLET ORAL DAILY
Qty: 45 TABLET | Refills: 1 | Status: CANCELLED | OUTPATIENT
Start: 2020-03-17

## 2020-03-23 NOTE — TELEPHONE ENCOUNTER
"Requested Prescriptions   Pending Prescriptions Disp Refills     escitalopram (LEXAPRO) 20 MG tablet 45 tablet 1     Sig: Take 0.5 tablets (10 mg) by mouth daily Take 1/2 tablet daily       SSRIs Protocol Failed - 3/17/2020 10:13 AM        Failed - No positive pregnancy test in last 12 months        Failed - Recent (6 mo) or future (30 days) visit within the authorizing provider's specialty     Patient had office visit in the last 6 months or has a visit in the next 30 days with authorizing provider or within the authorizing provider's specialty.  See \"Patient Info\" tab in inbasket, or \"Choose Columns\" in Meds & Orders section of the refill encounter.            Passed - PHQ-9 score less than 5 in past 6 months     Please review last PHQ-9 score.           Passed - Medication is active on med list        Passed - Patient is age 18 or older        Passed - No active pregnancy on record           Two refills sent 3/16 - encouraged patient to contact pharmacy  "

## 2020-04-17 ENCOUNTER — MEDICAL CORRESPONDENCE (OUTPATIENT)
Dept: HEALTH INFORMATION MANAGEMENT | Facility: CLINIC | Age: 35
End: 2020-04-17

## 2020-06-11 DIAGNOSIS — F41.9 ANXIETY: ICD-10-CM

## 2020-06-11 DIAGNOSIS — F33.1 MAJOR DEPRESSIVE DISORDER, RECURRENT EPISODE, MODERATE (H): ICD-10-CM

## 2020-06-15 RX ORDER — ESCITALOPRAM OXALATE 20 MG/1
TABLET ORAL
Qty: 30 TABLET | Refills: 0 | Status: SHIPPED | OUTPATIENT
Start: 2020-06-15 | End: 2020-09-11

## 2020-06-15 NOTE — TELEPHONE ENCOUNTER
Routing refill request to provider for review/approval because:  Patient needs to be seen because it has been more than 1 year since last office visit.-2018  PHQ-9 score:    PHQ 6/14/2020   PHQ-9 Total Score 9   Q9: Thoughts of better off dead/self-harm past 2 weeks Not at all

## 2020-09-11 ENCOUNTER — MYC REFILL (OUTPATIENT)
Dept: FAMILY MEDICINE | Facility: CLINIC | Age: 35
End: 2020-09-11

## 2020-09-11 DIAGNOSIS — F33.1 MAJOR DEPRESSIVE DISORDER, RECURRENT EPISODE, MODERATE (H): ICD-10-CM

## 2020-09-11 DIAGNOSIS — F41.9 ANXIETY: ICD-10-CM

## 2020-09-15 RX ORDER — ESCITALOPRAM OXALATE 20 MG/1
20 TABLET ORAL DAILY
Qty: 30 TABLET | Refills: 0 | Status: SHIPPED | OUTPATIENT
Start: 2020-09-15 | End: 2020-10-14

## 2020-10-14 ENCOUNTER — OFFICE VISIT (OUTPATIENT)
Dept: FAMILY MEDICINE | Facility: CLINIC | Age: 35
End: 2020-10-14
Payer: COMMERCIAL

## 2020-10-14 VITALS
TEMPERATURE: 97.8 F | BODY MASS INDEX: 36.07 KG/M2 | SYSTOLIC BLOOD PRESSURE: 128 MMHG | WEIGHT: 238 LBS | HEIGHT: 68 IN | OXYGEN SATURATION: 99 % | DIASTOLIC BLOOD PRESSURE: 72 MMHG | RESPIRATION RATE: 16 BRPM | HEART RATE: 96 BPM

## 2020-10-14 DIAGNOSIS — F33.1 MAJOR DEPRESSIVE DISORDER, RECURRENT EPISODE, MODERATE (H): ICD-10-CM

## 2020-10-14 DIAGNOSIS — Z13.1 SCREENING FOR DIABETES MELLITUS: ICD-10-CM

## 2020-10-14 DIAGNOSIS — Z00.00 ROUTINE GENERAL MEDICAL EXAMINATION AT A HEALTH CARE FACILITY: Primary | ICD-10-CM

## 2020-10-14 DIAGNOSIS — E66.01 MORBID OBESITY (H): ICD-10-CM

## 2020-10-14 DIAGNOSIS — F41.9 ANXIETY: ICD-10-CM

## 2020-10-14 LAB — HBA1C MFR BLD: 5.6 % (ref 0–5.6)

## 2020-10-14 PROCEDURE — 90686 IIV4 VACC NO PRSV 0.5 ML IM: CPT | Performed by: NURSE PRACTITIONER

## 2020-10-14 PROCEDURE — G0145 SCR C/V CYTO,THINLAYER,RESCR: HCPCS | Performed by: NURSE PRACTITIONER

## 2020-10-14 PROCEDURE — 90471 IMMUNIZATION ADMIN: CPT | Performed by: NURSE PRACTITIONER

## 2020-10-14 PROCEDURE — 87624 HPV HI-RISK TYP POOLED RSLT: CPT | Performed by: NURSE PRACTITIONER

## 2020-10-14 PROCEDURE — 83036 HEMOGLOBIN GLYCOSYLATED A1C: CPT | Performed by: NURSE PRACTITIONER

## 2020-10-14 PROCEDURE — 99395 PREV VISIT EST AGE 18-39: CPT | Mod: 25 | Performed by: NURSE PRACTITIONER

## 2020-10-14 PROCEDURE — 36415 COLL VENOUS BLD VENIPUNCTURE: CPT | Performed by: NURSE PRACTITIONER

## 2020-10-14 PROCEDURE — 99214 OFFICE O/P EST MOD 30 MIN: CPT | Mod: 25 | Performed by: NURSE PRACTITIONER

## 2020-10-14 RX ORDER — ESCITALOPRAM OXALATE 10 MG/1
10 TABLET ORAL DAILY
Qty: 90 TABLET | Refills: 3 | Status: SHIPPED | OUTPATIENT
Start: 2020-10-14 | End: 2021-12-14

## 2020-10-14 ASSESSMENT — ENCOUNTER SYMPTOMS
CHILLS: 0
HEMATURIA: 0
CONSTIPATION: 0
HEMATOCHEZIA: 0
EYE PAIN: 0
DIARRHEA: 0
ABDOMINAL PAIN: 0
DIZZINESS: 0
COUGH: 0
NERVOUS/ANXIOUS: 0

## 2020-10-14 ASSESSMENT — MIFFLIN-ST. JEOR: SCORE: 1815.12

## 2020-10-14 NOTE — PROGRESS NOTES
SUBJECTIVE:   CC: Brent Walters is an 34 year old woman who presents for preventive health visit.       Patient has been advised of split billing requirements and indicates understanding: Yes  Healthy Habits:     Getting at least 3 servings of Calcium per day:  Yes    Bi-annual eye exam:  NO    Dental care twice a year:  Yes    Sleep apnea or symptoms of sleep apnea:  Sleep apnea    Diet:  Regular (no restrictions)    Frequency of exercise:  None    Taking medications regularly:  Yes    Medication side effects:  None    PHQ-2 Total Score: 2    Additional concerns today:  Yes      She is doing well on 10 mg of Lexapro.  She takes vitamin D and Chinese herbs, which together keeps her mood stable.  She denies any side effects.        Today's PHQ-2 Score:   PHQ-2 ( 1999 [employer]) 10/14/2020   Q1: Little interest or pleasure in doing things 1   Q2: Feeling down, depressed or hopeless 1   PHQ-2 Score 2   Q1: Little interest or pleasure in doing things Several days   Q2: Feeling down, depressed or hopeless Several days   PHQ-2 Score 2       Abuse: Current or Past (Physical, Sexual or Emotional) - No  Do you feel safe in your environment? Yes        Social History     Tobacco Use     Smoking status: Never Smoker     Smokeless tobacco: Never Used     Tobacco comment: none   Substance Use Topics     Alcohol use: Not Currently     Comment: none since pregnancy         Alcohol Use 10/14/2020   Prescreen: >3 drinks/day or >7 drinks/week? No   Prescreen: >3 drinks/day or >7 drinks/week? -       Reviewed orders with patient.  Reviewed health maintenance and updated orders accordingly - Yes          Pertinent mammograms are reviewed under the imaging tab.  History of abnormal Pap smear: YES - updated in Problem List and Health Maintenance accordingly  PAP / HPV Latest Ref Rng & Units 8/14/2018 8/16/2017 7/19/2016   PAP - NIL NIL NIL   HPV 16 DNA NEG:Negative Negative Negative -   HPV 18 DNA NEG:Negative Negative Negative -   OTHER  "HR HPV NEG:Negative Positive(A) Positive(A) -     Reviewed and updated as needed this visit by clinical staff  Tobacco  Allergies  Meds   Med Hx  Surg Hx  Fam Hx  Soc Hx        Reviewed and updated as needed this visit by Provider                    Review of Systems   Constitutional: Negative for chills.   HENT: Negative for congestion and ear pain.    Eyes: Negative for pain.   Respiratory: Negative for cough.    Cardiovascular: Negative for chest pain.   Gastrointestinal: Negative for abdominal pain, constipation, diarrhea and hematochezia.   Genitourinary: Negative for hematuria.   Neurological: Negative for dizziness.   Psychiatric/Behavioral: The patient is not nervous/anxious.           OBJECTIVE:   /72 (BP Location: Left arm, Patient Position: Chair, Cuff Size: Adult Regular)   Pulse 96   Temp 97.8  F (36.6  C) (Tympanic)   Resp 16   Ht 1.715 m (5' 7.5\")   Wt 108 kg (238 lb)   SpO2 99%   BMI 36.73 kg/m    Physical Exam  GENERAL: healthy, alert and no distress  EYES: Eyes grossly normal to inspection, PERRL and conjunctivae and sclerae normal  HENT: ear canals and TM's normal, nose and mouth without ulcers or lesions  NECK: no adenopathy, no asymmetry, masses, or scars and thyroid normal to palpation  RESP: lungs clear to auscultation - no rales, rhonchi or wheezes  BREAST: normal without masses, tenderness or nipple discharge and no palpable axillary masses or adenopathy  CV: regular rate and rhythm, normal S1 S2, no S3 or S4, no murmur, click or rub, no peripheral edema and peripheral pulses strong  ABDOMEN: soft, nontender, no hepatosplenomegaly, no masses and bowel sounds normal   (female): normal female external genitalia, normal urethral meatus, vaginal mucosa pink, moist, well rugated, and normal cervix/adnexa/uterus without masses or discharge  MS: no gross musculoskeletal defects noted, no edema  SKIN: no suspicious lesions or rashes  NEURO: Normal strength and tone, mentation " "intact and speech normal  PSYCH: mentation appears normal, affect normal/bright        ASSESSMENT/PLAN:   (Z00.00) Routine general medical examination at a health care facility  (primary encounter diagnosis)  Comment:   Plan: INFLUENZA VACCINE IM > 6 MONTHS VALENT IIV4         [17137], Pap imaged thin layer screen with HPV         - recommended age 30 - 65 years (select HPV         order below), HPV High Risk Types DNA Cervical            (F41.9) Anxiety  Comment: well-controlled  Plan: escitalopram (LEXAPRO) 10 MG tablet        The current medical regimen is effective;  continue present plan and medications.     (F33.1) Major depressive disorder, recurrent episode, moderate (H)  Comment: in remission  Plan: escitalopram (LEXAPRO) 10 MG tablet        The current medical regimen is effective;  continue present plan and medications.     (E66.01) BMI >35  Comment:   Plan: Discussed diet and exercise.     (Z13.1) Screening for diabetes mellitus  Comment: history of gestational diabetes  Plan: Hemoglobin A1c              Patient has been advised of split billing requirements and indicates understanding:   COUNSELING:  Reviewed preventive health counseling, as reflected in patient instructions    Estimated body mass index is 36.73 kg/m  as calculated from the following:    Height as of this encounter: 1.715 m (5' 7.5\").    Weight as of this encounter: 108 kg (238 lb).    Weight management plan: Discussed healthy diet and exercise guidelines    She reports that she has never smoked. She has never used smokeless tobacco.      Counseling Resources:  ATP IV Guidelines  Pooled Cohorts Equation Calculator  Breast Cancer Risk Calculator  BRCA-Related Cancer Risk Assessment: FHS-7 Tool  FRAX Risk Assessment  ICSI Preventive Guidelines  Dietary Guidelines for Americans, 2010  USDA's MyPlate  ASA Prophylaxis  Lung CA Screening    Josefina Aquino NP  [facility]  "

## 2020-10-16 DIAGNOSIS — F41.9 ANXIETY: ICD-10-CM

## 2020-10-16 DIAGNOSIS — F33.1 MAJOR DEPRESSIVE DISORDER, RECURRENT EPISODE, MODERATE (H): ICD-10-CM

## 2020-10-16 LAB
COPATH REPORT: NORMAL
PAP: NORMAL

## 2020-10-19 RX ORDER — ESCITALOPRAM OXALATE 20 MG/1
TABLET ORAL
Qty: 30 TABLET | Refills: 0 | OUTPATIENT
Start: 2020-10-19

## 2020-10-19 NOTE — TELEPHONE ENCOUNTER
escitalopram (LEXAPRO)Message sent to pharmacy - Refusal reason: Should already have refills on file (SEE ORDER SENT 10/14/2020 FOR DIFFERENT DOSE TO [facility] [id number].).  Peter FRYE

## 2020-10-20 ENCOUNTER — PATIENT OUTREACH (OUTPATIENT)
Dept: FAMILY MEDICINE | Facility: CLINIC | Age: 35
End: 2020-10-20

## 2020-10-20 NOTE — TELEPHONE ENCOUNTER
02/25/2000: LSIL Pap  10/04/2000: NIL Pap  10/05/2000: [facility] Neg for dysplasia  01/29/01: NIL Pap  10/10/03: NIL Pap  [id number]: NIL Pap  07/20/05: [facility] benign  All above results in care everywhere  11/10/11: LSIL. Plan colp.  12/1/11: [facility] - QUIQUE I. Plan pap in 6 & 12 months.  7/23/12: NIL pap. Plan pap in 6 months.  1/2013 Pap ASCUS +HPV 34 (probable HR)Per Dr. Ramos. Not one of high risk 14 HPVs so will have pap in 3 yrs.  07/19/16: NIL pap. Plan cotest in 1 year per provider.  08/16/17: NIL pap, + HR HPV (not 16 or 18) result. Plan cotest in 1 year per provider.   08/14/18: NIL pap, + HR HPV (not 16 or 18) result. Plan [facility].   08/29/18: [facility] Bx neg for dysplasia. Plan cotest in 1 year.   12/09/19 Patient is lost to pap tracking follow-up  10/14/20: NIL Pap, + HR HPV (not 16 or 18). Plan cotest in 1 year due 10/14/21.

## 2020-11-22 ENCOUNTER — E-VISIT (OUTPATIENT)
Dept: FAMILY MEDICINE | Facility: CLINIC | Age: 35
End: 2020-11-22
Payer: COMMERCIAL

## 2020-11-22 DIAGNOSIS — N61.0 MASTITIS: Primary | ICD-10-CM

## 2020-11-22 DIAGNOSIS — H66.90 ACUTE OTITIS MEDIA, UNSPECIFIED OTITIS MEDIA TYPE: ICD-10-CM

## 2020-11-22 PROCEDURE — 99421 OL DIG E/M SVC 5-10 MIN: CPT | Performed by: NURSE PRACTITIONER

## 2020-12-09 ENCOUNTER — E-VISIT (OUTPATIENT)
Dept: FAMILY MEDICINE | Facility: CLINIC | Age: 35
End: 2020-12-09
Payer: COMMERCIAL

## 2020-12-09 DIAGNOSIS — H66.90 ACUTE OTITIS MEDIA, UNSPECIFIED OTITIS MEDIA TYPE: ICD-10-CM

## 2020-12-09 DIAGNOSIS — N61.0 MASTITIS: ICD-10-CM

## 2020-12-09 PROCEDURE — 99421 OL DIG E/M SVC 5-10 MIN: CPT | Performed by: NURSE PRACTITIONER

## 2021-01-20 ENCOUNTER — TELEPHONE (OUTPATIENT)
Dept: PLASTIC SURGERY | Facility: CLINIC | Age: 36
End: 2021-01-20

## 2021-01-20 NOTE — TELEPHONE ENCOUNTER
Phoned patient to discuss possible consult with Dr. Arechiga for breast reduction w/lipo.  I informed the patient that Dr. Arechiga does not perform liposuction. The patient expressed that liposuction was one of their main objectives. Patient decided that they will look into different surgeons to have surgery.    BART Burroughs NREMT

## 2021-01-20 NOTE — TELEPHONE ENCOUNTER
----- Message from Fidencio Abrams sent at 1/19/2021 11:41 AM CST -----  Regarding: Breast Reduction Consultation Request  Hi all,     This pt called requesting a consultation for breast reduction/lipo with Dr. Arechiga. I don't believe pt is trans. Could someone reach out to pt to discuss options? Let me know if I should be sending this to someone else. Thanks!    Fidencio

## 2021-02-08 ENCOUNTER — MYC MEDICAL ADVICE (OUTPATIENT)
Dept: FAMILY MEDICINE | Facility: CLINIC | Age: 36
End: 2021-02-08

## 2021-03-18 ENCOUNTER — E-VISIT (OUTPATIENT)
Dept: FAMILY MEDICINE | Facility: CLINIC | Age: 36
End: 2021-03-18
Payer: COMMERCIAL

## 2021-03-18 DIAGNOSIS — M54.50 BILATERAL LOW BACK PAIN WITHOUT SCIATICA, UNSPECIFIED CHRONICITY: Primary | ICD-10-CM

## 2021-03-18 PROCEDURE — 99421 OL DIG E/M SVC 5-10 MIN: CPT | Performed by: NURSE PRACTITIONER

## 2021-04-28 ENCOUNTER — OFFICE VISIT (OUTPATIENT)
Dept: FAMILY MEDICINE | Facility: CLINIC | Age: 36
End: 2021-04-28
Payer: COMMERCIAL

## 2021-04-28 VITALS
RESPIRATION RATE: 16 BRPM | DIASTOLIC BLOOD PRESSURE: 74 MMHG | SYSTOLIC BLOOD PRESSURE: 116 MMHG | TEMPERATURE: 98.7 F | BODY MASS INDEX: 37.19 KG/M2 | OXYGEN SATURATION: 98 % | WEIGHT: 241 LBS | HEART RATE: 91 BPM

## 2021-04-28 DIAGNOSIS — Z01.818 PREOP GENERAL PHYSICAL EXAM: Primary | ICD-10-CM

## 2021-04-28 DIAGNOSIS — E66.01 MORBID OBESITY (H): ICD-10-CM

## 2021-04-28 DIAGNOSIS — Z87.898 HISTORY OF BREAST PROBLEM: ICD-10-CM

## 2021-04-28 DIAGNOSIS — F32.5 MAJOR DEPRESSION IN COMPLETE REMISSION (H): ICD-10-CM

## 2021-04-28 LAB
HBA1C MFR BLD: 5.7 % (ref 0–5.6)
HGB BLD-MCNC: 13.3 G/DL (ref 11.7–15.7)

## 2021-04-28 PROCEDURE — 83036 HEMOGLOBIN GLYCOSYLATED A1C: CPT | Performed by: NURSE PRACTITIONER

## 2021-04-28 PROCEDURE — 85018 HEMOGLOBIN: CPT | Performed by: NURSE PRACTITIONER

## 2021-04-28 PROCEDURE — 36415 COLL VENOUS BLD VENIPUNCTURE: CPT | Performed by: NURSE PRACTITIONER

## 2021-04-28 PROCEDURE — 99214 OFFICE O/P EST MOD 30 MIN: CPT | Performed by: NURSE PRACTITIONER

## 2021-04-28 ASSESSMENT — PATIENT HEALTH QUESTIONNAIRE - PHQ9
5. POOR APPETITE OR OVEREATING: SEVERAL DAYS
SUM OF ALL RESPONSES TO PHQ QUESTIONS 1-9: 10

## 2021-04-28 ASSESSMENT — ANXIETY QUESTIONNAIRES
3. WORRYING TOO MUCH ABOUT DIFFERENT THINGS: MORE THAN HALF THE DAYS
6. BECOMING EASILY ANNOYED OR IRRITABLE: SEVERAL DAYS
IF YOU CHECKED OFF ANY PROBLEMS ON THIS QUESTIONNAIRE, HOW DIFFICULT HAVE THESE PROBLEMS MADE IT FOR YOU TO DO YOUR WORK, TAKE CARE OF THINGS AT HOME, OR GET ALONG WITH OTHER PEOPLE: SOMEWHAT DIFFICULT
7. FEELING AFRAID AS IF SOMETHING AWFUL MIGHT HAPPEN: MORE THAN HALF THE DAYS
1. FEELING NERVOUS, ANXIOUS, OR ON EDGE: NEARLY EVERY DAY
GAD7 TOTAL SCORE: 12
2. NOT BEING ABLE TO STOP OR CONTROL WORRYING: MORE THAN HALF THE DAYS
5. BEING SO RESTLESS THAT IT IS HARD TO SIT STILL: SEVERAL DAYS

## 2021-04-28 NOTE — PROGRESS NOTES
[facility] [address] [place] [address]  Phone: [phone]  Primary Provider: Josefina Aquino        PREOPERATIVE EVALUATION:  Today's date: 4/28/2021    Brent Walters is a 35 year old female who presents for a preoperative evaluation.    Surgical Information:  Surgery/Procedure: Revision of bilateral breast deduction  Surgery Location: [facility]   Surgeon: Dr. Sapna Blanca   Surgery Date: 5/14/21  Time of Surgery: TBD  Where patient plans to recover: At home with family  Fax number for surgical facility: [phone]    Type of Anesthesia Anticipated: General    Assessment & Plan     The proposed surgical procedure is considered INTERMEDIATE risk.    Preop general physical exam    - Hemoglobin A1c  - Hemoglobin    History of breast problem      BMI >35  Continue to work on diet and exercise.      Major depression in complete remission (H)  In remission  The current medical regimen is effective;  continue present plan and medications.           RECOMMENDATION:  APPROVAL GIVEN to proceed with proposed procedure, without further diagnostic evaluation.              Subjective     HPI related to upcoming procedure: Revision of previous breast reduction desired.     Preop Questions 4/25/2021   1. Have you ever had a heart attack or stroke? No   2. Have you ever had surgery on your heart or blood vessels, such as a stent placement, a coronary artery bypass, or surgery on an artery in your head, neck, heart, or legs? No   3. Do you have chest pain with activity? No   4. Do you have a history of  heart failure? No   5. Do you currently have a cold, bronchitis or symptoms of other infection? No   6. Do you have a cough, shortness of breath, or wheezing? No   7. Do you or anyone in your family have previous history of blood clots? No   8. Do you or does anyone in your family have a serious bleeding problem such as prolonged bleeding following  surgeries or cuts? No   9. Have you ever had problems with anemia or been told to take iron pills? No   10. Have you had any abnormal blood loss such as black, tarry or bloody stools, or abnormal vaginal bleeding? No   11. Have you ever had a blood transfusion? No   12. Are you willing to have a blood transfusion if it is medically needed before, during, or after your surgery? Yes   13. Have you or any of your relatives ever had problems with anesthesia? No   14. Do you have sleep apnea, excessive snoring or daytime drowsiness? YES - sleep apnea   14a. Do you have a CPAP machine? Yes   15. Do you have any artifical heart valves or other implanted medical devices like a pacemaker, defibrillator, or continuous glucose monitor? No   16. Do you have artificial joints? No   17. Are you allergic to latex? No   18. Is there any chance that you may be pregnant? No       Health Care Directive:  Patient does not have a Health Care Directive or Living Will:     Preoperative Review of :   reviewed - no record of controlled substances prescribed.      Status of Chronic Conditions:  DEPRESSION - Patient has a long history of Depression of moderate severity requiring medication for control with recent symptoms being stable.Current symptoms of depression include none.       Review of Systems  CONSTITUTIONAL: NEGATIVE for fever, chills, change in weight  INTEGUMENTARY/SKIN: NEGATIVE for worrisome rashes, moles or lesions  EYES: NEGATIVE for vision changes or irritation  ENT/MOUTH: NEGATIVE for ear, mouth and throat problems  RESP: NEGATIVE for significant cough or SOB  BREAST: NEGATIVE for masses, tenderness or discharge  CV: NEGATIVE for chest pain, palpitations or peripheral edema  GI: NEGATIVE for nausea, abdominal pain, heartburn, or change in bowel habits  : NEGATIVE for frequency, dysuria, or hematuria  MUSCULOSKELETAL: NEGATIVE for significant arthralgias or myalgia  NEURO: NEGATIVE for weakness, dizziness or  paresthesias  ENDOCRINE: NEGATIVE for temperature intolerance, skin/hair changes  HEME: NEGATIVE for bleeding problems  PSYCHIATRIC: NEGATIVE for changes in mood or affect    Patient Active Problem List    Diagnosis Date Noted     BMI >35 10/14/2020     Priority: Medium     GBS (group B streptococcus) infection 10/24/2019     Priority: Medium     Final urine culture report on 10/24/19 shows <10,000 colonies/mL Streptococcus agalactiae sero group B.  Patient was treated in ED on 10/22/19 with Rx of Amoxicillin       Spinal headache 10/16/2019     Priority: Medium     Spontaneous vaginal delivery 10/13/2019     Priority: Medium     Gestational diabetes 10/12/2019     Priority: Medium     Major depressive disorder, recurrent episode, moderate (H) 07/08/2019     Priority: Medium     Generalized anxiety disorder 07/08/2019     Priority: Medium     Hyperemesis gravidarum 04/09/2019     Priority: Medium     SAWYER (obstructive sleep apnea) 08/14/2018     Priority: Medium     LSIL (low grade squamous intraepithelial lesion) on Pap smear 11/10/2011     Priority: Medium     02/25/2000: LSIL Pap  10/04/2000: NIL Pap  10/05/2000: ECC Neg for dysplasia  01/29/01: NIL Pap  10/10/03: NIL Pap  2525-9221: NIL Pap  07/20/05: ECC benign  All above results in care everywhere  11/10/11: LSIL. Plan colp.  12/1/11: [place] - QUIQUE I. Plan pap in 6 & 12 months.  7/23/12: NIL pap. Plan pap in 6 months.  1/2013 Pap ASCUS +HPV 34 (probable HR)Per Dr. Ramos. Not one of high risk 14 HPVs so will have pap in 3 yrs.  07/19/16: NIL pap. Plan cotest in 1 year per provider.  08/16/17: NIL pap, + HR HPV (not 16 or 18) result. Plan cotest in 1 year per provider.   08/14/18: NIL pap, + HR HPV (not 16 or 18) result. Plan [place].   08/29/18: [place] Bx neg for dysplasia. Plan cotest in 1 year.   12/09/19 Patient is lost to pap tracking follow-up  10/14/20: NIL Pap, + HR HPV (not 16 or 18). Plan cotest in 1 year due 10/14/21. Results and recommendations released to  the pt thru deep. Pt viewed.         CARDIOVASCULAR SCREENING; LDL GOAL LESS THAN 160 03/08/2011     Priority: Medium     Major depression in complete remission (H) 03/08/2011     Priority: Medium     Anxiety 03/08/2011     Priority: Medium      Past Medical History:   Diagnosis Date     Cervical high risk HPV (human papillomavirus) test positive 08/16/2017 08/16/17,08/14/18, 10/14/20     Depressive disorder     on meds     Diabetes (H)     gestational diabetic     LSIL (low grade squamous intraepithelial lesion) on Pap smear 11/2011    colp - QUIQUE I     Spinal headache 10/16/2019     Past Surgical History:   Procedure Laterality Date     ABDOMEN SURGERY      Tummy tuck     BLOOD PATCH N/A 10/16/2019    Procedure: INJECTION, BLOOD PATCH, EPIDURAL;  Surgeon: Darvin Karimi MD;  Location: [facility] OR     BREAST SURGERY      breast reduction 2016     D & C       SURGICAL HISTORY OF -       tummy tuck     Current Outpatient Medications   Medication Sig Dispense Refill     Cholecalciferol (VITAMIN D PO)        escitalopram (LEXAPRO) 10 MG tablet Take 1 tablet (10 mg) by mouth daily 90 tablet 3     tiZANidine (ZANAFLEX) 4 MG tablet Take 1 tablet (4 mg) by mouth 3 times daily as needed for muscle spasms 30 tablet 5       Allergies   Allergen Reactions     Sulfa Drugs      Sulfas in eye drops, caused redness, irritation, swelling to eyes        Social History     Tobacco Use     Smoking status: Never Smoker     Smokeless tobacco: Never Used     Tobacco comment: none   Substance Use Topics     Alcohol use: Not Currently     Comment: none since pregnancy       History   Drug Use No     Comment: none         Objective     /74 (BP Location: Right arm, Patient Position: Sitting, Cuff Size: Adult Large)   Pulse 91   Temp 98.7  F (37.1  C) (Tympanic)   Resp 16   Wt 109.3 kg (241 lb)   LMP 04/07/2021 (Approximate)   SpO2 98%   Breastfeeding Yes   BMI 37.19 kg/m      Physical Exam    GENERAL APPEARANCE: healthy,  alert and no distress     EYES: EOMI, PERRL     HENT: ear canals and TM's normal and nose and mouth without ulcers or lesions     NECK: no adenopathy, no asymmetry, masses, or scars and thyroid normal to palpation     RESP: lungs clear to auscultation - no rales, rhonchi or wheezes     CV: regular rates and rhythm, normal S1 S2, no S3 or S4 and no murmur, click or rub     ABDOMEN:  soft, nontender, no HSM or masses and bowel sounds normal     MS: extremities normal- no gross deformities noted, no evidence of inflammation in joints, FROM in all extremities.     SKIN: no suspicious lesions or rashes     NEURO: Normal strength and tone, sensory exam grossly normal, mentation intact and speech normal     PSYCH: mentation appears normal. and affect normal/bright     LYMPHATICS: No cervical adenopathy    Recent Labs   Lab Test 10/14/20  1406 10/22/19  1751 10/15/19  0653   HGB  --  11.6* 9.0*   PLT  --  340  --    NA  --  139  --    POTASSIUM  --  3.7  --    CR  --  0.83  --    A1C 5.6  --   --         Diagnostics:  Labs pending at this time.  Results will be reviewed when available.       Revised Cardiac Risk Index (RCRI):  The patient has the following serious cardiovascular risks for perioperative complications:   - No serious cardiac risks = 0 points     RCRI Interpretation: 0 points: Class I (very low risk - 0.4% complication rate)           Signed Electronically by: Josefina Aquino NP  Copy of this evaluation report is provided to requesting physician.

## 2021-04-29 ASSESSMENT — ANXIETY QUESTIONNAIRES: GAD7 TOTAL SCORE: 12

## 2021-09-12 ENCOUNTER — HEALTH MAINTENANCE LETTER (OUTPATIENT)
Age: 36
End: 2021-09-12

## 2021-09-17 ENCOUNTER — E-VISIT (OUTPATIENT)
Dept: FAMILY MEDICINE | Facility: CLINIC | Age: 36
End: 2021-09-17
Payer: COMMERCIAL

## 2021-09-17 DIAGNOSIS — B37.31 CANDIDAL VULVOVAGINITIS: Primary | ICD-10-CM

## 2021-09-17 PROCEDURE — 99421 OL DIG E/M SVC 5-10 MIN: CPT | Performed by: NURSE PRACTITIONER

## 2021-09-17 RX ORDER — FLUCONAZOLE 150 MG/1
150 TABLET ORAL ONCE
Qty: 1 TABLET | Refills: 2 | Status: SHIPPED | OUTPATIENT
Start: 2021-09-17 | End: 2021-09-17

## 2021-09-17 NOTE — PATIENT INSTRUCTIONS
Thank you for choosing us for your care. I have placed an order for a prescription so that you can start treatment. View your full visit summary for details by clicking on the link below. Your pharmacist will able to address any questions you may have about the medication.     If you re not feeling better within 2-3 days, please schedule an appointment.  You can schedule an appointment right here in [facility], or call [phone]  If the visit is for the same symptoms as your eVisit, we ll refund the cost of your eVisit if seen within seven days.      Yeast Infection (Candida Vaginal Infection)    You have a Candida vaginal infection. This is also known as a yeast infection. It's most often caused by a type of yeast (fungus) called Candida. Candida are normally found in the vagina. But if they increase in number, this can lead to infection and cause symptoms.   Symptoms of a yeast infection can include:     Clumpy or thin, white discharge, which may look like cottage cheese    Itching or burning    Burning with urination  Certain factors can make a yeast infection more likely. These can include:     Taking certain medicines, such as antibiotics or birth control pills    Pregnancy    Diabetes    Weak immune system  A yeast infection is most often treated with antifungal medicine. This may be given as a vaginal cream or pills you take by mouth. Treatment may last for about 1 to 7 days. Women with severe or recurrent infections may need longer courses of treatment.   Home care    If you re prescribed medicine, be sure to use it as directed. Finish all of the medicine, even if your symptoms go away. Don t try to treat yourself using over-the-counter products without talking with your provider first. They will let you know if this is a good option for you.    Ask your provider what steps you can take to help reduce your risk of having a yeast infection in the future.    Follow-up care  Follow up with your healthcare  provider, or as directed.   When to seek medical advice  Call your healthcare provider right away if:     You have a fever of 100.4 F (38 C) or higher, or as directed by your provider.    Your symptoms worsen, or they don t go away within a few days of starting treatment.    You have new pain in the lower belly or pelvic region.    You have side effects that bother you or a reaction to the cream or pills you re prescribed.    You or any partners you have sex with have new symptoms, such as a rash, joint pain, or sores.  [employer] [place] last reviewed this educational content on 7/1/2020 2000-2021 [employer]. All rights reserved. This information is not intended as a substitute for professional medical care. Always follow your healthcare professional's instructions.

## 2021-09-30 ENCOUNTER — PATIENT OUTREACH (OUTPATIENT)
Dept: FAMILY MEDICINE | Facility: CLINIC | Age: 36
End: 2021-09-30
Payer: COMMERCIAL

## 2021-12-13 DIAGNOSIS — F33.1 MAJOR DEPRESSIVE DISORDER, RECURRENT EPISODE, MODERATE (H): ICD-10-CM

## 2021-12-13 DIAGNOSIS — F41.9 ANXIETY: ICD-10-CM

## 2021-12-14 RX ORDER — ESCITALOPRAM OXALATE 10 MG/1
10 TABLET ORAL DAILY
Qty: 90 TABLET | Refills: 0 | Status: SHIPPED | OUTPATIENT
Start: 2021-12-14 | End: 2022-01-10

## 2021-12-14 NOTE — TELEPHONE ENCOUNTER
Routing refill request to provider for review/approval because:  --Last PHQ-9 >4 and overdue.      MA  --Please contact patient for PHQ-9.  Thank you.        --Last visit: 9/17/21 [facility] for acute care.    --Future Visit:   Next 5 appointments (look out 90 days)    Pravin 10, 2022 11:00 AM  [facility] Josefina Aquino NP  [facility] ([facility] ) [address] [place] [address]  [phone]          PHQ 12/12/2019 6/14/2020 4/28/2021   PHQ-9 Total Score 2 9 10   Q9: Thoughts of better off dead/self-harm past 2 weeks Not at all Not at all Not at all

## 2022-01-02 ENCOUNTER — HEALTH MAINTENANCE LETTER (OUTPATIENT)
Age: 37
End: 2022-01-02

## 2022-01-09 ASSESSMENT — ENCOUNTER SYMPTOMS
FEVER: 0
SORE THROAT: 0
NERVOUS/ANXIOUS: 1
WEAKNESS: 0
JOINT SWELLING: 0
EYE PAIN: 0
HEMATOCHEZIA: 0
FREQUENCY: 0
HEARTBURN: 0
DYSURIA: 0
HEMATURIA: 0
DIZZINESS: 0
NAUSEA: 0
PALPITATIONS: 0
HEADACHES: 0
MYALGIAS: 1
COUGH: 0
DIARRHEA: 0
BREAST MASS: 0
CONSTIPATION: 0
PARESTHESIAS: 0
CHILLS: 0
ARTHRALGIAS: 1
ABDOMINAL PAIN: 1
SHORTNESS OF BREATH: 0

## 2022-01-10 ENCOUNTER — OFFICE VISIT (OUTPATIENT)
Dept: FAMILY MEDICINE | Facility: CLINIC | Age: 37
End: 2022-01-10
Payer: COMMERCIAL

## 2022-01-10 VITALS
SYSTOLIC BLOOD PRESSURE: 107 MMHG | HEART RATE: 76 BPM | HEIGHT: 68 IN | DIASTOLIC BLOOD PRESSURE: 75 MMHG | OXYGEN SATURATION: 100 % | TEMPERATURE: 97.4 F | RESPIRATION RATE: 16 BRPM | BODY MASS INDEX: 37.28 KG/M2 | WEIGHT: 246 LBS

## 2022-01-10 DIAGNOSIS — F41.9 ANXIETY: ICD-10-CM

## 2022-01-10 DIAGNOSIS — M54.50 BILATERAL LOW BACK PAIN WITHOUT SCIATICA, UNSPECIFIED CHRONICITY: ICD-10-CM

## 2022-01-10 DIAGNOSIS — R73.03 PREDIABETES: ICD-10-CM

## 2022-01-10 DIAGNOSIS — F33.1 MAJOR DEPRESSIVE DISORDER, RECURRENT EPISODE, MODERATE (H): ICD-10-CM

## 2022-01-10 DIAGNOSIS — Z00.00 ROUTINE GENERAL MEDICAL EXAMINATION AT A HEALTH CARE FACILITY: Primary | ICD-10-CM

## 2022-01-10 PROBLEM — O21.0 HYPEREMESIS GRAVIDARUM: Status: RESOLVED | Noted: 2019-04-09 | Resolved: 2022-01-10

## 2022-01-10 PROBLEM — G97.1 SPINAL HEADACHE: Status: RESOLVED | Noted: 2019-10-16 | Resolved: 2022-01-10

## 2022-01-10 LAB
CHOLEST SERPL-MCNC: 205 MG/DL
DEPRECATED CALCIDIOL+CALCIFEROL SERPL-MC: 45 UG/L (ref 20–75)
FASTING STATUS PATIENT QL REPORTED: YES
HBA1C MFR BLD: 5.9 % (ref 0–5.6)
HDLC SERPL-MCNC: 42 MG/DL
LDLC SERPL CALC-MCNC: 127 MG/DL
NONHDLC SERPL-MCNC: 163 MG/DL
TRIGL SERPL-MCNC: 181 MG/DL

## 2022-01-10 PROCEDURE — 99214 OFFICE O/P EST MOD 30 MIN: CPT | Mod: 25 | Performed by: NURSE PRACTITIONER

## 2022-01-10 PROCEDURE — 96127 BRIEF EMOTIONAL/BEHAV ASSMT: CPT | Mod: 59 | Performed by: NURSE PRACTITIONER

## 2022-01-10 PROCEDURE — G0145 SCR C/V CYTO,THINLAYER,RESCR: HCPCS | Performed by: NURSE PRACTITIONER

## 2022-01-10 PROCEDURE — 83036 HEMOGLOBIN GLYCOSYLATED A1C: CPT | Performed by: NURSE PRACTITIONER

## 2022-01-10 PROCEDURE — 36415 COLL VENOUS BLD VENIPUNCTURE: CPT | Performed by: NURSE PRACTITIONER

## 2022-01-10 PROCEDURE — 87624 HPV HI-RISK TYP POOLED RSLT: CPT | Performed by: NURSE PRACTITIONER

## 2022-01-10 PROCEDURE — 80061 LIPID PANEL: CPT | Performed by: NURSE PRACTITIONER

## 2022-01-10 PROCEDURE — 82306 VITAMIN D 25 HYDROXY: CPT | Performed by: NURSE PRACTITIONER

## 2022-01-10 PROCEDURE — 99395 PREV VISIT EST AGE 18-39: CPT | Performed by: NURSE PRACTITIONER

## 2022-01-10 RX ORDER — ESCITALOPRAM OXALATE 10 MG/1
10 TABLET ORAL DAILY
Qty: 90 TABLET | Refills: 3 | Status: SHIPPED | OUTPATIENT
Start: 2022-01-10 | End: 2023-03-21

## 2022-01-10 ASSESSMENT — ANXIETY QUESTIONNAIRES
4. TROUBLE RELAXING: NOT AT ALL
5. BEING SO RESTLESS THAT IT IS HARD TO SIT STILL: NOT AT ALL
GAD7 TOTAL SCORE: 6
GAD7 TOTAL SCORE: 6
7. FEELING AFRAID AS IF SOMETHING AWFUL MIGHT HAPPEN: SEVERAL DAYS
3. WORRYING TOO MUCH ABOUT DIFFERENT THINGS: SEVERAL DAYS
7. FEELING AFRAID AS IF SOMETHING AWFUL MIGHT HAPPEN: SEVERAL DAYS
2. NOT BEING ABLE TO STOP OR CONTROL WORRYING: SEVERAL DAYS
6. BECOMING EASILY ANNOYED OR IRRITABLE: SEVERAL DAYS
GAD7 TOTAL SCORE: 6
1. FEELING NERVOUS, ANXIOUS, OR ON EDGE: MORE THAN HALF THE DAYS

## 2022-01-10 ASSESSMENT — ENCOUNTER SYMPTOMS
DIZZINESS: 0
CONSTIPATION: 0
FREQUENCY: 0
PARESTHESIAS: 0
NERVOUS/ANXIOUS: 1
HEARTBURN: 0
MYALGIAS: 1
DIARRHEA: 0
PALPITATIONS: 0
CHILLS: 0
FEVER: 0
HEMATURIA: 0
EYE PAIN: 0
SHORTNESS OF BREATH: 0
HEADACHES: 0
NAUSEA: 0
ABDOMINAL PAIN: 1
SORE THROAT: 0
ARTHRALGIAS: 1
HEMATOCHEZIA: 0
WEAKNESS: 0
JOINT SWELLING: 0
COUGH: 0
BREAST MASS: 0
DYSURIA: 0

## 2022-01-10 ASSESSMENT — PATIENT HEALTH QUESTIONNAIRE - PHQ9
SUM OF ALL RESPONSES TO PHQ QUESTIONS 1-9: 7
SUM OF ALL RESPONSES TO PHQ QUESTIONS 1-9: 7
10. IF YOU CHECKED OFF ANY PROBLEMS, HOW DIFFICULT HAVE THESE PROBLEMS MADE IT FOR YOU TO DO YOUR WORK, TAKE CARE OF THINGS AT HOME, OR GET ALONG WITH OTHER PEOPLE: SOMEWHAT DIFFICULT

## 2022-01-10 ASSESSMENT — MIFFLIN-ST. JEOR: SCORE: 1849.35

## 2022-01-10 NOTE — PROGRESS NOTES
SUBJECTIVE:   CC: Brent Walters is an 36 year old woman who presents for preventive health visit.   Patient has been advised of split billing requirements and indicates understanding: Yes  Healthy Habits:     Getting at least 3 servings of Calcium per day:  Yes    Bi-annual eye exam:  Yes    Dental care twice a year:  Yes    Sleep apnea or symptoms of sleep apnea:  Sleep apnea    Diet:  Regular (no restrictions)    Frequency of exercise:  2-3 days/week    Duration of exercise:  Other    Taking medications regularly:  Yes    Medication side effects:  None    PHQ-2 Total Score: 2    Additional concerns today:  Yes    She is doing well on her current dose of Lexapro and denies any side effects.  She does have a more difficult time with her mood in the winter.  She is taking 5000 international unit(s) of vitamin D and exercising regularly.  She needs to occasionally take Tizanidine for neck and back pain.    Today's PHQ-2 Score:   PHQ-2 ( 1999 Pfizer) 1/9/2022   Q1: Little interest or pleasure in doing things 1   Q2: Feeling down, depressed or hopeless 1   PHQ-2 Score 2   PHQ-2 Total Score (12-17 Years)- Positive if 3 or more points; Administer PHQ-A if positive 2   Q1: Little interest or pleasure in doing things Several days   Q2: Feeling down, depressed or hopeless Several days   PHQ-2 Score 2     Answers for HPI/ROS submitted by the patient on 1/10/2022  If you checked off any problems, how difficult have these problems made it for you to do your work, take care of things at home, or get along with other people?: Somewhat difficult  PHQ9 TOTAL SCORE: 7  ELLIOTT 7 TOTAL SCORE: 6      Abuse: Current or Past (Physical, Sexual or Emotional) - No  Do you feel safe in your environment? Yes    Have you ever done Advance Care Planning? (For example, a Health Directive, POLST, or a discussion with a medical provider or your loved ones about your wishes): No, advance care planning information given to patient to review.   Patient declined advance care planning discussion at this time.    Social History     Tobacco Use     Smoking status: Never Smoker     Smokeless tobacco: Never Used     Tobacco comment: none   Substance Use Topics     Alcohol use: Yes     Comment: Occasionally     If you drink alcohol do you typically have >3 drinks per day or >7 drinks per week? No    Alcohol Use 1/10/2022   Prescreen: >3 drinks/day or >7 drinks/week? -   Prescreen: >3 drinks/day or >7 drinks/week? No       Reviewed orders with patient.  Reviewed health maintenance and updated orders accordingly - Yes      Breast Cancer Screening:    Breast CA Risk Assessment (FHS-7) 1/9/2022   Do you have a family history of breast, colon, or ovarian cancer? No / Unknown           Pertinent mammograms are reviewed under the imaging tab.    History of abnormal Pap smear: YES - other categories - see link Cervical Cytology Screening Guidelines  PAP / HPV Latest Ref Rng & Units 10/14/2020 8/14/2018 8/16/2017   PAP (Historical) - NIL NIL NIL   HPV16 NEG:Negative Negative Negative Negative   HPV18 NEG:Negative Negative Negative Negative   HRHPV NEG:Negative Positive(A) Positive(A) Positive(A)     Reviewed and updated as needed this visit by clinical staff  Tobacco  Allergies  Meds  Problems  Med Hx  Surg Hx  Fam Hx  Soc Hx         Reviewed and updated as needed this visit by Provider  Tobacco  Allergies  Meds  Problems  Med Hx  Surg Hx  Fam Hx            Review of Systems   Constitutional: Negative for chills and fever.   HENT: Negative for congestion, ear pain, hearing loss and sore throat.    Eyes: Negative for pain and visual disturbance.   Respiratory: Negative for cough and shortness of breath.    Cardiovascular: Negative for chest pain, palpitations and peripheral edema.   Gastrointestinal: Positive for abdominal pain. Negative for constipation, diarrhea, heartburn, hematochezia and nausea.   Breasts:  Negative for tenderness, breast mass and  "discharge.   Genitourinary: Negative for dysuria, frequency, genital sores, hematuria, pelvic pain, urgency, vaginal bleeding and vaginal discharge.   Musculoskeletal: Positive for arthralgias and myalgias. Negative for joint swelling.   Skin: Negative for rash.   Neurological: Negative for dizziness, weakness, headaches and paresthesias.   Psychiatric/Behavioral: Negative for mood changes. The patient is nervous/anxious.           OBJECTIVE:   /75   Pulse 76   Temp 97.4  F (36.3  C) (Temporal)   Resp 16   Ht 1.727 m (5' 8\")   Wt 111.6 kg (246 lb)   LMP  (LMP Unknown)   SpO2 100%   Breastfeeding Yes   BMI 37.40 kg/m    Physical Exam  GENERAL: healthy, alert and no distress  EYES: Eyes grossly normal to inspection, PERRL and conjunctivae and sclerae normal  HENT: ear canals and TM's normal, nose and mouth without ulcers or lesions  NECK: no adenopathy, no asymmetry, masses, or scars and thyroid normal to palpation  RESP: lungs clear to auscultation - no rales, rhonchi or wheezes  BREAST: normal without masses, tenderness or nipple discharge and no palpable axillary masses or adenopathy  CV: regular rate and rhythm, normal S1 S2, no S3 or S4, no murmur, click or rub, no peripheral edema and peripheral pulses strong  ABDOMEN: soft, nontender, no hepatosplenomegaly, no masses and bowel sounds normal   (female): normal female external genitalia, normal urethral meatus, vaginal mucosa pink, moist, well rugated, and normal cervix/adnexa/uterus without masses or discharge  MS: no gross musculoskeletal defects noted, no edema  SKIN: no suspicious lesions or rashes  NEURO: Normal strength and tone, mentation intact and speech normal  PSYCH: mentation appears normal, affect normal/bright        ASSESSMENT/PLAN:   (Z00.00) Routine general medical examination at a health care facility  (primary encounter diagnosis)  Comment:   Plan: PAP screen with HPV - recommended age 30 - 65         years, Vitamin D " "Deficiency, Lipid panel reflex        to direct LDL Non-fasting            (F41.9) Anxiety  Comment: stable  Plan: escitalopram (LEXAPRO) 10 MG tablet        The current medical regimen is effective;  continue present plan and medications.     (F33.1) Major depressive disorder, recurrent episode, moderate (H)  Comment: in partial remission  Plan: escitalopram (LEXAPRO) 10 MG tablet       She would like to stay on her current dose of medication.  The current medical regimen is effective;  continue present plan and medications.     (M54.50) Bilateral low back pain without sciatica, unspecified chronicity  Comment:   Plan: tiZANidine (ZANAFLEX) 4 MG tablet        Refills given.     (R73.03) Prediabetes  Comment:   Plan: Hemoglobin A1c        Discussed diet and exercise.       Patient has been advised of split billing requirements and indicates understanding:   COUNSELING:  Reviewed preventive health counseling, as reflected in patient instructions    Estimated body mass index is 37.4 kg/m  as calculated from the following:    Height as of this encounter: 1.727 m (5' 8\").    Weight as of this encounter: 111.6 kg (246 lb).    Weight management plan: Discussed healthy diet and exercise guidelines    She reports that she has never smoked. She has never used smokeless tobacco.      Counseling Resources:  ATP IV Guidelines  Pooled Cohorts Equation Calculator  Breast Cancer Risk Calculator  BRCA-Related Cancer Risk Assessment: FHS-7 Tool  FRAX Risk Assessment  ICSI Preventive Guidelines  Dietary Guidelines for Americans, 2010  USDA's MyPlate  ASA Prophylaxis  Lung CA Screening    Josefina Aquino NP  [facility]  "

## 2022-01-11 ASSESSMENT — ANXIETY QUESTIONNAIRES: GAD7 TOTAL SCORE: 6

## 2022-01-12 LAB
BKR LAB AP GYN ADEQUACY: NORMAL
BKR LAB AP GYN INTERPRETATION: NORMAL
BKR LAB AP HPV REFLEX: NORMAL
BKR LAB AP PREVIOUS ABNL DX: NORMAL
BKR LAB AP PREVIOUS ABNORMAL: NORMAL
PATH REPORT.COMMENTS IMP SPEC: NORMAL
PATH REPORT.COMMENTS IMP SPEC: NORMAL
PATH REPORT.RELEVANT HX SPEC: NORMAL

## 2022-01-13 LAB
HUMAN PAPILLOMA VIRUS 16 DNA: NEGATIVE
HUMAN PAPILLOMA VIRUS 18 DNA: NEGATIVE
HUMAN PAPILLOMA VIRUS FINAL DIAGNOSIS: ABNORMAL
HUMAN PAPILLOMA VIRUS OTHER HR: POSITIVE

## 2022-01-14 ENCOUNTER — PATIENT OUTREACH (OUTPATIENT)
Dept: FAMILY MEDICINE | Facility: CLINIC | Age: 37
End: 2022-01-14
Payer: COMMERCIAL

## 2022-01-27 ENCOUNTER — MYC MEDICAL ADVICE (OUTPATIENT)
Dept: OBGYN | Facility: CLINIC | Age: 37
End: 2022-01-27
Payer: COMMERCIAL

## 2022-02-15 ENCOUNTER — OFFICE VISIT (OUTPATIENT)
Dept: OBGYN | Facility: CLINIC | Age: 37
End: 2022-02-15
Payer: COMMERCIAL

## 2022-02-15 VITALS
SYSTOLIC BLOOD PRESSURE: 121 MMHG | BODY MASS INDEX: 38.14 KG/M2 | OXYGEN SATURATION: 98 % | DIASTOLIC BLOOD PRESSURE: 75 MMHG | WEIGHT: 243 LBS | HEART RATE: 108 BPM | HEIGHT: 67 IN

## 2022-02-15 DIAGNOSIS — Z32.00 PREGNANCY EXAMINATION OR TEST, PREGNANCY UNCONFIRMED: ICD-10-CM

## 2022-02-15 DIAGNOSIS — B97.7 HPV (HUMAN PAPILLOMA VIRUS) INFECTION: Primary | ICD-10-CM

## 2022-02-15 LAB — HCG UR QL: NEGATIVE

## 2022-02-15 PROCEDURE — 88305 TISSUE EXAM BY PATHOLOGIST: CPT | Performed by: PATHOLOGY

## 2022-02-15 PROCEDURE — 81025 URINE PREGNANCY TEST: CPT | Performed by: OBSTETRICS & GYNECOLOGY

## 2022-02-15 PROCEDURE — 57456 ENDOCERV CURETTAGE W/SCOPE: CPT | Performed by: OBSTETRICS & GYNECOLOGY

## 2022-02-15 ASSESSMENT — MIFFLIN-ST. JEOR: SCORE: 1819.87

## 2022-02-15 NOTE — PATIENT INSTRUCTIONS

## 2022-02-15 NOTE — PROGRESS NOTES
Brent Walters is a 36 year old female  who presents for repeat colposcopy, referred by 1/10/22. Pap smear on 1/10/2022 showed: normal and with high risk HPV present: other. The prior pap showed normal and with high risk HPV present: other.       No LMP recorded.  UPT today is negative  Patient does not smoke  Type of contraception: none  Age at first sexual intercourse: 13  Number of sexual partners (lifetime): >6  Past GYN history: Chlamydia and Trichomonas  Prior cervical/vaginal disease: none.  Prior cervical treatment: D&C.      PROCEDURE:      Before the procedure, it was ensured that the patient was educated regarding the nature of her findings to date, the implications, and what was to be done. She has been made aware of the role of HPV, the natural history of infection, ways to minimize her future risk, the effect of HPV on the cervix, and treatment options available should they be indicated. The details of the colposcopic procedure were reviewed. All questions were answered before proceeding, and informed consent was therefore obtained.      Speculum placed in vagina and excellent visualization of cervix acheived, cervix swabbed x 3 with acetic acid solution.      FINDINGS:  Cervix: no visible lesions  Please refer to images section for details.  SCJ seen?: yes   ECC done?: Yes   Satisfactory examination?: yes      ASSESSMENT: HPV related changes.  PLAN: specimens labelled and sent to Pathology, will base further treatment on Pathology findings, treatment options discussed with patient and post biopsy instructions given to patient    Josefina Eid MD

## 2022-02-17 ENCOUNTER — PATIENT OUTREACH (OUTPATIENT)
Dept: OBGYN | Facility: CLINIC | Age: 37
End: 2022-02-17
Payer: COMMERCIAL

## 2022-02-17 DIAGNOSIS — R87.612 LOW GRADE SQUAMOUS INTRAEPITH LESION ON CYTOLOGIC SMEAR CERVIX (LGSIL): ICD-10-CM

## 2022-02-17 LAB
PATH REPORT.COMMENTS IMP SPEC: NORMAL
PATH REPORT.COMMENTS IMP SPEC: NORMAL
PATH REPORT.FINAL DX SPEC: NORMAL
PATH REPORT.GROSS SPEC: NORMAL
PATH REPORT.MICROSCOPIC SPEC OTHER STN: NORMAL
PATH REPORT.RELEVANT HX SPEC: NORMAL
PHOTO IMAGE: NORMAL

## 2022-02-23 ENCOUNTER — MYC MEDICAL ADVICE (OUTPATIENT)
Dept: FAMILY MEDICINE | Facility: CLINIC | Age: 37
End: 2022-02-23
Payer: COMMERCIAL

## 2022-02-23 DIAGNOSIS — F32.9 MAJOR DEPRESSION: Primary | ICD-10-CM

## 2022-02-23 DIAGNOSIS — F33.1 MAJOR DEPRESSIVE DISORDER, RECURRENT EPISODE, MODERATE (H): ICD-10-CM

## 2022-02-23 DIAGNOSIS — F41.1 GENERALIZED ANXIETY DISORDER: ICD-10-CM

## 2022-02-23 DIAGNOSIS — F41.9 ANXIETY: ICD-10-CM

## 2022-02-23 RX ORDER — ESCITALOPRAM OXALATE 20 MG/1
20 TABLET ORAL DAILY
Qty: 90 TABLET | Refills: 1 | Status: SHIPPED | OUTPATIENT
Start: 2022-02-23 | End: 2022-04-19 | Stop reason: SINTOL

## 2022-03-17 ENCOUNTER — MYC MEDICAL ADVICE (OUTPATIENT)
Dept: FAMILY MEDICINE | Facility: CLINIC | Age: 37
End: 2022-03-17
Payer: COMMERCIAL

## 2022-03-17 NOTE — TELEPHONE ENCOUNTER
WAIT FOR PCP    R[place],    Advised virtual visit for HIEN form.   Are you ok to fit her into a hold spot or do you know situation enough you do not need a visit?    Sylvie Arteaga RN BSN

## 2022-03-17 NOTE — TELEPHONE ENCOUNTER
Update: Patient reports forms need completion by end of next week.    Writer responded via [employer].    BHARAT CisnerosN, RN  [facility]

## 2022-03-18 ENCOUNTER — TELEPHONE (OUTPATIENT)
Dept: FAMILY MEDICINE | Facility: CLINIC | Age: 37
End: 2022-03-18
Payer: COMMERCIAL

## 2022-03-18 NOTE — TELEPHONE ENCOUNTER
Reason for Call:  Form, our goal is to have forms completed with 72 hours, however, some forms may require a visit or additional information.    Type of letter, form or note:  disability    Who is the form from?: [place] (if other please explain)    Where did the form come from: form was faxed in    What clinic location was the form placed at?: [facility]    Where the form was placed: [employer] form folder POD C    What number is listed as a contact on the form?: N/A       Additional comments: See 3/17 TE    Call taken on 3/18/2022 at 3:15 PM by Ute Stoll

## 2022-03-21 NOTE — TELEPHONE ENCOUNTER
Forms placed in forms folder for RAUL Aquino CNP.    Writer responded via [employer].    PATRICIA Cisneros, RN  [facility]

## 2022-03-21 NOTE — TELEPHONE ENCOUNTER
Video visit tentatively scheduled on 3/23/22 at 1610.    Writer responded via [employer].    BHARAT CisnerosN, RN  [facility]

## 2022-03-23 ENCOUNTER — VIRTUAL VISIT (OUTPATIENT)
Dept: FAMILY MEDICINE | Facility: CLINIC | Age: 37
End: 2022-03-23
Payer: COMMERCIAL

## 2022-03-23 DIAGNOSIS — F41.1 GENERALIZED ANXIETY DISORDER: ICD-10-CM

## 2022-03-23 DIAGNOSIS — F33.1 MAJOR DEPRESSIVE DISORDER, RECURRENT EPISODE, MODERATE (H): Primary | ICD-10-CM

## 2022-03-23 PROCEDURE — 96127 BRIEF EMOTIONAL/BEHAV ASSMT: CPT | Mod: GT | Performed by: NURSE PRACTITIONER

## 2022-03-23 PROCEDURE — 99215 OFFICE O/P EST HI 40 MIN: CPT | Mod: GT | Performed by: NURSE PRACTITIONER

## 2022-03-23 ASSESSMENT — PATIENT HEALTH QUESTIONNAIRE - PHQ9
SUM OF ALL RESPONSES TO PHQ QUESTIONS 1-9: 17
10. IF YOU CHECKED OFF ANY PROBLEMS, HOW DIFFICULT HAVE THESE PROBLEMS MADE IT FOR YOU TO DO YOUR WORK, TAKE CARE OF THINGS AT HOME, OR GET ALONG WITH OTHER PEOPLE: EXTREMELY DIFFICULT
SUM OF ALL RESPONSES TO PHQ QUESTIONS 1-9: 17

## 2022-03-23 NOTE — PROGRESS NOTES
Brent is a 36 year old who is being evaluated via a billable video visit.      How would you like to obtain your AVS? MyChart  If the video visit is dropped, the invitation should be resent by: Text to cell phone: [phone]  Will anyone else be joining your video visit? No    Video Start Time: 4:49 PM    Assessment & Plan     (F33.1) Major depressive disorder, recurrent episode, moderate (H)  (primary encounter diagnosis)  Comment: worsened  Plan: Discussed taking time off of work, close and frequent follow up with her therapist and the option of adding a medication such as Abilify to her Lexapro or changing her Lexapro all together.  She would like to not make any medication changes at this time.  Will follow up in one month.    FMLA and STD papers completed.      (F41.1) Generalized anxiety disorder  Comment: worsened  Plan: See above.      43 minutes spent on the date of the encounter doing chart review, patient visit and documentation            No follow-ups on file.    Josefina Aquino NP  [facility]   Brent is a 36 year old who presents for the following health issues     History of Present Illness       Mental Health Follow-up:                    Today's PHQ-9         PHQ-9 Total Score: 17  PHQ-9 Q9 Thoughts of better off dead/self-harm past 2 weeks :   (P) Not at all    How difficult have these problems made it for you to do your work, take care of things at home, or get along with other people: Extremely difficult        Reason for visit:  Fmla paperwork  Symptom onset:  More than a month  Symptom intensity:  Severe  Symptom progression:  Worsening  Had these symptoms before:  Yes  Has tried/received treatment for these symptoms:  Yes    She eats 0-1 servings of fruits and vegetables daily.She consumes 0 sweetened beverage(s) daily.She exercises with enough effort to increase her heart rate 9 or less minutes per day.  She exercises with enough effort to  "increase her heart rate 3 or less days per week.   She is taking medications regularly.       She felt like she got to a \"breaking point\" and took a leave of absence from her job 3 days ago.  Her anxiety and depression is high, much worse than normal.  Difficulty sleeping, unable to relax, brain won't shut off, forgetful, foggy brain, fatigued, low motivation.  Difficult to go to work and do her work - too hard to push through.  Difficult to shower, get dressed.  She denies any SI.   She has a therapist and is increasing her visits to weekly.     She is taking Lexapro 10 mg, tried increasing to 20 mg but this caused too much drowsiness (had this side effects even on 15 mg).  She is reluctant to make any medication changes.         Review of Systems         Objective           Vitals:  No vitals were obtained today due to virtual visit.    Physical Exam   GENERAL: Healthy, alert and no distress  EYES: Eyes grossly normal to inspection.  No discharge or erythema, or obvious scleral/conjunctival abnormalities.  RESP: No audible wheeze, cough, or visible cyanosis.  No visible retractions or increased work of breathing.    SKIN: Visible skin clear. No significant rash, abnormal pigmentation or lesions.  NEURO: Cranial nerves grossly intact.  Mentation and speech appropriate for age.  PSYCH: Mentation appears normal, affect slightly anxious, judgement and insight intact, normal speech and appearance well-groomed. PHQ-9 score of 17; ELLIOTT-7 score of 6                Video-Visit Details    Type of service:  Video Visit    Video End Taime:5:19    Originating Location (pt. Location): Home    Distant Location (provider location):  [facility]     Platform used for Video Visit: Nidhi"

## 2022-03-23 NOTE — LETTER
My Depression Action Plan  Name: Brent Walters   Date of Birth 1985  Date: 3/23/2022    My doctor: Josefina Aquino   My clinic: [facility] [address] [place] [address]  [phone]          GREEN    ZONE   Good Control    What it looks like:     Things are going generally well. You have normal ups and downs. You may even feel depressed from time to time, but bad moods usually last less than a day.   What you need to do:  1. Continue to care for yourself (see self care plan)  2. Check your depression survival kit and update it as needed  3. Follow your physician s recommendations including any medication.  4. Do not stop taking medication unless you consult with your physician first.           YELLOW         ZONE Getting Worse    What it looks like:     Depression is starting to interfere with your life.     It may be hard to get out of bed; you may be starting to isolate yourself from others.    Symptoms of depression are starting to last most all day and this has happened for several days.     You may have suicidal thoughts but they are not constant.   What you need to do:     1. Call your care team. Your response to treatment will improve if you keep your care team informed of your progress. Yellow periods are signs an adjustment may need to be made.     2. Continue your self-care.  Just get dressed and ready for the day.  Don't give yourself time to talk yourself out of it.    3. Talk to someone in your support network.    4. Open up your Depression Self-Care Plan/Wellness Kit.           RED    ZONE Medical Alert - Get Help    What it looks like:     Depression is seriously interfering with your life.     You may experience these or other symptoms: You can t get out of bed most days, can t work or engage in other necessary activities, you have trouble taking care of basic hygiene, or basic responsibilities, thoughts of suicide or death that will  not go away, self-injurious behavior.     What you need to do:  1. Call your care team and request a same-day appointment. If they are not available (weekends or after hours) call your local crisis line, emergency room or 911.          Depression Self-Care Plan / Wellness Kit    Many people find that medication and therapy are helpful treatments for managing depression. In addition, making small changes to your everyday life can help to boost your mood and improve your wellbeing. Below are some tips for you to consider. Be sure to talk with your medical provider and/or behavioral health consultant if your symptoms are worsening or not improving.     Sleep   Sleep hygiene  means all of the habits that support good, restful sleep. It includes maintaining a consistent bedtime and wake time, using your bedroom only for sleeping or sex, and keeping the bedroom dark and free of distractions like a computer, smartphone, or television.     Develop a Healthy Routine  Maintain good hygiene. Get out of bed in the morning, make your bed, brush your teeth, take a shower, and get dressed. Don t spend too much time viewing media that makes you feel stressed. Find time to relax each day.    Exercise  Get some form of exercise every day. This will help reduce pain and release endorphins, the  feel good  chemicals in your brain. It can be as simple as just going for a walk or doing some gardening, anything that will get you moving.      Diet  Strive to eat healthy foods, including fruits and vegetables. Drink plenty of water. Avoid excessive sugar, caffeine, alcohol, and other mood-altering substances.     Stay Connected with Others  Stay in touch with friends and family members.    Manage Your Mood  Try deep breathing, massage therapy, biofeedback, or meditation. Take part in fun activities when you can. Try to find something to smile about each day.     Psychotherapy  Be open to working with a therapist if your provider recommends  it.     Medication  Be sure to take your medication as prescribed. Most anti-depressants need to be taken every day. It usually takes several weeks for medications to work. Not all medicines work for all people. It is important to follow-up with your provider to make sure you have a treatment plan that is working for you. Do not stop your medication abruptly without first discussing it with your provider.    Crisis Resources   These hotlines are for both adults and children. They and are open 24 hours a day, 7 days a week unless noted otherwise.      National Suicide Prevention Lifeline   0-261-854-TALK (6502)      Crisis Text Line    www.crisistextline.org  Text HOME to 440682 from anywhere in the [place], anytime, about any type of crisis. A live, trained crisis counselor will receive the text and respond quickly.      [place] Lifeline for LGBTQ Youth  A national crisis intervention and suicide lifeline for LGBTQ youth under 25. Provides a safe place to talk without judgement. Call 1-713.482.8601; text START to 498340 or visit www.thetrevorproject.org to talk to a trained counselor.      For [facility] crisis numbers, visit the [facility] website at:  https://[place].gov/dhs/people-we-serve/adults/health-care/mental-health/resources/crisis-contacts.jsp

## 2022-03-24 ASSESSMENT — PATIENT HEALTH QUESTIONNAIRE - PHQ9: SUM OF ALL RESPONSES TO PHQ QUESTIONS 1-9: 17

## 2022-03-30 ENCOUNTER — MYC MEDICAL ADVICE (OUTPATIENT)
Dept: FAMILY MEDICINE | Facility: CLINIC | Age: 37
End: 2022-03-30
Payer: COMMERCIAL

## 2022-03-30 DIAGNOSIS — G47.00 INSOMNIA, UNSPECIFIED TYPE: Primary | ICD-10-CM

## 2022-03-31 NOTE — TELEPHONE ENCOUNTER
Roro- patient recently had virtual visit with you 3/29- wondering if trazodone could be added for sleep    Pharmacy pended    Samaria Hernandez, BSN RN  [facility]

## 2022-04-03 RX ORDER — TRAZODONE HYDROCHLORIDE 50 MG/1
50 TABLET, FILM COATED ORAL AT BEDTIME
Qty: 90 TABLET | Refills: 3 | Status: SHIPPED | OUTPATIENT
Start: 2022-04-03 | End: 2022-04-19

## 2022-04-04 NOTE — TELEPHONE ENCOUNTER
Writer responded via [employer].    BHARAT CisnerosN, RN  [facility]

## 2022-04-14 ENCOUNTER — MYC MEDICAL ADVICE (OUTPATIENT)
Dept: FAMILY MEDICINE | Facility: CLINIC | Age: 37
End: 2022-04-14
Payer: COMMERCIAL

## 2022-04-14 NOTE — TELEPHONE ENCOUNTER
See RN response to patient's mychart message re:workplace restrictions and paperworki    BHARAT GandaraN RN  [facility]

## 2022-04-18 NOTE — TELEPHONE ENCOUNTER
Appt tentatively scheduled on 4/19/22.    Writer responded via [employer]Star.    PATRICIA Cisneros, RN  [facility]

## 2022-04-18 NOTE — TELEPHONE ENCOUNTER
Writer responded via [employer].    BHARAT CisnerosN, RN  [facility]

## 2022-04-19 ENCOUNTER — OFFICE VISIT (OUTPATIENT)
Dept: FAMILY MEDICINE | Facility: CLINIC | Age: 37
End: 2022-04-19
Payer: COMMERCIAL

## 2022-04-19 VITALS
DIASTOLIC BLOOD PRESSURE: 80 MMHG | RESPIRATION RATE: 16 BRPM | OXYGEN SATURATION: 94 % | TEMPERATURE: 98.6 F | SYSTOLIC BLOOD PRESSURE: 112 MMHG | BODY MASS INDEX: 38.06 KG/M2 | WEIGHT: 243 LBS | HEART RATE: 86 BPM

## 2022-04-19 DIAGNOSIS — M54.50 BILATERAL LOW BACK PAIN WITHOUT SCIATICA, UNSPECIFIED CHRONICITY: ICD-10-CM

## 2022-04-19 DIAGNOSIS — F33.1 MAJOR DEPRESSIVE DISORDER, RECURRENT EPISODE, MODERATE (H): Primary | ICD-10-CM

## 2022-04-19 DIAGNOSIS — F41.1 GENERALIZED ANXIETY DISORDER: ICD-10-CM

## 2022-04-19 PROCEDURE — 96127 BRIEF EMOTIONAL/BEHAV ASSMT: CPT | Mod: 59 | Performed by: NURSE PRACTITIONER

## 2022-04-19 PROCEDURE — 99215 OFFICE O/P EST HI 40 MIN: CPT | Performed by: NURSE PRACTITIONER

## 2022-04-19 ASSESSMENT — ANXIETY QUESTIONNAIRES
4. TROUBLE RELAXING: SEVERAL DAYS
GAD7 TOTAL SCORE: 15
3. WORRYING TOO MUCH ABOUT DIFFERENT THINGS: NEARLY EVERY DAY
2. NOT BEING ABLE TO STOP OR CONTROL WORRYING: NEARLY EVERY DAY
5. BEING SO RESTLESS THAT IT IS HARD TO SIT STILL: SEVERAL DAYS
GAD7 TOTAL SCORE: 15
7. FEELING AFRAID AS IF SOMETHING AWFUL MIGHT HAPPEN: MORE THAN HALF THE DAYS
1. FEELING NERVOUS, ANXIOUS, OR ON EDGE: NEARLY EVERY DAY
7. FEELING AFRAID AS IF SOMETHING AWFUL MIGHT HAPPEN: MORE THAN HALF THE DAYS
GAD7 TOTAL SCORE: 15
6. BECOMING EASILY ANNOYED OR IRRITABLE: MORE THAN HALF THE DAYS

## 2022-04-19 ASSESSMENT — PATIENT HEALTH QUESTIONNAIRE - PHQ9
SUM OF ALL RESPONSES TO PHQ QUESTIONS 1-9: 16
10. IF YOU CHECKED OFF ANY PROBLEMS, HOW DIFFICULT HAVE THESE PROBLEMS MADE IT FOR YOU TO DO YOUR WORK, TAKE CARE OF THINGS AT HOME, OR GET ALONG WITH OTHER PEOPLE: VERY DIFFICULT
SUM OF ALL RESPONSES TO PHQ QUESTIONS 1-9: 16

## 2022-04-19 NOTE — PROGRESS NOTES
Assessment & Plan     (F33.1) Major depressive disorder, recurrent episode, moderate (H)  (primary encounter diagnosis)  Comment: slightly improved  Plan: Will continue to work with the therapist 1-2 times per week.  STD forms completed.  She would benefit from another 4 weeks off of work and to work 4 days a week upon her return.  Follow up with e-visit in one month.     (F41.1) Generalized anxiety disorder  Comment: slight improvement   Plan: See above.     (M54.50) Bilateral low back pain without sciatica, unspecified chronicity  Comment:   Plan: tiZANidine (ZANAFLEX) 4 MG tablet        Refills given.         44 minutes spent on the date of the encounter doing chart review, patient visit and documentation            No follow-ups on file.    Josefina Aquino NP  [facility]    Maxi Kennedy is a 36 year old who presents for the following health issues     History of Present Illness       Mental Health Follow-up:  Patient presents to follow-up on Depression & Anxiety.Patient's depression since last visit has been:  Medium  The patient is not having other symptoms associated with depression.  Patient's anxiety since last visit has been:  Medium  The patient is not having other symptoms associated with anxiety.  Any significant life events: No  Patient is feeling anxious or having panic attacks.  Patient has no concerns about alcohol or drug use.       Today's PHQ-9         PHQ-9 Total Score: 16  PHQ-9 Q9 Thoughts of better off dead/self-harm past 2 weeks :   (P) Not at all    How difficult have these problems made it for you to do your work, take care of things at home, or get along with other people: Very difficult    Today's ELLIOTT-7 Score: 15    She eats 2-3 servings of fruits and vegetables daily.She consumes 0 sweetened beverage(s) daily.She exercises with enough effort to increase her heart rate 30 to 60 minutes per day.  She exercises with enough effort to increase her heart  "rate 3 or less days per week.   She is taking medications regularly.       She has been working through some things with her therapist.  Overall, she feels that things are somewhat improved compared to our last visit a month ago.  Less anger, but still having a lot of sadness, grief.  She had side effects on doses higher than 10 mg of Lexapro, feels 10 mg is somewhat helpful.  Has decided that she does not want to change medication since she wants to be \"able to feel her emotions and work through them\".  She feels that time off from work has been helpful to concentrate on her therapy and to have less stress, which was also exacerbating her depression and anxiety.        Review of Systems         Objective    /80   Pulse 86   Temp 98.6  F (37  C) (Temporal)   Resp 16   Wt 110.2 kg (243 lb)   SpO2 94%   Breastfeeding Yes   BMI 38.06 kg/m    Body mass index is 38.06 kg/m .  Physical Exam   GENERAL: healthy, alert and no distress  PSYCH: mentation appears normal, affect anxious; PHQ-9 score of 16; ELLIOTT-7 score of 15                "

## 2022-04-20 ASSESSMENT — ANXIETY QUESTIONNAIRES: GAD7 TOTAL SCORE: 15

## 2022-04-22 ENCOUNTER — TELEPHONE (OUTPATIENT)
Dept: FAMILY MEDICINE | Facility: CLINIC | Age: 37
End: 2022-04-22
Payer: COMMERCIAL

## 2022-04-22 NOTE — TELEPHONE ENCOUNTER
Reason for Call:  Form, our goal is to have forms completed with 72 hours, however, some forms may require a visit or additional information.    Type of letter, form or note:  work     Who is the form from?: Gwendolyn (if other please explain)    Where did the form come from: form was faxed in    What clinic location was the form placed at?: [facility]    Where the form was placed: [employer] form folder POD C    What number is listed as a contact on the form?: N/A       Additional comments: N/A    Call taken on 4/22/2022 at 3:49 PM by Ute Stoll

## 2022-05-11 ENCOUNTER — E-VISIT (OUTPATIENT)
Dept: FAMILY MEDICINE | Facility: CLINIC | Age: 37
End: 2022-05-11
Payer: COMMERCIAL

## 2022-05-11 DIAGNOSIS — F33.1 MAJOR DEPRESSIVE DISORDER, RECURRENT EPISODE, MODERATE (H): Primary | ICD-10-CM

## 2022-05-11 PROCEDURE — 99421 OL DIG E/M SVC 5-10 MIN: CPT | Performed by: NURSE PRACTITIONER

## 2022-05-11 ASSESSMENT — ANXIETY QUESTIONNAIRES
GAD7 TOTAL SCORE: 5
1. FEELING NERVOUS, ANXIOUS, OR ON EDGE: SEVERAL DAYS
4. TROUBLE RELAXING: NOT AT ALL
7. FEELING AFRAID AS IF SOMETHING AWFUL MIGHT HAPPEN: SEVERAL DAYS
2. NOT BEING ABLE TO STOP OR CONTROL WORRYING: SEVERAL DAYS
GAD7 TOTAL SCORE: 5
7. FEELING AFRAID AS IF SOMETHING AWFUL MIGHT HAPPEN: SEVERAL DAYS
GAD7 TOTAL SCORE: 5
5. BEING SO RESTLESS THAT IT IS HARD TO SIT STILL: NOT AT ALL
8. IF YOU CHECKED OFF ANY PROBLEMS, HOW DIFFICULT HAVE THESE MADE IT FOR YOU TO DO YOUR WORK, TAKE CARE OF THINGS AT HOME, OR GET ALONG WITH OTHER PEOPLE?: SOMEWHAT DIFFICULT
3. WORRYING TOO MUCH ABOUT DIFFERENT THINGS: SEVERAL DAYS
6. BECOMING EASILY ANNOYED OR IRRITABLE: SEVERAL DAYS

## 2022-05-11 ASSESSMENT — PATIENT HEALTH QUESTIONNAIRE - PHQ9
10. IF YOU CHECKED OFF ANY PROBLEMS, HOW DIFFICULT HAVE THESE PROBLEMS MADE IT FOR YOU TO DO YOUR WORK, TAKE CARE OF THINGS AT HOME, OR GET ALONG WITH OTHER PEOPLE: SOMEWHAT DIFFICULT
SUM OF ALL RESPONSES TO PHQ QUESTIONS 1-9: 5
SUM OF ALL RESPONSES TO PHQ QUESTIONS 1-9: 5

## 2022-05-12 ASSESSMENT — ANXIETY QUESTIONNAIRES: GAD7 TOTAL SCORE: 5

## 2022-05-12 ASSESSMENT — PATIENT HEALTH QUESTIONNAIRE - PHQ9: SUM OF ALL RESPONSES TO PHQ QUESTIONS 1-9: 5

## 2022-06-16 ENCOUNTER — OFFICE VISIT (OUTPATIENT)
Dept: DERMATOLOGY | Facility: CLINIC | Age: 37
End: 2022-06-16
Payer: COMMERCIAL

## 2022-06-16 DIAGNOSIS — L21.9 DERMATITIS, SEBORRHEIC: Primary | ICD-10-CM

## 2022-06-16 DIAGNOSIS — D22.9 MULTIPLE BENIGN NEVI: ICD-10-CM

## 2022-06-16 DIAGNOSIS — L21.9 SEBORRHEIC DERMATITIS: ICD-10-CM

## 2022-06-16 PROCEDURE — 99203 OFFICE O/P NEW LOW 30 MIN: CPT | Performed by: PHYSICIAN ASSISTANT

## 2022-06-16 RX ORDER — KETOCONAZOLE 2 G/100G
AEROSOL, FOAM TOPICAL
Qty: 50 G | Refills: 1 | Status: SHIPPED | OUTPATIENT
Start: 2022-06-16 | End: 2024-04-01

## 2022-06-16 RX ORDER — KETOCONAZOLE 20 MG/ML
SHAMPOO TOPICAL
Qty: 120 ML | Refills: 3 | Status: SHIPPED | OUTPATIENT
Start: 2022-06-16 | End: 2023-11-09

## 2022-06-16 RX ORDER — FLUOCINOLONE ACETONIDE 0.11 MG/ML
OIL TOPICAL AT BEDTIME
Qty: 118.28 ML | Refills: 1 | Status: SHIPPED | OUTPATIENT
Start: 2022-06-16 | End: 2023-03-21

## 2022-06-16 ASSESSMENT — PAIN SCALES - GENERAL: PAINLEVEL: NO PAIN (0)

## 2022-06-16 NOTE — LETTER
6/16/2022       RE: Brent Walters  [address] [place] [address]     Dear Colleague,    Thank you for referring your patient, Brent Walters, to the [facility] [place] at [facility]. Please see a copy of my visit note below.    [facility] Dermatology Note  Encounter Date: Jun 16, 2022  Office Visit     Dermatology Problem List:  1. Seborrheic dermatitis  2. EIC mid back - photo 6/16/22  ____________________________________________    Assessment & Plan:    # Seborrheic dermatitis  - Recommended washing hair more frequently, though this can b drying on the hair  - Start ketoconazole 2% shampoo daily for flares, than 1 time weekly for maintenance  - Start ketoconazole 2% foam daily for flare, interchange with the shampoo and see how it goes  -she may find the foam is more amenable to her hair type than frequent washing. So either option daily is appropriate so long as she can tolerate it, then after it is better controlled she can reduce to less frequent application  - Start Dermasmooth scalp oil at night time for itching    # EIC, mid back, not currently inflamed,   - Consider surgical removal in the future, if patient would like removal referral will be sent to plastics  -photo today    # Inflammatory nodule, left axilla  - Monitor for now as it has only been present about 1 week. If persists longer than 2-3mo , consider bx or follow up potentially with OGYN    Procedures Performed:   None    Follow-up: prn for new or changing lesions    Staff and Scribe:     Scribe Disclosure:  I, Debi Kelley, am serving as a scribe to document services personally performed by Elisabet Belcher PA-C based on data collection and the provider's statements to me.     Provider Disclosure:   The documentation recorded by the scribe accurately reflects the services I personally performed and the decisions made by me.    All risks,  benefits and alternatives were discussed with patient.  Patient is in agreement and understands the assessment and plan.  All questions were answered.    Elisabte Belcher PA-C, MPAS  [facility] [place]: Phone: [phone], Fax: [phone]  [facility]: Phone: [phone],  Fax: [phone]  [facility]: Phone: [phone], Fax: [phone]  ____________________________________________    CC: Skin Check (Brent is here for a skin check and has spots of concern on her back, scalp, face, and L armpit.)    HPI:  Ms. Brent Walters is a(n) 36 year old female who presents today as a new patient for skin exam. Patient reports that she has a few spots of concern on her back, scalp, face, and under left armpit. She says that she has really bad dandruff and washes her hair every 1-2 weeks and takes OTC Nizoral.     Patient is otherwise feeling well, without additional skin concerns.    Labs Reviewed:  N/A    Physical Exam:  Vitals: There were no vitals taken for this visit.  SKIN: Focused examination of scalp, face, back, and armpits was performed.  - Mild erythema and scaling of the scalp  - Mid back there is a 8 mm cystic lesion  - Inflammatory nodule on the left axilla   - Multiple regular brown pigmented macules and papules are identified on the trunk and extremities.   - No other lesions of concern on areas examined.           Medications:  Current Outpatient Medications   Medication     Cholecalciferol (VITAMIN D PO)     escitalopram (LEXAPRO) 10 MG tablet     tiZANidine (ZANAFLEX) 4 MG tablet     No current facility-administered medications for this visit.      Past Medical History:   Patient Active Problem List   Diagnosis     CARDIOVASCULAR SCREENING; LDL GOAL LESS THAN 160     Anxiety     Low grade squamous intraepith lesion on cytologic smear cervix (lgsil)     SAWYER (obstructive sleep apnea)     Major depressive  disorder, recurrent episode, moderate (H)     Generalized anxiety disorder     Gestational diabetes     Spontaneous vaginal delivery     GBS (group B streptococcus) infection     BMI >35     Prediabetes     Past Medical History:   Diagnosis Date     Cervical high risk HPV (human papillomavirus) test positive 08/16/2017 08/16/17,08/14/18, 10/14/20     Depressive disorder     on meds     Diabetes (H)     gestational diabetic     Hyperemesis gravidarum 4/9/2019     LSIL (low grade squamous intraepithelial lesion) on Pap smear 11/2011    colp - QUIQUE I     Spinal headache 10/16/2019

## 2022-06-16 NOTE — PROGRESS NOTES
[facility] Dermatology Note  Encounter Date: Jun 16, 2022  Office Visit     Dermatology Problem List:  1. Seborrheic dermatitis  2. EIC mid back - photo 6/16/22  ____________________________________________    Assessment & Plan:    # Seborrheic dermatitis  - Recommended washing hair more frequently, though this can b drying on the hair  - Start ketoconazole 2% shampoo daily for flares, than 1 time weekly for maintenance  - Start ketoconazole 2% foam daily for flare, interchange with the shampoo and see how it goes  -she may find the foam is more amenable to her hair type than frequent washing. So either option daily is appropriate so long as she can tolerate it, then after it is better controlled she can reduce to less frequent application  - Start Dermasmooth scalp oil at night time for itching    # EIC, mid back, not currently inflamed,   - Consider surgical removal in the future, if patient would like removal referral will be sent to plastics  -photo today    # Inflammatory nodule, left axilla  - Monitor for now as it has only been present about 1 week. If persists longer than 2-3mo , consider bx or follow up potentially with OGYN    Procedures Performed:   None    Follow-up: prn for new or changing lesions    Staff and Scribe:     Scribe Disclosure:  I, Debi Kelley, am serving as a scribe to document services personally performed by Elisabet Belcher PA-C based on data collection and the provider's statements to me.     Provider Disclosure:   The documentation recorded by the scribe accurately reflects the services I personally performed and the decisions made by me.    All risks, benefits and alternatives were discussed with patient.  Patient is in agreement and understands the assessment and plan.  All questions were answered.    Elisabet Belcher PA-C, MPAS  [facility]: Phone: [phone], Fax: [phone]  [facility]  [facility]: Phone: [phone],  Fax: [phone]  [facility]: Phone: [phone], Fax: [phone]  ____________________________________________    CC: Skin Check (Brent is here for a skin check and has spots of concern on her back, scalp, face, and L armpit.)    HPI:  Ms. Brent Walters is a(n) 36 year old female who presents today as a new patient for skin exam. Patient reports that she has a few spots of concern on her back, scalp, face, and under left armpit. She says that she has really bad dandruff and washes her hair every 1-2 weeks and takes OTC Nizoral.     Patient is otherwise feeling well, without additional skin concerns.    Labs Reviewed:  N/A    Physical Exam:  Vitals: There were no vitals taken for this visit.  SKIN: Focused examination of scalp, face, back, and armpits was performed.  - Mild erythema and scaling of the scalp  - Mid back there is a 8 mm cystic lesion  - Inflammatory nodule on the left axilla   - Multiple regular brown pigmented macules and papules are identified on the trunk and extremities.   - No other lesions of concern on areas examined.           Medications:  Current Outpatient Medications   Medication     Cholecalciferol (VITAMIN D PO)     escitalopram (LEXAPRO) 10 MG tablet     tiZANidine (ZANAFLEX) 4 MG tablet     No current facility-administered medications for this visit.      Past Medical History:   Patient Active Problem List   Diagnosis     CARDIOVASCULAR SCREENING; LDL GOAL LESS THAN 160     Anxiety     Low grade squamous intraepith lesion on cytologic smear cervix (lgsil)     SAWYER (obstructive sleep apnea)     Major depressive disorder, recurrent episode, moderate (H)     Generalized anxiety disorder     Gestational diabetes     Spontaneous vaginal delivery     GBS (group B streptococcus) infection     BMI >35     Prediabetes     Past Medical History:   Diagnosis Date     Cervical high risk HPV (human papillomavirus) test positive  08/16/2017 08/16/17,08/14/18, 10/14/20     Depressive disorder     on meds     Diabetes (H)     gestational diabetic     Hyperemesis gravidarum 4/9/2019     LSIL (low grade squamous intraepithelial lesion) on Pap smear 11/2011    colp - QUIQUE I     Spinal headache 10/16/2019

## 2022-06-16 NOTE — NURSING NOTE
Dermatology Rooming Note    Brent Walters's goals for this visit include:   Chief Complaint   Patient presents with     Skin Check     Brent is here for a skin check and has spots of concern on her back, scalp, face, and L armpit.     Cody Hopson, EMT

## 2022-09-20 ENCOUNTER — E-VISIT (OUTPATIENT)
Dept: FAMILY MEDICINE | Facility: CLINIC | Age: 37
End: 2022-09-20
Payer: COMMERCIAL

## 2022-09-20 DIAGNOSIS — R42 DIZZINESS: Primary | ICD-10-CM

## 2022-09-20 PROCEDURE — 99207 PR NON-BILLABLE SERV PER CHARTING: CPT | Performed by: NURSE PRACTITIONER

## 2022-09-20 NOTE — PATIENT INSTRUCTIONS
Thank you for choosing us for your care. Based on the information provided, I believe you need to be seen in person and recommend going to urgent care.   You will not be charged for this eVisit.

## 2022-09-21 ENCOUNTER — OFFICE VISIT (OUTPATIENT)
Dept: URGENT CARE | Facility: URGENT CARE | Age: 37
End: 2022-09-21
Payer: COMMERCIAL

## 2022-09-21 ENCOUNTER — NURSE TRIAGE (OUTPATIENT)
Dept: NURSING | Facility: CLINIC | Age: 37
End: 2022-09-21

## 2022-09-21 VITALS
TEMPERATURE: 98.2 F | HEIGHT: 67 IN | OXYGEN SATURATION: 98 % | HEART RATE: 81 BPM | BODY MASS INDEX: 38.06 KG/M2 | SYSTOLIC BLOOD PRESSURE: 122 MMHG | DIASTOLIC BLOOD PRESSURE: 84 MMHG

## 2022-09-21 DIAGNOSIS — R53.83 FATIGUE, UNSPECIFIED TYPE: ICD-10-CM

## 2022-09-21 DIAGNOSIS — H53.9 VISION CHANGES: Primary | ICD-10-CM

## 2022-09-21 DIAGNOSIS — R42 DIZZINESS: ICD-10-CM

## 2022-09-21 LAB
ALBUMIN SERPL-MCNC: 3.6 G/DL (ref 3.4–5)
ALP SERPL-CCNC: 51 U/L (ref 40–150)
ALT SERPL W P-5'-P-CCNC: 18 U/L (ref 0–50)
ANION GAP SERPL CALCULATED.3IONS-SCNC: 2 MMOL/L (ref 3–14)
AST SERPL W P-5'-P-CCNC: 17 U/L (ref 0–45)
BASOPHILS # BLD AUTO: 0 10E3/UL (ref 0–0.2)
BASOPHILS NFR BLD AUTO: 1 %
BILIRUB SERPL-MCNC: 0.5 MG/DL (ref 0.2–1.3)
BUN SERPL-MCNC: 10 MG/DL (ref 7–30)
CALCIUM SERPL-MCNC: 9.2 MG/DL (ref 8.5–10.1)
CHLORIDE BLD-SCNC: 109 MMOL/L (ref 94–109)
CO2 SERPL-SCNC: 30 MMOL/L (ref 20–32)
CREAT SERPL-MCNC: 0.77 MG/DL (ref 0.52–1.04)
EOSINOPHIL # BLD AUTO: 0.3 10E3/UL (ref 0–0.7)
EOSINOPHIL NFR BLD AUTO: 3 %
ERYTHROCYTE [DISTWIDTH] IN BLOOD BY AUTOMATED COUNT: 12.8 % (ref 10–15)
GFR SERPL CREATININE-BSD FRML MDRD: >90 ML/MIN/1.73M2
GLUCOSE BLD-MCNC: 104 MG/DL (ref 70–99)
HCT VFR BLD AUTO: 41 % (ref 35–47)
HGB BLD-MCNC: 13 G/DL (ref 11.7–15.7)
IMM GRANULOCYTES # BLD: 0 10E3/UL
IMM GRANULOCYTES NFR BLD: 0 %
IRON SATN MFR SERPL: 28 % (ref 15–46)
IRON SERPL-MCNC: 98 UG/DL (ref 35–180)
LYMPHOCYTES # BLD AUTO: 2.8 10E3/UL (ref 0.8–5.3)
LYMPHOCYTES NFR BLD AUTO: 36 %
MCH RBC QN AUTO: 29.1 PG (ref 26.5–33)
MCHC RBC AUTO-ENTMCNC: 31.7 G/DL (ref 31.5–36.5)
MCV RBC AUTO: 92 FL (ref 78–100)
MONOCYTES # BLD AUTO: 0.7 10E3/UL (ref 0–1.3)
MONOCYTES NFR BLD AUTO: 8 %
NEUTROPHILS # BLD AUTO: 4.1 10E3/UL (ref 1.6–8.3)
NEUTROPHILS NFR BLD AUTO: 52 %
PLATELET # BLD AUTO: 288 10E3/UL (ref 150–450)
POTASSIUM BLD-SCNC: 3.9 MMOL/L (ref 3.4–5.3)
PROT SERPL-MCNC: 7.5 G/DL (ref 6.8–8.8)
RBC # BLD AUTO: 4.46 10E6/UL (ref 3.8–5.2)
SODIUM SERPL-SCNC: 141 MMOL/L (ref 133–144)
TIBC SERPL-MCNC: 352 UG/DL (ref 240–430)
TSH SERPL DL<=0.005 MIU/L-ACNC: 1.57 MU/L (ref 0.4–4)
VIT B12 SERPL-MCNC: 496 PG/ML (ref 232–1245)
WBC # BLD AUTO: 7.9 10E3/UL (ref 4–11)

## 2022-09-21 PROCEDURE — 99213 OFFICE O/P EST LOW 20 MIN: CPT | Performed by: PHYSICIAN ASSISTANT

## 2022-09-21 PROCEDURE — 36415 COLL VENOUS BLD VENIPUNCTURE: CPT | Performed by: PHYSICIAN ASSISTANT

## 2022-09-21 PROCEDURE — 82607 VITAMIN B-12: CPT | Performed by: PHYSICIAN ASSISTANT

## 2022-09-21 PROCEDURE — 83550 IRON BINDING TEST: CPT | Performed by: PHYSICIAN ASSISTANT

## 2022-09-21 PROCEDURE — 80050 GENERAL HEALTH PANEL: CPT | Performed by: PHYSICIAN ASSISTANT

## 2022-09-21 RX ORDER — MECLIZINE HYDROCHLORIDE 25 MG/1
25 TABLET ORAL 3 TIMES DAILY PRN
Qty: 30 TABLET | Refills: 0 | Status: SHIPPED | OUTPATIENT
Start: 2022-09-21 | End: 2023-03-21

## 2022-09-21 NOTE — PROGRESS NOTES
Dizziness  - meclizine (ANTIVERT) 25 MG tablet; Take 1 tablet (25 mg) by mouth 3 times daily as needed for dizziness  - CBC with platelets and differential; Future  - Comprehensive metabolic panel (BMP + Alb, Alk Phos, ALT, AST, Total. Bili, TP); Future  - TSH with free T4 reflex; Future  - Vitamin B12; Future  - Iron and iron binding capacity; Future  - CBC with platelets and differential  - Comprehensive metabolic panel (BMP + Alb, Alk Phos, ALT, AST, Total. Bili, TP)  - TSH with free T4 reflex  - Vitamin B12  - Iron and iron binding capacity    Fatigue, unspecified type  - CBC with platelets and differential; Future  - Comprehensive metabolic panel (BMP + Alb, Alk Phos, ALT, AST, Total. Bili, TP); Future  - TSH with free T4 reflex; Future  - Vitamin B12; Future  - Iron and iron binding capacity; Future  - CBC with platelets and differential  - Comprehensive metabolic panel (BMP + Alb, Alk Phos, ALT, AST, Total. Bili, TP)  - TSH with free T4 reflex  - Vitamin B12  - Iron and iron binding capacity    Vision changes  - Adult Eye  Referral; Future    If all labs return normal and the patient continues to have symptoms, I would like her to follow-up with ophthalmology for further work-up.  Patient will also need to follow-up with her primary care provider.  Consider ocular migraines in differential diagnosis.    Corky Bermeo PA-C  [facility]    Subjective   36 year old who presents to clinic today for the following health issues:    Urgent Care       HPI     Last week pt started having vision changes, blurry up close, blotchy further away, this gets better and worse but stays throughout the day and also feeling dizzy, this comes and goes, pt has been feeling out of it, tired, not herself.      Patient states that her eyes feel tired by the end of the day.      Patient states that she just recently had her period prior to onset of her symptoms. She states that she has been having  extreme fatigue after her periods.      Denies body aches, fevers, vertigo, eye pain, chills, headache, cough, congestion, sinus pressure, nausea, vomiting, and  diarrhea.     Review of Systems   Review of Systems   See HPI     Objective    Temp: 98.2  F (36.8  C) Temp src: Temporal BP: 122/84 Pulse: 81     SpO2: 98 %       Physical Exam   Physical Exam  Constitutional:       General: She is not in acute distress.     Appearance: Normal appearance. She is normal weight. She is not ill-appearing, toxic-appearing or diaphoretic.   HENT:      Head: Normocephalic and atraumatic.   Cardiovascular:      Rate and Rhythm: Normal rate.      Pulses: Normal pulses.   Pulmonary:      Effort: Pulmonary effort is normal. No respiratory distress.   Neurological:      Mental Status: She is alert.   Psychiatric:         Mood and Affect: Mood normal.         Behavior: Behavior normal.         Thought Content: Thought content normal.         Judgment: Judgment normal.          Results for orders placed or performed in visit on 09/21/22 (from the past 24 hour(s))   CBC with platelets and differential    Narrative    The following orders were created for panel order CBC with platelets and differential.  Procedure                               Abnormality         Status                     ---------                               -----------         ------                     CBC with platelets and d...[[id number]]                      Final result                 Please view results for these tests on the individual orders.   CBC with platelets and differential   Result Value Ref Range    WBC Count 7.9 4.0 - 11.0 10e3/uL    RBC Count 4.46 3.80 - 5.20 10e6/uL    Hemoglobin 13.0 11.7 - 15.7 g/dL    Hematocrit 41.0 35.0 - 47.0 %    MCV 92 78 - 100 fL    MCH 29.1 26.5 - 33.0 pg    MCHC 31.7 31.5 - 36.5 g/dL    RDW 12.8 10.0 - 15.0 %    Platelet Count 288 150 - 450 10e3/uL    % Neutrophils 52 %    % Lymphocytes 36 %    % Monocytes 8 %    %  Eosinophils 3 %    % Basophils 1 %    % Immature Granulocytes 0 %    Absolute Neutrophils 4.1 1.6 - 8.3 10e3/uL    Absolute Lymphocytes 2.8 0.8 - 5.3 10e3/uL    Absolute Monocytes 0.7 0.0 - 1.3 10e3/uL    Absolute Eosinophils 0.3 0.0 - 0.7 10e3/uL    Absolute Basophils 0.0 0.0 - 0.2 10e3/uL    Absolute Immature Granulocytes 0.0 <=0.4 10e3/uL

## 2022-09-21 NOTE — TELEPHONE ENCOUNTER
Call from valarie Kennedy triage note.  Best number to contact her this afternoon [phone]  Nurse Triage SBAR    Is this a 2nd Level Triage? YES, LICENSED PRACTITIONER REVIEW IS REQUIRED    Situation:   for the past week has been experiencing daily, lasting for hours, blurred vision both eyes.  Denies eye pain, floaters.  Reports does have flashes of light.  No headache, fever.  Blurriness worsens when is reading from electronic device ie: e-mail on phone.  Denies eye pain, drainage, discharge.  No injury.  Reports feels a little dizzy, not faint feeling. Reports brain feels foggy or out of it when vision is blurred.  Does admit to eyes aching when blurriness is present, thinking is from straining.  No light sensitivity    Background:   was seen in urgent care today for vision changes, referred to ophthalmology, to be seen soon    Assessment:   follow up by ophthalmology team this afternoon    Protocol Recommended Disposition:   See in Office Today        Reason for Disposition    Patient wants to be seen    Additional Information    Negative: Weakness of the face, arm or leg on one side of the body    Negative: Followed getting substance in the eye    Negative: Foreign body or object is or was lodged in the eye    Negative: Followed an eye injury    Negative: Followed sun lamp or sun exposure (UV keratitis)    Negative: Yellow or green discharge (pus) in the eye    Negative: Pregnant    Negative: Complete loss of vision in 1 or both eyes    Negative: SEVERE eye pain    Negative: Severe headache    Negative: Double vision    Negative: Blurred vision or visual changes and present now and sudden onset or new (e.g., minutes, hours, days)  (Exception: Seeing floaters / black specks OR previously diagnosed migraine headaches with same symptoms.)    Negative: Patient sounds very sick or weak to the triager    Negative: Flashes of light  (Exception: brief from pressing on the eyeball)    Negative: Eye pain and brief (now  "gone) blurred vision or visual changes    Negative: Taking digoxin (e.g., Lanoxin, Digitek, Cardoxin, Lanoxicaps; Toloxin in [place]) and blurred vision, yellow vision, or yellow-green halos    Answer Assessment - Initial Assessment Questions  1. DESCRIPTION: \"What is the vision loss like? Describe it for me.\" (e.g., complete vision loss, blurred vision, double vision, floaters, etc.)      Blurriness.  Occurs more often with reading from a screen, ie: email on phone  2. LOCATION: \"One or both eyes?\" If one, ask: \"Which eye?\"      Both eyes  3. SEVERITY: \"Can you see anything?\" If Yes, ask: \"What can you see?\" (e.g., fine print)      Is able to see,  Blurriness, comes and goes  4. ONSET: \"When did this begin?\" \"Did it start suddenly or has this been gradual?\"      Last week  5. PATTERN: \"Does this come and go, or has it been constant since it started?\"      Comes and goes.  When present is there for hours. Happens daiy  6. PAIN: \"Is there any pain in your eye(s)?\"  (Scale 1-10; or mild, moderate, severe)      Denies eye pain.  Admits to aching, thinking from eye straning due to bluriness.   7. CONTACTS-GLASSES: \"Do you wear contacts or glasses?\"      No glasses  8. CAUSE: \"What do you think is causing this visual problem?\"      Unsure, has not happened before  9. OTHER SYMPTOMS: \"Do you have any other symptoms?\" (e.g., confusion, headache, arm or leg weakness, speech problems)      Admits to flashes of light, no floaters. Reports a little dizziness, no faint feeling. Blurriness becomes worse with head movement. Reports feels \"brain foggy, or out of it, when vision blurry\". Denies headache, alert and oriented.  Eyes less blurry in the morning, increased blurriness as day goes on.  speech clear, no extremity weakness  10. PREGNANCY: \"Is there any chance you are pregnant?\" \"When was your last menstrual period?\"        N/A    Protocols used: VISION LOSS OR CHANGE-A-OH      "

## 2022-09-21 NOTE — TELEPHONE ENCOUNTER
Spoke to pt at 1630    Pt scheduled [facility] for tomorrow with Dr. Sherman    Pt seen in urgent care for intermittent blurring of vision in past week.    Pt seemed comfortable with scheduling and scheduling seems appropriate    Rodney Resendiz RN 4:34 PM 09/21/22

## 2022-09-22 ENCOUNTER — OFFICE VISIT (OUTPATIENT)
Dept: OPHTHALMOLOGY | Facility: CLINIC | Age: 37
End: 2022-09-22
Attending: PHYSICIAN ASSISTANT
Payer: COMMERCIAL

## 2022-09-22 DIAGNOSIS — H35.713 CENTRAL SEROUS CHORIORETINOPATHY OF BOTH EYES: Primary | ICD-10-CM

## 2022-09-22 DIAGNOSIS — H04.123 BILATERAL DRY EYES: ICD-10-CM

## 2022-09-22 DIAGNOSIS — H53.9 VISION CHANGES: ICD-10-CM

## 2022-09-22 PROCEDURE — G0463 HOSPITAL OUTPT CLINIC VISIT: HCPCS | Mod: 25

## 2022-09-22 PROCEDURE — 99204 OFFICE O/P NEW MOD 45 MIN: CPT | Performed by: OPHTHALMOLOGY

## 2022-09-22 PROCEDURE — 92134 CPTRZ OPH DX IMG PST SGM RTA: CPT | Performed by: OPHTHALMOLOGY

## 2022-09-22 PROCEDURE — 99207 OCT OPTIC NERVE RNFL SPECTRALIS OU (BOTH EYES): CPT | Mod: 26 | Performed by: OPHTHALMOLOGY

## 2022-09-22 PROCEDURE — 92015 DETERMINE REFRACTIVE STATE: CPT

## 2022-09-22 PROCEDURE — 92133 CPTRZD OPH DX IMG PST SGM ON: CPT | Performed by: OPHTHALMOLOGY

## 2022-09-22 ASSESSMENT — REFRACTION_WEARINGRX
OD_AXIS: 164
OD_CYLINDER: +0.75
OD_SPHERE: -0.75
OS_CYLINDER: +0.75
OS_SPHERE: -0.75
OS_AXIS: 054
SPECS_TYPE: SVL

## 2022-09-22 ASSESSMENT — EXTERNAL EXAM - LEFT EYE: OS_EXAM: NORMAL

## 2022-09-22 ASSESSMENT — REFRACTION_MANIFEST
OD_AXIS: 170
OS_CYLINDER: +0.50
OS_SPHERE: -0.75
OS_AXIS: 050
OD_CYLINDER: +0.75
OD_SPHERE: -0.75

## 2022-09-22 ASSESSMENT — VISUAL ACUITY
METHOD: SNELLEN - LINEAR
OD_CC: 20/20
OS_CC: 20/20
CORRECTION_TYPE: GLASSES

## 2022-09-22 ASSESSMENT — TONOMETRY
OS_IOP_MMHG: 22
IOP_METHOD: TONOPEN
OD_IOP_MMHG: 19

## 2022-09-22 ASSESSMENT — CONF VISUAL FIELD
OS_NORMAL: 1
METHOD: COUNTING FINGERS
OD_NORMAL: 1

## 2022-09-22 ASSESSMENT — SLIT LAMP EXAM - LIDS
COMMENTS: NORMAL
COMMENTS: NORMAL

## 2022-09-22 ASSESSMENT — CUP TO DISC RATIO
OD_RATIO: 0.2
OS_RATIO: 0.2

## 2022-09-22 ASSESSMENT — EXTERNAL EXAM - RIGHT EYE: OD_EXAM: NORMAL

## 2022-09-22 NOTE — NURSING NOTE
Chief Complaints and History of Present Illnesses   Patient presents with     Blurred Vision Evaluation     Chief Complaint(s) and History of Present Illness(es)     Blurred Vision Evaluation     Laterality: both eyes    Context: near vision    Associated symptoms: Negative for eye pain, flashes and floaters    Treatments tried: no treatments              Comments     35yo female here for blurry vision. Since 1 week ago, her vision has been fluctuating and not improving. X1 week. She describes it as blurry spots that jumps around - she particularly notices it when on her phone. She does not use any eye drops. Eyes have been feeling fatigue to work. Symptoms are better in the morning and worsens through the day.  She also notes some dizziness that is exacerbated with head movement, and she feels vision may be affecting it. Laying down and closing eyes alleviate dizziness.    Efrain SOUTH 3:05 PM September 22, 2022

## 2022-09-22 NOTE — PROGRESS NOTES
Chief Complaint(s) and History of Present Illness(es)     Blurred Vision Evaluation     Laterality: both eyes    Context: near vision    Associated symptoms: Negative for eye pain, flashes and floaters    Treatments tried: no treatments              Comments     35yo female here for blurry vision. Since 1 week ago, her vision has been   fluctuating and not improving. X1 week. She describes it as blurry spots   that jumps around - she particularly notices it when on her phone. She   does not use any eye drops. Eyes have been feeling fatigue to work.   Symptoms are better in the morning and worsens through the day.  She also notes some dizziness that is exacerbated with head movement, and   she feels vision may be affecting it. Laying down and closing eyes   alleviate dizziness.    Efrain Harper COT 3:05 PM September 22, 2022               Review of systems for the eyes was negative other than the pertinent positives/negatives listed in the HPI.      Assessment & Plan      Brent Walters is a 36 year old female with the following diagnoses:   1. Central serous chorioretinopathy of both eyes    2. Vision changes - Both Eyes    3. Bilateral dry eyes         Discussed new diagnosis of central serous chorioretinopathy   Has not started topical fluocinolone (recommended to avoid), no other reported steroid use  High stress with work/life recently  Reviewed stress management may help with central serous chorioretinopathy   Avoidance of steroid medications going forward  Over the counter readers as needed for now  Reassurance of expectant resolution given  Return precautions reviewed         Patient disposition:   Return for OCT Macula, VT only, Refraction.           Attending Physician Attestation:  Complete documentation of historical and exam elements from today's encounter can be found in the full encounter summary report (not reduplicated in this progress note).  I personally obtained the chief complaint(s) and history of  present illness.  I confirmed and edited as necessary the review of systems, past medical/surgical history, family history, social history, and examination findings as documented by others; and I examined the patient myself.  I personally reviewed the relevant tests, images, and reports as documented above.  I formulated and edited as necessary the assessment and plan and discussed the findings and management plan with the patient and family. .I spent a total of 30 minutes face to face with the patient.  Over 50% of this time was spent counseling and coordinating care regarding their diagnosis and management.    - Noah Sherman MD

## 2022-10-30 ENCOUNTER — HEALTH MAINTENANCE LETTER (OUTPATIENT)
Age: 37
End: 2022-10-30

## 2022-10-31 DIAGNOSIS — H35.713 CENTRAL SEROUS CHORIORETINOPATHY OF BOTH EYES: Primary | ICD-10-CM

## 2022-11-02 ENCOUNTER — OFFICE VISIT (OUTPATIENT)
Dept: OPHTHALMOLOGY | Facility: CLINIC | Age: 37
End: 2022-11-02
Attending: OPHTHALMOLOGY
Payer: COMMERCIAL

## 2022-11-02 DIAGNOSIS — H35.713 CENTRAL SEROUS CHORIORETINOPATHY OF BOTH EYES: ICD-10-CM

## 2022-11-02 PROCEDURE — 99213 OFFICE O/P EST LOW 20 MIN: CPT | Performed by: OPHTHALMOLOGY

## 2022-11-02 PROCEDURE — 92015 DETERMINE REFRACTIVE STATE: CPT

## 2022-11-02 PROCEDURE — 92134 CPTRZ OPH DX IMG PST SGM RTA: CPT | Performed by: OPHTHALMOLOGY

## 2022-11-02 PROCEDURE — G0463 HOSPITAL OUTPT CLINIC VISIT: HCPCS | Mod: 25

## 2022-11-02 ASSESSMENT — CONF VISUAL FIELD
OD_SUPERIOR_TEMPORAL_RESTRICTION: 0
OS_INFERIOR_NASAL_RESTRICTION: 0
OS_SUPERIOR_TEMPORAL_RESTRICTION: 0
METHOD: COUNTING FINGERS
OS_SUPERIOR_NASAL_RESTRICTION: 0
OS_NORMAL: 1
OD_NORMAL: 1
OD_INFERIOR_NASAL_RESTRICTION: 0
OD_SUPERIOR_NASAL_RESTRICTION: 0
OS_INFERIOR_TEMPORAL_RESTRICTION: 0
OD_INFERIOR_TEMPORAL_RESTRICTION: 0

## 2022-11-02 ASSESSMENT — REFRACTION_MANIFEST
OD_CYLINDER: +1.00
OS_SPHERE: -0.25
OS_CYLINDER: SPHERE
OS_ADD: +0.00
OD_AXIS: 166
OD_ADD: +0.00
OD_SPHERE: -1.00

## 2022-11-02 ASSESSMENT — REFRACTION_WEARINGRX
OD_CYLINDER: +0.75
OS_AXIS: 054
OD_SPHERE: -0.75
OD_AXIS: 164
SPECS_TYPE: SVL
OS_CYLINDER: +0.75
OS_SPHERE: -0.75

## 2022-11-02 ASSESSMENT — VISUAL ACUITY
OD_SC: 20/25
OS_SC: 20/20
OD_SC+: -2
METHOD: SNELLEN - LINEAR

## 2022-11-02 ASSESSMENT — EXTERNAL EXAM - RIGHT EYE: OD_EXAM: NORMAL

## 2022-11-02 ASSESSMENT — TONOMETRY
OS_IOP_MMHG: 20
OD_IOP_MMHG: 19
IOP_METHOD: TONOPEN

## 2022-11-02 ASSESSMENT — SLIT LAMP EXAM - LIDS
COMMENTS: NORMAL
COMMENTS: NORMAL

## 2022-11-02 ASSESSMENT — EXTERNAL EXAM - LEFT EYE: OS_EXAM: NORMAL

## 2022-11-02 NOTE — PROGRESS NOTES
Chief Complaint(s) and History of Present Illness(es)     Central Serous Retinopathy Follow Up            Laterality: both eyes          Comments    Pt states her vision is stable since last visit.  Pt has light spots and blurriness in each eye. Some days are worse than   others, possible from being tired.  Pt has blinking lights in the front of her vision. The flashes are   different throughout the day.   No pain in each eye.  Pt does get headaches from straining her eyes.  No red, dry or extra tearing.    SANDRA TREVINO COA November 2, 2022 3:13 PM                Review of systems for the eyes was negative other than the pertinent positives/negatives listed in the HPI.      Assessment & Plan      Brent Walters is a 36 year old female with the following diagnoses:   1. Central serous chorioretinopathy of both eyes         Vision stable, resolving subretinal fluid right eye > left eye   Avoidance of steroid medications going forward  Over the counter readers as needed for now  Reassurance of continued expectant resolution   Return precautions reviewed         Patient disposition:   Return in about 6 weeks (around 12/14/2022) for VT only, Refraction, OCT Macula.           Attending Physician Attestation:  Complete documentation of historical and exam elements from today's encounter can be found in the full encounter summary report (not reduplicated in this progress note).  I personally obtained the chief complaint(s) and history of present illness.  I confirmed and edited as necessary the review of systems, past medical/surgical history, family history, social history, and examination findings as documented by others; and I examined the patient myself.  I personally reviewed the relevant tests, images, and reports as documented above.  I formulated and edited as necessary the assessment and plan and discussed the findings and management plan with the patient and family. . - Noah Sherman MD

## 2022-11-02 NOTE — NURSING NOTE
Chief Complaints and History of Present Illnesses   Patient presents with     Central Serous Retinopathy Follow Up     Chief Complaint(s) and History of Present Illness(es)     Central Serous Retinopathy Follow Up            Laterality: both eyes          Comments    Pt states her vision is stable since last visit.  Pt has light spots and blurriness in each eye. Some days are worse than others, possible from being tired.  Pt has blinking lights in the front of her vision. The flashes are different throughout the day.   No pain in each eye.  Pt does get headaches from straining her eyes.  No red, dry or extra tearing.    SANDRA TREVINO COA November 2, 2022 3:13 PM

## 2023-01-30 ENCOUNTER — PATIENT OUTREACH (OUTPATIENT)
Dept: OBGYN | Facility: CLINIC | Age: 38
End: 2023-01-30
Payer: COMMERCIAL

## 2023-01-30 DIAGNOSIS — R87.612 LOW GRADE SQUAMOUS INTRAEPITH LESION ON CYTOLOGIC SMEAR CERVIX (LGSIL): Primary | ICD-10-CM

## 2023-03-21 ENCOUNTER — OFFICE VISIT (OUTPATIENT)
Dept: FAMILY MEDICINE | Facility: CLINIC | Age: 38
End: 2023-03-21
Payer: COMMERCIAL

## 2023-03-21 VITALS
TEMPERATURE: 98 F | DIASTOLIC BLOOD PRESSURE: 80 MMHG | HEIGHT: 67 IN | SYSTOLIC BLOOD PRESSURE: 112 MMHG | RESPIRATION RATE: 16 BRPM | WEIGHT: 237 LBS | OXYGEN SATURATION: 96 % | BODY MASS INDEX: 37.2 KG/M2 | HEART RATE: 60 BPM

## 2023-03-21 DIAGNOSIS — Z12.4 CERVICAL CANCER SCREENING: ICD-10-CM

## 2023-03-21 DIAGNOSIS — B37.31 YEAST INFECTION OF THE VAGINA: ICD-10-CM

## 2023-03-21 DIAGNOSIS — R53.83 OTHER FATIGUE: ICD-10-CM

## 2023-03-21 DIAGNOSIS — F33.1 MAJOR DEPRESSIVE DISORDER, RECURRENT EPISODE, MODERATE (H): ICD-10-CM

## 2023-03-21 DIAGNOSIS — M54.50 BILATERAL LOW BACK PAIN WITHOUT SCIATICA, UNSPECIFIED CHRONICITY: ICD-10-CM

## 2023-03-21 DIAGNOSIS — F41.9 ANXIETY: ICD-10-CM

## 2023-03-21 DIAGNOSIS — L60.0 INGROWN TOENAIL: ICD-10-CM

## 2023-03-21 DIAGNOSIS — R73.09 ELEVATED GLUCOSE: ICD-10-CM

## 2023-03-21 DIAGNOSIS — E66.01 MORBID OBESITY (H): ICD-10-CM

## 2023-03-21 DIAGNOSIS — Z00.00 ROUTINE GENERAL MEDICAL EXAMINATION AT A HEALTH CARE FACILITY: Primary | ICD-10-CM

## 2023-03-21 LAB — HBA1C MFR BLD: 6 % (ref 0–5.6)

## 2023-03-21 PROCEDURE — G0145 SCR C/V CYTO,THINLAYER,RESCR: HCPCS | Performed by: NURSE PRACTITIONER

## 2023-03-21 PROCEDURE — 87624 HPV HI-RISK TYP POOLED RSLT: CPT | Performed by: NURSE PRACTITIONER

## 2023-03-21 PROCEDURE — 0124A COVID-19 VACCINE BIVALENT BOOSTER 12+ (PFIZER): CPT | Performed by: NURSE PRACTITIONER

## 2023-03-21 PROCEDURE — 82306 VITAMIN D 25 HYDROXY: CPT | Performed by: NURSE PRACTITIONER

## 2023-03-21 PROCEDURE — 96127 BRIEF EMOTIONAL/BEHAV ASSMT: CPT | Performed by: NURSE PRACTITIONER

## 2023-03-21 PROCEDURE — 99214 OFFICE O/P EST MOD 30 MIN: CPT | Mod: 25 | Performed by: NURSE PRACTITIONER

## 2023-03-21 PROCEDURE — 83036 HEMOGLOBIN GLYCOSYLATED A1C: CPT | Performed by: NURSE PRACTITIONER

## 2023-03-21 PROCEDURE — 91312 COVID-19 VACCINE BIVALENT BOOSTER 12+ (PFIZER): CPT | Performed by: NURSE PRACTITIONER

## 2023-03-21 PROCEDURE — 99395 PREV VISIT EST AGE 18-39: CPT | Mod: 25 | Performed by: NURSE PRACTITIONER

## 2023-03-21 PROCEDURE — 36415 COLL VENOUS BLD VENIPUNCTURE: CPT | Performed by: NURSE PRACTITIONER

## 2023-03-21 RX ORDER — FLUCONAZOLE 150 MG/1
150 TABLET ORAL ONCE
Qty: 1 TABLET | Refills: 1 | Status: SHIPPED | OUTPATIENT
Start: 2023-03-21 | End: 2023-03-21

## 2023-03-21 RX ORDER — ESCITALOPRAM OXALATE 10 MG/1
10 TABLET ORAL DAILY
Qty: 90 TABLET | Refills: 3 | Status: SHIPPED | OUTPATIENT
Start: 2023-03-21 | End: 2024-03-31

## 2023-03-21 RX ORDER — CEPHALEXIN 500 MG/1
500 CAPSULE ORAL 2 TIMES DAILY
Qty: 20 CAPSULE | Refills: 0 | Status: SHIPPED | OUTPATIENT
Start: 2023-03-21 | End: 2023-03-31

## 2023-03-21 ASSESSMENT — PATIENT HEALTH QUESTIONNAIRE - PHQ9
SUM OF ALL RESPONSES TO PHQ QUESTIONS 1-9: 8
SUM OF ALL RESPONSES TO PHQ QUESTIONS 1-9: 8
10. IF YOU CHECKED OFF ANY PROBLEMS, HOW DIFFICULT HAVE THESE PROBLEMS MADE IT FOR YOU TO DO YOUR WORK, TAKE CARE OF THINGS AT HOME, OR GET ALONG WITH OTHER PEOPLE: SOMEWHAT DIFFICULT

## 2023-03-21 ASSESSMENT — PAIN SCALES - GENERAL: PAINLEVEL: MODERATE PAIN (4)

## 2023-03-21 NOTE — PROGRESS NOTES
SUBJECTIVE:   CC: Brent is an 37 year old who presents for preventive health visit.       History of Present Illness       Reason for visit:  Pap smear    She eats 2-3 servings of fruits and vegetables daily.She consumes 0 sweetened beverage(s) daily.She exercises with enough effort to increase her heart rate 9 or less minutes per day.  She exercises with enough effort to increase her heart rate 3 or less days per week.   She is taking medications regularly.    Today's PHQ-9         PHQ-9 Total Score: 8    PHQ-9 Q9 Thoughts of better off dead/self-harm past 2 weeks :   Not at all    How difficult have these problems made it for you to do your work, take care of things at home, or get along with other people: Somewhat difficult              Today's PHQ-2 Score:   PHQ-2 ( 1999 [employer]) 4/19/2022   Q1: Little interest or pleasure in doing things -   Q2: Feeling down, depressed or hopeless -   PHQ-2 Score -   PHQ-2 Total Score (12-17 Years)- Positive if 3 or more points; Administer PHQ-A if positive -   Q1: Little interest or pleasure in doing things -   Q2: Feeling down, depressed or hopeless -   PHQ-2 Score Incomplete           Social History     Tobacco Use     Smoking status: Never     Smokeless tobacco: Never     Tobacco comments:     none   Substance Use Topics     Alcohol use: Yes     Comment: Occasionally         Alcohol Use 1/9/2022   Prescreen: >3 drinks/day or >7 drinks/week? No       Reviewed orders with patient.  Reviewed health maintenance and updated orders accordingly - Yes      Breast Cancer Screening:    Breast CA Risk Assessment (FHS-7) 1/9/2022   Do you have a family history of breast, colon, or ovarian cancer? No / Unknown           Pertinent mammograms are reviewed under the imaging tab.    History of abnormal Pap smear: YES - updated in Problem List and Health Maintenance accordingly  PAP / HPV Latest Ref Rng & Units 1/10/2022 10/14/2020 8/14/2018   PAP   Negative for Intraepithelial Lesion or  "Malignancy (NILM) - -   PAP (Historical) - - NIL NIL   HPV16 Negative Negative Negative Negative   HPV18 Negative Negative Negative Negative   HRHPV Negative Positive(A) Positive(A) Positive(A)     Reviewed and updated as needed this visit by clinical staff   Tobacco  Allergies  Meds              Reviewed and updated as needed this visit by Provider                 She has been feeling more fatigued lately.  Her mood is good.  She is taking 5000 international units of vitamin D.  Not always getting enough sleep.    Ingrown toenail right great toe.  Pain, redness, some drainage.  She has been using neosporin and soaking in Epsom salts.  She has had difficulties with ingrown toenails for several years.     Review of Systems       OBJECTIVE:   /80 (BP Location: Right arm, Patient Position: Sitting, Cuff Size: Adult Large)   Pulse 60   Temp 98  F (36.7  C) (Temporal)   Resp 16   Ht 1.702 m (5' 7\")   Wt 107.5 kg (237 lb)   LMP 03/11/2023 (Exact Date)   SpO2 96%   Breastfeeding No   BMI 37.12 kg/m    Physical Exam  GENERAL: healthy, alert and no distress  EYES: Eyes grossly normal to inspection, PERRL and conjunctivae and sclerae normal  HENT: ear canals and TM's normal, nose and mouth without ulcers or lesions  NECK: no adenopathy, no asymmetry, masses, or scars and thyroid normal to palpation  RESP: lungs clear to auscultation - no rales, rhonchi or wheezes  CV: regular rate and rhythm, normal S1 S2, no S3 or S4, no murmur, click or rub, no peripheral edema and peripheral pulses strong  ABDOMEN: soft, nontender, no hepatosplenomegaly, no masses and bowel sounds normal  MS: no gross musculoskeletal defects noted, no edema  SKIN: no suspicious lesions or rashes  NEURO: Normal strength and tone, mentation intact and speech normal  PSYCH: mentation appears normal, affect normal/bright        ASSESSMENT/PLAN:   (Z00.00) Routine general medical examination at a health care facility  (primary encounter " "diagnosis)  Comment:   Plan:     (Z12.4) Cervical cancer screening  Comment:   Plan: Pap Screen with HPV - recommended age 30 - 65         years            (R73.09) Elevated glucose  Comment:   Plan: Hemoglobin A1c            (L60.0) Ingrown toenail  Comment:   Plan: Orthopedic  Referral, cephALEXin         (KEFLEX) 500 MG capsule        Will treat with an antibiotic and refer to podiatry.     (R53.83) Other fatigue  Comment:   Plan: Vitamin D Deficiency        She did have several labs done in December, which were normal.  Will check vitamin D.     (B37.31) Yeast infection of the vagina  Comment:   Plan: fluconazole (DIFLUCAN) 150 MG tablet            (F41.9) Anxiety  Comment: well-controlled  Plan: escitalopram (LEXAPRO) 10 MG tablet        The current medical regimen is effective;  continue present plan and medications.     (F33.1) Major depressive disorder, recurrent episode, moderate (H)  Comment: well-controlled  Plan: escitalopram (LEXAPRO) 10 MG tablet        The current medical regimen is effective;  continue present plan and medications.     (M54.50) Bilateral low back pain without sciatica, unspecified chronicity  Comment: stable  Plan: tiZANidine (ZANAFLEX) 4 MG tablet        The current medical regimen is effective;  continue present plan and medications.     (E66.01) Morbid obesity (H)  Comment: losing weight may help with back pain  Plan:           COUNSELING:  Reviewed preventive health counseling, as reflected in patient instructions      BMI:   Estimated body mass index is 37.12 kg/m  as calculated from the following:    Height as of this encounter: 1.702 m (5' 7\").    Weight as of this encounter: 107.5 kg (237 lb).         She reports that she has never smoked. She has never used smokeless tobacco.          Josefina Aquino NP  [facility]  "

## 2023-03-22 LAB — DEPRECATED CALCIDIOL+CALCIFEROL SERPL-MC: 53 UG/L (ref 20–75)

## 2023-03-28 ENCOUNTER — OFFICE VISIT (OUTPATIENT)
Dept: PODIATRY | Facility: CLINIC | Age: 38
End: 2023-03-28
Payer: COMMERCIAL

## 2023-03-28 VITALS — SYSTOLIC BLOOD PRESSURE: 127 MMHG | DIASTOLIC BLOOD PRESSURE: 80 MMHG | HEART RATE: 100 BPM

## 2023-03-28 DIAGNOSIS — L60.0 INGROWN TOENAIL: ICD-10-CM

## 2023-03-28 LAB
HUMAN PAPILLOMA VIRUS 16 DNA: NEGATIVE
HUMAN PAPILLOMA VIRUS 18 DNA: NEGATIVE
HUMAN PAPILLOMA VIRUS FINAL DIAGNOSIS: NORMAL
HUMAN PAPILLOMA VIRUS OTHER HR: NEGATIVE

## 2023-03-28 PROCEDURE — 99203 OFFICE O/P NEW LOW 30 MIN: CPT | Performed by: PODIATRIST

## 2023-03-28 NOTE — PATIENT INSTRUCTIONS
We wish you continued good healing. If you have any questions or concerns, please do not hesitate to contact us at  [phone]    [employer] (secure e-mail communication and access to your chart) to send a message or to make an appointment.    Please remember to call and schedule a follow up appointment if one was recommended at your earliest convenience.     PODIATRY CLINIC HOURS  TELEPHONE NUMBER    Dr. Dereck HILLIARDPSTEVE [facility]        Clinics:  [facility]   [place]  Tuesday 1PM-6PM  [place]  Wednesday 745AM-330PM  [place]  Thursday/Friday 745AM-230PM       [place] APPOINTMENTS  [phone]    [place] APPOINTMENTS  [phone]        If you need a medication refill, please contact us you may need lab work and/or a follow up visit prior to your refill (i.e. Antifungal medications).  If MRI needed please call Imaging at [phone]   HOW DO I GET MY KNEE SCOOTER? Knee scooters can be picked up at ANY Medical Supply stores with your knee scooter Prescription.  OR  Bring your signed prescription to an [facility] showroom.

## 2023-03-28 NOTE — LETTER
3/28/2023         RE: Brent Walters  [address] [place] [address]        Dear Colleague,    Thank you for referring your patient, Brent Walters, to the [facility]. Please see a copy of my visit note below.    Subjective:    Pt is seen today in consult from Josefina Aquino as a new pt referral w/ the c/c of a painful ingrown right and left great nail both borders and medial border left second toe.  This has been problematic for years. positivehistory of drainage from right hallux medial border recently but this is getting better now.. Aggravated by activity and relieved by rest.  Has tried soaking which has not helped.   denies history of trauma to the area.  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  Patient has prediabetes.    ROS:  see above         Allergies   Allergen Reactions     Sulfa Drugs Swelling     Sulfas in eye drops, caused redness, irritation, swelling to eyes  PN: LW Reaction: rash         Current Outpatient Medications   Medication Sig Dispense Refill     cephALEXin (KEFLEX) 500 MG capsule Take 1 capsule (500 mg) by mouth 2 times daily for 10 days 20 capsule 0     Cholecalciferol (VITAMIN D PO)        escitalopram (LEXAPRO) 10 MG tablet Take 1 tablet (10 mg) by mouth daily 90 tablet 3     ketoconazole (EXTINA) 2 % external foam Apply to the scalp 50 g 1     ketoconazole (NIZORAL) 2 % external shampoo Apply topically to the scalp, leave on for 5min prior to rinsing. Use daily for flares, then 1x weekly for maintenance. 120 mL 3     tiZANidine (ZANAFLEX) 4 MG tablet Take 1 tablet (4 mg) by mouth 3 times daily as needed for muscle spasms 30 tablet 12       Patient Active Problem List   Diagnosis     CARDIOVASCULAR SCREENING; LDL GOAL LESS THAN 160     Anxiety     Low grade squamous intraepith lesion on cytologic smear cervix (lgsil)     SAWYER (obstructive sleep apnea)     Major depressive disorder, recurrent episode, moderate (H)     Generalized  anxiety disorder     Gestational diabetes     Normal delivery     GBS (group B streptococcus) infection     BMI >35     Prediabetes     Missed      Melanocytic nevus     Epidermal cyst     Dysplasia of cervix (uteri)       Past Medical History:   Diagnosis Date     Cervical high risk HPV (human papillomavirus) test positive 2017,18, 10/14/20     Depressive disorder     on meds     Diabetes (H)     gestational diabetic     Hyperemesis gravidarum 2019     LSIL (low grade squamous intraepithelial lesion) on Pap smear 2011    colp - QUIQUE I     Spinal headache 10/16/2019       Past Surgical History:   Procedure Laterality Date     ABDOMEN SURGERY      Tummy tuck     BLOOD PATCH N/A 10/16/2019    Procedure: INJECTION, BLOOD PATCH, EPIDURAL;  Surgeon: Darvin Karimi MD;  Location: [facility] OR     BREAST SURGERY      breast reduction      D & C       SURGICAL HISTORY OF -       tummy tuck       Family History   Problem Relation Age of Onset     Hyperlipidemia Mother      Depression Mother      Hypertension Father      Depression Father      Anxiety Disorder Father      Cerebrovascular Disease Maternal Grandmother      Cancer Paternal Grandmother         pancreatic     Other Cancer Paternal Grandmother      Asthma No family hx of      C.A.D. No family hx of      Breast Cancer No family hx of      Cancer - colorectal No family hx of      Diabetes No family hx of      Glaucoma No family hx of      Macular Degeneration No family hx of        Social History     Tobacco Use     Smoking status: Never     Smokeless tobacco: Never     Tobacco comments:     none   Substance Use Topics     Alcohol use: Yes     Comment: Occasionally         Exam:    Vitals: /80   Pulse 100   LMP 2023 (Exact Date)   BMI: There is no height or weight on file to calculate BMI.  Height: Data Unavailable    Constitutional/ general:  Pt is in no apparent distress, appears well-nourished.  Cooperative  with history and physical exam.     Psych:  The patient answered questions appropriately.  Normal affect.  Seems to have reasonable expectations, in terms of treatment.     Lungs:  Non labored breathing, non labored speech. No cough.  No audible wheezing. Even, quiet breathing.       Vascular:  positive pedal pulses bilaterally for both the DP and PT arteries.  CFT < 3 sec.  negative ankle edema.  positive pedal hair growth.    Neuro:  Alert and oriented x 3. Coordinated gait.  Light touch sensation is intact     Derm: Normal texture and turgor.  No erythema, ecchymosis, or cyanosis.      Musculoskeletal:    right and left great toe nail both borders and left second toe medial border shows soft tissue impingement with localized erythema.   negative active drainage/purulence at this time.  No sinus tracts.  No nailbed masses or exostosis.  No pain with range of motion of IPJ or MTPJ.  No ascending cellulitis.    ASSESSMENT:    Onychocryptosis with paronychia right and left toes  Discussed etiology and treatment options in detail w/ the pt.  The potential causes and nature of an ingrown toenail were discussed with the patient.  We reviewed the natural history/prognosis of the condition and potential risks if no treatment is provided.      After thorough discussion and answering all questions, the patient elected to have permanent removal of affected nail border.  Risk complications and efficacy discussed with patient.  Will return to clinic in near future for 1 hour appointment to get this done.  Thank you for allowing me participate in the care of this patient.        Dereck Owens DPM, FACFAS          Again, thank you for allowing me to participate in the care of your patient.        Sincerely,        Dereck Owens DPM

## 2023-03-28 NOTE — PROGRESS NOTES
Subjective:    Pt is seen today in consult from Josefina Aquino as a new pt referral w/ the c/c of a painful ingrown right and left great nail both borders and medial border left second toe.  This has been problematic for years. positivehistory of drainage from right hallux medial border recently but this is getting better now.. Aggravated by activity and relieved by rest.  Has tried soaking which has not helped.   denies history of trauma to the area.  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  Patient has prediabetes.    ROS:  see above         Allergies   Allergen Reactions     Sulfa Drugs Swelling     Sulfas in eye drops, caused redness, irritation, swelling to eyes  PN: LW Reaction: rash         Current Outpatient Medications   Medication Sig Dispense Refill     cephALEXin (KEFLEX) 500 MG capsule Take 1 capsule (500 mg) by mouth 2 times daily for 10 days 20 capsule 0     Cholecalciferol (VITAMIN D PO)        escitalopram (LEXAPRO) 10 MG tablet Take 1 tablet (10 mg) by mouth daily 90 tablet 3     ketoconazole (EXTINA) 2 % external foam Apply to the scalp 50 g 1     ketoconazole (NIZORAL) 2 % external shampoo Apply topically to the scalp, leave on for 5min prior to rinsing. Use daily for flares, then 1x weekly for maintenance. 120 mL 3     tiZANidine (ZANAFLEX) 4 MG tablet Take 1 tablet (4 mg) by mouth 3 times daily as needed for muscle spasms 30 tablet 12       Patient Active Problem List   Diagnosis     CARDIOVASCULAR SCREENING; LDL GOAL LESS THAN 160     Anxiety     Low grade squamous intraepith lesion on cytologic smear cervix (lgsil)     SAWYER (obstructive sleep apnea)     Major depressive disorder, recurrent episode, moderate (H)     Generalized anxiety disorder     Gestational diabetes     Normal delivery     GBS (group B streptococcus) infection     BMI >35     Prediabetes     Missed      Melanocytic nevus     Epidermal cyst     Dysplasia of cervix (uteri)       Past Medical  History:   Diagnosis Date     Cervical high risk HPV (human papillomavirus) test positive 08/16/2017 08/16/17,08/14/18, 10/14/20     Depressive disorder     on meds     Diabetes (H)     gestational diabetic     Hyperemesis gravidarum 4/9/2019     LSIL (low grade squamous intraepithelial lesion) on Pap smear 11/2011    colp - QUIQUE I     Spinal headache 10/16/2019       Past Surgical History:   Procedure Laterality Date     ABDOMEN SURGERY      Tummy tuck     BLOOD PATCH N/A 10/16/2019    Procedure: INJECTION, BLOOD PATCH, EPIDURAL;  Surgeon: Darvin Karimi MD;  Location: [facility] OR     BREAST SURGERY      breast reduction 2016     D & C       SURGICAL HISTORY OF -       tummy tuck       Family History   Problem Relation Age of Onset     Hyperlipidemia Mother      Depression Mother      Hypertension Father      Depression Father      Anxiety Disorder Father      Cerebrovascular Disease Maternal Grandmother      Cancer Paternal Grandmother         pancreatic     Other Cancer Paternal Grandmother      Asthma No family hx of      C.A.D. No family hx of      Breast Cancer No family hx of      Cancer - colorectal No family hx of      Diabetes No family hx of      Glaucoma No family hx of      Macular Degeneration No family hx of        Social History     Tobacco Use     Smoking status: Never     Smokeless tobacco: Never     Tobacco comments:     none   Substance Use Topics     Alcohol use: Yes     Comment: Occasionally         Exam:    Vitals: /80   Pulse 100   LMP 03/11/2023 (Exact Date)   BMI: There is no height or weight on file to calculate BMI.  Height: Data Unavailable    Constitutional/ general:  Pt is in no apparent distress, appears well-nourished.  Cooperative with history and physical exam.     Psych:  The patient answered questions appropriately.  Normal affect.  Seems to have reasonable expectations, in terms of treatment.     Lungs:  Non labored breathing, non labored speech. No cough.  No audible  wheezing. Even, quiet breathing.       Vascular:  positive pedal pulses bilaterally for both the DP and PT arteries.  CFT < 3 sec.  negative ankle edema.  positive pedal hair growth.    Neuro:  Alert and oriented x 3. Coordinated gait.  Light touch sensation is intact     Derm: Normal texture and turgor.  No erythema, ecchymosis, or cyanosis.      Musculoskeletal:    right and left great toe nail both borders and left second toe medial border shows soft tissue impingement with localized erythema.   negative active drainage/purulence at this time.  No sinus tracts.  No nailbed masses or exostosis.  No pain with range of motion of IPJ or MTPJ.  No ascending cellulitis.    ASSESSMENT:    Onychocryptosis with paronychia right and left toes  Discussed etiology and treatment options in detail w/ the pt.  The potential causes and nature of an ingrown toenail were discussed with the patient.  We reviewed the natural history/prognosis of the condition and potential risks if no treatment is provided.      After thorough discussion and answering all questions, the patient elected to have permanent removal of affected nail border.  Risk complications and efficacy discussed with patient.  Will return to clinic in near future for 1 hour appointment to get this done.  Thank you for allowing me participate in the care of this patient.        Dereck Owens, PAUL, FACFAS

## 2023-04-03 ENCOUNTER — TELEPHONE (OUTPATIENT)
Dept: PODIATRY | Facility: CLINIC | Age: 38
End: 2023-04-03
Payer: COMMERCIAL

## 2023-04-03 NOTE — TELEPHONE ENCOUNTER
[facility] Call Center    Phone Message    May a detailed message be left on voicemail: no     Reason for Call: Other: Patient called in to schedule Ingrown Toenail Procedure and requested an hour long appt.  When scheduling, our visit type automatically sets the appt length. Should I go in and manually change the appt length?     Action Taken: Other: [id number]    Travel Screening: Not Applicable

## 2023-04-03 NOTE — TELEPHONE ENCOUNTER
"Per LOV note, \"will return to clinic in near future for 1 hour appointment...\"   Adjusted appointment length.    Cinthia SALAS, Specialty RN 4/3/2023 11:57 AM    "

## 2023-05-05 ENCOUNTER — OFFICE VISIT (OUTPATIENT)
Dept: PODIATRY | Facility: CLINIC | Age: 38
End: 2023-05-05
Payer: COMMERCIAL

## 2023-05-05 VITALS — DIASTOLIC BLOOD PRESSURE: 86 MMHG | HEART RATE: 100 BPM | SYSTOLIC BLOOD PRESSURE: 130 MMHG

## 2023-05-05 DIAGNOSIS — L60.0 INGROWN TOENAIL: Primary | ICD-10-CM

## 2023-05-05 PROCEDURE — 11750 EXCISION NAIL&NAIL MATRIX: CPT | Mod: 51 | Performed by: PODIATRIST

## 2023-05-05 NOTE — PATIENT INSTRUCTIONS
We wish you continued good healing. If you have any questions or concerns, please do not hesitate to contact us at  [phone]    [employer] (secure e-mail communication and access to your chart) to send a message or to make an appointment.    Please remember to call and schedule a follow up appointment if one was recommended at your earliest convenience.     PODIATRY CLINIC HOURS  TELEPHONE NUMBER    Dr. Dereck HILLIARDPSTEVE FACFAS        Clinics:  [place]  [facility], AV[place]INA Ram, [place] Cur[place]  Tuesday 1PM-6PM  [place]  Wednesday 745AM-330PM  [place]  Thursday/Friday 745AM-230PM  [place]   1st and 3rd Mondays  845-430 PM   [place] APPOINTMENTS  [phone]    [place] APPOINTMENTS  [phone]    [place] APPOINTMENTS  [phone]        If you need a medication refill, please contact us you may need lab work and/or a follow up visit prior to your refill (i.e. Antifungal medications).  If MRI needed please call Imaging at [phone]   HOW DO I GET MY KNEE SCOOTER? Knee scooters can be picked up at ANY Medical Supply stores with your knee scooter Prescription.  OR  Bring your signed prescription to an [facility] showroom.  Call or bring in your Orthotics order to any Orthotics locations. Or call [phone]         [facility] TOENAIL REMOVAL AFTERCARE ~PERMANENT NAIL REMOVAL    Go directly home and elevate the affected foot for the remainder of the day/evening if possible. Your toe may stay numb anywhere from 2-8 hours.   For pain take Tylenol, ibuprofen or another anti-inflammatory as needed for pain. You may apply ice on top of your foot behind he base of the toes, BUT, NOT ON IT.  That evening of the procedure, or morning after procedure.  you may remove the bandage. Began soaking foot three times a day (10-15 min each time) in lukewarm water with a pinch of salt. Epson or table salt   You may have  to do this  for 6-8 weeks if Phenol was used during the procedure.  After soaks, pat the area dry. Apply antibiotic ointment to the area and cover with a band-aid.  The following day. Find a shoe that is comfortable and minimizes the amount of rubbing on your toe, as this increases pain.  Dress with band-aids until no longer draining. Those that have had the phenol procedure, the toe will drain longer and will look like it is infected because it is a chemical burn.    Watch for any signs and symptoms of infection such as: redness, red streaks going up the foot/leg, swelling, pus or foul odor. Fevers > 101   Please call with questions.    Dr. Dereck Owens D.P.M FAC FAS

## 2023-05-05 NOTE — PROGRESS NOTES
Subjective:    Pt is seen today for ingrown nails both feet.  Patient is here for permanent nail removal.      ROS:  see above       Allergies   Allergen Reactions     Sulfa Antibiotics Swelling     Sulfas in eye drops, caused redness, irritation, swelling to eyes  PN: LW Reaction: rash         Current Outpatient Medications   Medication Sig Dispense Refill     Cholecalciferol (VITAMIN D PO)        escitalopram (LEXAPRO) 10 MG tablet Take 1 tablet (10 mg) by mouth daily 90 tablet 3     ketoconazole (EXTINA) 2 % external foam Apply to the scalp 50 g 1     ketoconazole (NIZORAL) 2 % external shampoo Apply topically to the scalp, leave on for 5min prior to rinsing. Use daily for flares, then 1x weekly for maintenance. 120 mL 3     tiZANidine (ZANAFLEX) 4 MG tablet Take 1 tablet (4 mg) by mouth 3 times daily as needed for muscle spasms 30 tablet 12       Patient Active Problem List   Diagnosis     CARDIOVASCULAR SCREENING; LDL GOAL LESS THAN 160     Anxiety     Low grade squamous intraepith lesion on cytologic smear cervix (lgsil)     SAWYER (obstructive sleep apnea)     Major depressive disorder, recurrent episode, moderate (H)     Generalized anxiety disorder     Gestational diabetes     Normal delivery     GBS (group B streptococcus) infection     BMI >35     Prediabetes     Missed      Melanocytic nevus     Epidermal cyst     Dysplasia of cervix (uteri)       Past Medical History:   Diagnosis Date     Cervical high risk HPV (human papillomavirus) test positive 2017,18, 10/14/20     Depressive disorder     on meds     Diabetes (H)     gestational diabetic     Hyperemesis gravidarum 2019     LSIL (low grade squamous intraepithelial lesion) on Pap smear 2011    colp - QUIQUE I     Spinal headache 10/16/2019       Past Surgical History:   Procedure Laterality Date     ABDOMEN SURGERY      Tummy tuck     BLOOD PATCH N/A 10/16/2019    Procedure: INJECTION, BLOOD PATCH, EPIDURAL;  Surgeon:  Darvin Karimi MD;  Location: [facility] OR     BREAST SURGERY      breast reduction 2016     D & C       SURGICAL HISTORY OF -       tummy tuck       Family History   Problem Relation Age of Onset     Hyperlipidemia Mother      Depression Mother      Hypertension Father      Depression Father      Anxiety Disorder Father      Cerebrovascular Disease Maternal Grandmother      Cancer Paternal Grandmother         pancreatic     Other Cancer Paternal Grandmother      Asthma No family hx of      C.A.D. No family hx of      Breast Cancer No family hx of      Cancer - colorectal No family hx of      Diabetes No family hx of      Glaucoma No family hx of      Macular Degeneration No family hx of        Social History     Tobacco Use     Smoking status: Never     Smokeless tobacco: Never     Tobacco comments:     none   Vaping Use     Vaping status: Never Used   Substance Use Topics     Alcohol use: Yes     Comment: Occasionally         Objective:    /86   Pulse 100   LMP 03/11/2023 (Exact Date)   Pulses are palpable +2/4 DP & PT bilateral.  Sensation to light touch is intact.  No fore foot deformities are noted.  Normal ROM all forefoot joints.  No evidence of deep abscess or infection noted.  Pain on palpation of right and left first toe both borders and left second toe medial border.  Erythema, edema, and some proud tissue noted.  Nail appears to be incurvated on the affected border.     Assessment:  Onychocryptosis with paronychia right and left toes    Plan:  Discussed etiology and treatment options with the pt.  The potential causes and nature of an ingrown toenail were discussed with the patient.  We reviewed the natural history/prognosis of the condition and potential risks if no treatment is provided.       After thorough discussion and answering all questions, the patient elected to have permanent removal of right and left first toe both borders.  Risk complications and efficacy discussed with patient.   Obtained consent, used 3cc of 1% lidocaine plain to block aformentioned toe(s).  Sterile prep, then avulsed right and left first toe both border(s) and left second toe medial border.  No evidence of deep abscess noted.  Phenol was applied times 3 at 30 second intervals with curettage in between and then alcohol rinse.  Pt tolerated procedure well.  Sterile bandage placed, gave wound care instruction.  Return to clinic prn.    Dereck Owens DPM, FACFAS

## 2023-05-05 NOTE — LETTER
2023         RE: Brent Walters   [address] [place] [address]        Dear Colleague,    Thank you for referring your patient, Brent Walters, to the [facility]. Please see a copy of my visit note below.    Subjective:    Pt is seen today for ingrown nails both feet.  Patient is here for permanent nail removal.      ROS:  see above       Allergies   Allergen Reactions     Sulfa Antibiotics Swelling     Sulfas in eye drops, caused redness, irritation, swelling to eyes  PN: LW Reaction: rash         Current Outpatient Medications   Medication Sig Dispense Refill     Cholecalciferol (VITAMIN D PO)        escitalopram (LEXAPRO) 10 MG tablet Take 1 tablet (10 mg) by mouth daily 90 tablet 3     ketoconazole (EXTINA) 2 % external foam Apply to the scalp 50 g 1     ketoconazole (NIZORAL) 2 % external shampoo Apply topically to the scalp, leave on for 5min prior to rinsing. Use daily for flares, then 1x weekly for maintenance. 120 mL 3     tiZANidine (ZANAFLEX) 4 MG tablet Take 1 tablet (4 mg) by mouth 3 times daily as needed for muscle spasms 30 tablet 12       Patient Active Problem List   Diagnosis     CARDIOVASCULAR SCREENING; LDL GOAL LESS THAN 160     Anxiety     Low grade squamous intraepith lesion on cytologic smear cervix (lgsil)     SAWYER (obstructive sleep apnea)     Major depressive disorder, recurrent episode, moderate (H)     Generalized anxiety disorder     Gestational diabetes     Normal delivery     GBS (group B streptococcus) infection     BMI >35     Prediabetes     Missed      Melanocytic nevus     Epidermal cyst     Dysplasia of cervix (uteri)       Past Medical History:   Diagnosis Date     Cervical high risk HPV (human papillomavirus) test positive 2017,18, 10/14/20     Depressive disorder     on meds     Diabetes (H)     gestational diabetic     Hyperemesis gravidarum 2019     LSIL (low grade squamous intraepithelial lesion) on  Pap smear 11/2011    colp - QUIQUE I     Spinal headache 10/16/2019       Past Surgical History:   Procedure Laterality Date     ABDOMEN SURGERY      Tummy tuck     BLOOD PATCH N/A 10/16/2019    Procedure: INJECTION, BLOOD PATCH, EPIDURAL;  Surgeon: Darvin Karimi MD;  Location: [facility] OR     BREAST SURGERY      breast reduction 2016     D & C       SURGICAL HISTORY OF -       tummy tuck       Family History   Problem Relation Age of Onset     Hyperlipidemia Mother      Depression Mother      Hypertension Father      Depression Father      Anxiety Disorder Father      Cerebrovascular Disease Maternal Grandmother      Cancer Paternal Grandmother         pancreatic     Other Cancer Paternal Grandmother      Asthma No family hx of      C.A.D. No family hx of      Breast Cancer No family hx of      Cancer - colorectal No family hx of      Diabetes No family hx of      Glaucoma No family hx of      Macular Degeneration No family hx of        Social History     Tobacco Use     Smoking status: Never     Smokeless tobacco: Never     Tobacco comments:     none   Vaping Use     Vaping status: Never Used   Substance Use Topics     Alcohol use: Yes     Comment: Occasionally         Objective:    /86   Pulse 100   LMP 03/11/2023 (Exact Date)   Pulses are palpable +2/4 DP & PT bilateral.  Sensation to light touch is intact.  No fore foot deformities are noted.  Normal ROM all forefoot joints.  No evidence of deep abscess or infection noted.  Pain on palpation of right and left first toe both borders and left second toe medial border.  Erythema, edema, and some proud tissue noted.  Nail appears to be incurvated on the affected border.     Assessment:  Onychocryptosis with paronychia right and left toes    Plan:  Discussed etiology and treatment options with the pt.  The potential causes and nature of an ingrown toenail were discussed with the patient.  We reviewed the natural history/prognosis of the condition and  potential risks if no treatment is provided.       After thorough discussion and answering all questions, the patient elected to have permanent removal of right and left first toe both borders.  Risk complications and efficacy discussed with patient.  Obtained consent, used 3cc of 1% lidocaine plain to block aformentioned toe(s).  Sterile prep, then avulsed right and left first toe both border(s) and left second toe medial border.  No evidence of deep abscess noted.  Phenol was applied times 3 at 30 second intervals with curettage in between and then alcohol rinse.  Pt tolerated procedure well.  Sterile bandage placed, gave wound care instruction.  Return to clinic prn.    Dereck Owens DPM, FACFAS             Again, thank you for allowing me to participate in the care of your patient.        Sincerely,        Dereck Owens DPM

## 2023-05-10 ENCOUNTER — TELEPHONE (OUTPATIENT)
Dept: PODIATRY | Facility: CLINIC | Age: 38
End: 2023-05-10
Payer: COMMERCIAL

## 2023-05-10 RX ORDER — CEPHALEXIN 500 MG/1
500 CAPSULE ORAL 4 TIMES DAILY
Qty: 28 CAPSULE | Refills: 0 | Status: SHIPPED | OUTPATIENT
Start: 2023-05-10 | End: 2024-01-15

## 2023-05-10 NOTE — TELEPHONE ENCOUNTER
RN called and spoke to patient. Patient reports that right first toe is not looking great. Redness to the first joint. Tip of toe is pretty swollen. Unable to determine if warm to the touch as toe is wrapped and forgot to check. Patient reports she is doing soaks. Discussed that the use of Phenol can cause area to appear infected due to drainage and redness. Will have provider review to determine if abx warranted or not.    Cinthia SALAS, Specialty RN 5/10/2023 12:24 PM

## 2023-05-10 NOTE — TELEPHONE ENCOUNTER
Dereck Owens DPM  You 4 minutes ago (2:07 PM)     JN  Prescription written for Keflex and sent to her pharmacy.  Make sure patient is soaking twice daily to 3 times daily.      RN notified patient. Patient reports she is soaking 2-3 times a day    Cinthia SALAS, Specialty RN 5/10/2023 2:13 PM

## 2023-05-10 NOTE — TELEPHONE ENCOUNTER
[facility] Call Center    Phone Message    May a detailed message be left on voicemail: yes     Reason for Call: Other: Pt is calling for antibiotic, possible right toe infection. Please call regarding this     Action Taken: Other: [facility]    Travel Screening: Not Applicable

## 2023-05-17 ENCOUNTER — TELEPHONE (OUTPATIENT)
Dept: PODIATRY | Facility: CLINIC | Age: 38
End: 2023-05-17
Payer: COMMERCIAL

## 2023-05-17 NOTE — TELEPHONE ENCOUNTER
[facility] Call Center    Phone Message    May a detailed message be left on voicemail: yes     Reason for Call: Other: Patient called today because she has been on antibiotics for 6 days and her infection of the right toe doesn't look any better. Please call patient to advise [phone]     Action Taken: Message routed to:  Adult Clinics: Podiatry p [phone]    Travel Screening: Not Applicable

## 2023-05-18 ENCOUNTER — OFFICE VISIT (OUTPATIENT)
Dept: PODIATRY | Facility: CLINIC | Age: 38
End: 2023-05-18
Payer: COMMERCIAL

## 2023-05-18 DIAGNOSIS — L60.0 INGROWN TOENAIL: Primary | ICD-10-CM

## 2023-05-18 PROCEDURE — 99213 OFFICE O/P EST LOW 20 MIN: CPT | Performed by: PODIATRIST

## 2023-05-18 NOTE — PATIENT INSTRUCTIONS
We wish you continued good healing. If you have any questions or concerns, please do not hesitate to contact us at  [phone]    [employer] (secure e-mail communication and access to your chart) to send a message or to make an appointment.    Please remember to call and schedule a follow up appointment if one was recommended at your earliest convenience.     PODIATRY CLINIC HOURS  TELEPHONE NUMBER    Dr. Dereck HILLIARDPSTEVE [facility]        Clinics:  Geovany Currie  [facility], [facility], [facility]  Tuesday 1PM-6PM  [facility]  Wednesday 745AM-330PM  [place]  Thursday/Friday 745AM-230PM  [place]   1st and 3rd Mondays  845-430 PM   [place] APPOINTMENTS  [phone]    [place] APPOINTMENTS  [phone]    [place] APPOINTMENTS  [phone]        If you need a medication refill, please contact us you may need lab work and/or a follow up visit prior to your refill (i.e. Antifungal medications).  If MRI needed please call Imaging at [phone]   HOW DO I GET MY KNEE SCOOTER? Knee scooters can be picked up at ANY Medical Supply stores with your knee scooter Prescription.  OR  Bring your signed prescription to an [facility] showroom.  Call or bring in your Orthotics order to any Orthotics locations. Or call [phone]

## 2023-05-18 NOTE — TELEPHONE ENCOUNTER
Per Dr. Owens-  With chemical burn and permanent nail removal they typically are red swollen and draining and look infected.  Make sure patient is soaking this at least twice a day preferably 3 times a day.  I have openings in my clinic today and tomorrow with patient would like me to look at this.

## 2023-05-18 NOTE — PROGRESS NOTES
Subjective:    Pt is 13 days post opp phenol & alcohol right and left first toe both borders and left second toe medial border permanent removal.  Patient has been soaking daily.  Has some erythema, edema, and drainage on the right hallux and she is concerned about this.  There is minimal on the left hallux her left second toe.  Patient concerned as this is not healing up as fast..  Pain slowly getting better.  Patient was on Keflex recently.       ROS:  Denies fever and chills, purulence or odor    Objective:    LMP 03/11/2023 (Exact Date) .  Patient very pleasant to talk with and in no distress.  Pulses are palpable +2/4 DP & PT bilateral.  CRT < 3 seconds all digits.  Sensation to light touch is intact.  No pain with ROM.   No evidence of deep abscess or infection noted.  Left hallux borders and second toe dry and healing.  There is no erythema.  Right hallux both borders base of wound mixture of fibrotic and granulation tissue.  Only slight pain on palpation no signs indicating cellulitis.  There is no purulence or odor.    Assessment:  Post opp 13 ddays right and left first toe s/p P&A with right still draining and swollen    Plan:  Discussed with the patient that it is possible right hallux had larger ingrown nails and had used more phenol.  Not uncommon to do bilateral procedures and have 1 heal slower.  Reassured there is no signs of infection.  Patient will give this time to heal.  Continue soaking aggressively until healed.  They have been taking dressing off at home to let this dry up.  Return to clinic prn.    Dereck Owens DPM, FACFAS

## 2023-05-18 NOTE — TELEPHONE ENCOUNTER
Called [facility](left mess to call) pt needs to Follow-up in clinic   we have openings this week    Genny Ram CMA

## 2023-05-18 NOTE — LETTER
5/18/2023         RE: Brent Walters  [address]  [place] [address]        Dear Colleague,    Thank you for referring your patient, Brent Walters, to the [facility]. Please see a copy of my visit note below.    Subjective:    Pt is 13 days post opp phenol & alcohol right and left first toe both borders and left second toe medial border permanent removal.  Patient has been soaking daily.  Has some erythema, edema, and drainage on the right hallux and she is concerned about this.  There is minimal on the left hallux her left second toe.  Patient concerned as this is not healing up as fast..  Pain slowly getting better.  Patient was on Keflex recently.       ROS:  Denies fever and chills, purulence or odor    Objective:    LMP 03/11/2023 (Exact Date) .  Patient very pleasant to talk with and in no distress.  Pulses are palpable +2/4 DP & PT bilateral.  CRT < 3 seconds all digits.  Sensation to light touch is intact.  No pain with ROM.   No evidence of deep abscess or infection noted.  Left hallux borders and second toe dry and healing.  There is no erythema.  Right hallux both borders base of wound mixture of fibrotic and granulation tissue.  Only slight pain on palpation no signs indicating cellulitis.  There is no purulence or odor.    Assessment:  Post opp 13 ddays right and left first toe s/p P&A with right still draining and swollen    Plan:  Discussed with the patient that it is possible right hallux had larger ingrown nails and had used more phenol.  Not uncommon to do bilateral procedures and have 1 heal slower.  Reassured there is no signs of infection.  Patient will give this time to heal.  Continue soaking aggressively until healed.  They have been taking dressing off at home to let this dry up.  Return to clinic prn.    Dereck Owens DPM, FACFAS            Again, thank you for allowing me to participate in the care of your patient.        Sincerely,        Dereck BARTHOLOMEW  PAUL Owens

## 2023-09-19 ENCOUNTER — E-VISIT (OUTPATIENT)
Dept: FAMILY MEDICINE | Facility: CLINIC | Age: 38
End: 2023-09-19
Payer: COMMERCIAL

## 2023-09-19 DIAGNOSIS — H00.019 HORDEOLUM EXTERNUM, UNSPECIFIED LATERALITY: Primary | ICD-10-CM

## 2023-09-19 PROCEDURE — 99421 OL DIG E/M SVC 5-10 MIN: CPT | Performed by: NURSE PRACTITIONER

## 2023-09-19 RX ORDER — ERYTHROMYCIN 5 MG/G
0.5 OINTMENT OPHTHALMIC AT BEDTIME
Qty: 1 G | Refills: 0 | Status: SHIPPED | OUTPATIENT
Start: 2023-09-19 | End: 2023-09-26

## 2023-10-14 VITALS
HEART RATE: 61 BPM | TEMPERATURE: 97.1 F | DIASTOLIC BLOOD PRESSURE: 83 MMHG | SYSTOLIC BLOOD PRESSURE: 132 MMHG | BODY MASS INDEX: 37.12 KG/M2 | HEIGHT: 67 IN | OXYGEN SATURATION: 97 % | RESPIRATION RATE: 18 BRPM

## 2023-10-14 DIAGNOSIS — L21.9 DERMATITIS, SEBORRHEIC: ICD-10-CM

## 2023-10-14 PROCEDURE — 99283 EMERGENCY DEPT VISIT LOW MDM: CPT

## 2023-10-15 ENCOUNTER — APPOINTMENT (OUTPATIENT)
Dept: RADIOLOGY | Facility: CLINIC | Age: 38
End: 2023-10-15
Attending: EMERGENCY MEDICINE
Payer: COMMERCIAL

## 2023-10-15 ENCOUNTER — HOSPITAL ENCOUNTER (EMERGENCY)
Facility: CLINIC | Age: 38
Discharge: HOME | End: 2023-10-15
Attending: EMERGENCY MEDICINE | Admitting: EMERGENCY MEDICINE
Payer: COMMERCIAL

## 2023-10-15 DIAGNOSIS — S86.912A STRAIN OF LEFT KNEE, INITIAL ENCOUNTER: ICD-10-CM

## 2023-10-15 DIAGNOSIS — M25.562 LEFT KNEE PAIN, UNSPECIFIED CHRONICITY: ICD-10-CM

## 2023-10-15 PROCEDURE — 73562 X-RAY EXAM OF KNEE 3: CPT | Mod: LT

## 2023-10-15 RX ORDER — OXYCODONE HYDROCHLORIDE 5 MG/1
5 TABLET ORAL EVERY 6 HOURS PRN
Qty: 6 TABLET | Refills: 0 | Status: SHIPPED | OUTPATIENT
Start: 2023-10-15 | End: 2023-10-18

## 2023-10-15 NOTE — ED TRIAGE NOTES
Left knee pain for a week, maybe from working out, twisted knee tonight, made pain worse.  No notable injury. CMS intact, pain is less when knee is straight.     Triage Assessment (Adult)       Row Name 10/14/23 7501          Triage Assessment    Airway WDL WDL        Respiratory WDL    Respiratory WDL WDL        Skin Circulation/Temperature WDL    Skin Circulation/Temperature WDL WDL        Cardiac WDL    Cardiac WDL WDL        Peripheral/Neurovascular WDL    Peripheral Neurovascular WDL WDL        Cognitive/Neuro/Behavioral WDL    Cognitive/Neuro/Behavioral WDL WDL

## 2023-10-15 NOTE — DISCHARGE INSTRUCTIONS
You were seen in the emergency department today for knee pain. Your x-ray did not show any broken bones but I do think you probably need some more advanced imaging to get a better look at your meniscus and ligaments.  It would be best that this be done by the [facility] team so that the correct scans are ordered.     To help with pain:  - Ice the injury for 20 minutes, 3-5 times per day (or up to every 2 hours as needed) for the first 24-72 hours. You can either use an ice pack or plastic bag filled with ice, cover either one with a damp cloth to prevent the cold from burning your skin.   - Wear the knee immobilizer by and use the crutches.  Continue to rest and elevate your leg is much as possible.  - You may take 650-1000mg of Acetaminophen (Tylenol).  Please do not use more than 3000 mg in a 24 hour period. Tylenol is an effective drug when taken at the prescribed dosages but can cause bodily injury including liver damage if taken too often or at too high of dose.  - You can take 600mg of Ibuprofen (Motrin, Advil) by mouth with food every 6-8 hours (no more than 3200mg in 24hrs).    - You can take one or the other every 3 hours while awake (such that each is taken every 6 hours). For example, if you take Tylenol when you get home then you would take ibuprofen 3 hours later followed by another Tylenol dose 3 hours after that. Write down the times you are taking both medications to ensure appropriate time in between doses.  - If Tylenol and ibuprofen are not helping, you can take 5 mg of oxycodone.  Be aware that this will make you sleepy so I would take it at night and do not take it if you plan to work or drive.    Please return to the Emergency Department if you have uncontrolled/worsening pain, difficulty breathing, numbness, inability to keep food/fluids down, or any other new or concerning symptoms. Otherwise please follow up with [facility] for recheck.    Included is some information that you might find  informative and useful.    Thank you for choosing [facility]. It was a pleasure taking care of you today!  - Dr. Joyce Yarbrough

## 2023-10-15 NOTE — ED PROVIDER NOTES
EMERGENCY DEPARTMENT ENCOUNTER      NAME: Brent Walters  YOB: 1985  MRN: [id number]    FINAL IMPRESSION  1. Left knee pain, unspecified chronicity    2. Strain of left knee, initial encounter        MEDICAL DECISION MAKING   Pertinent Labs & Imaging studies reviewed. (See chart for details)    Brent Walters is a 37 year old female who presents for evaluation of left knee pain that has been ongoing and intermittent for 2 weeks but worse over the last few days.  Patient believes she likely injured her knee when she was exercising, although denies obvious injury/fall.  She states that the discomfort is worse when she bends her knee or twists.  She has had mild associated swelling and stiffness and intermittently, numbness and tingling to her left foot.  She has tried using rest, ice, elevation, ibuprofen, and an Ace wrap but has not had much improvement so decided to come in for evaluation.    Considered a broad differential including but not limited to fracture, dislocation, ligamentous or tendinous injury, meniscal injury, soft tissue contusion.  An x-ray of the knee was ordered while patient was awaiting evaluation in triage.  I independently reviewed films which showed no evidence of acute fracture, dislocation, or other bony injury.  On exam, patient had mild diffuse tenderness palpation about the knee and pain with posterior ligamentous testing but no clear instability.  She also endorsed some discomfort with meniscal testing.  There is no overlying erythema or warmth to suggest infectious etiology.  No calf swelling or tenderness to suggest DVT.  Patient is able to ambulate, although with an antalgic gait.  At this point, I do believe history and exam is most consistent with ligamentous or meniscal injury and feel she would benefit most from follow-up with [facility] for further evaluation and potentially, more advanced testing.  In the meantime, we agreed on plan to place in a knee  immobilizer and give a dose of p.o. analgesic.  I did offer crutches but patient felt more comfortable with the immobilizer alone.      Patient was fitted with a knee immobilizer and given a dose of p.o. analgesic with significant improvement in symptoms.  She was able to ambulate without crutches and felt steady on her feet.  She was comfortable with plan for discharge and follow-up with [facility].    Strict return precautions and follow up recommendations were discussed and all questions were answered. Patient endorsed understanding and was in agreement with plan.    I independently reviewed XR of knee (as noted above), formal radiologist read pending.      Medical Decision Making    History:  Supplemental history from: Documented in chart, if applicable  External Record(s) reviewed: Documented in chart, if applicable.    Work Up:  Chart documentation includes differential considered and any EKGs or imaging independently interpreted by provider, where specified.  In additional to work up documented, I considered the following work up: Documented in chart, if applicable.    External consultation:  Discussion of management with another provider: Documented in chart, if applicable    Complicating factors:  Care impacted by chronic illness: Mental Health  Care affected by social determinants of health: Access to Medical Care    Disposition considerations: Discharge. I prescribed additional prescription strength medication(s) as charted. See documentation for any additional details.          ED COURSE  1:04 AM Due to a shortage of available emergency department rooms, with the patient's permission I met with the patient for the initial interview and physical examination in a triage room. Discussed plan for treatment and workup in the ED.     1:54 AM I discussed the plan for discharge with the patient, and patient is agreeable. We discussed supportive cares at home and reasons for return to the ER including new or  "worsening symptoms - all questions and concerns addressed. Patient to be discharged by RN.     MEDICATIONS GIVEN IN THE ED  Medications - No data to display    NEW PRESCRIPTIONS STARTED AT TODAY'S VISIT  Discharge Medication List as of 10/15/2023  2:05 AM        START taking these medications    Details   oxyCODONE (ROXICODONE) 5 MG tablet Take 1 tablet (5 mg) by mouth every 6 hours as needed for severe pain, Disp-6 tablet, R-0, Local Print                =================================================================    Chief Complaint   Patient presents with    Knee Pain         HPI:    Patient information was obtained from: the patient     Use of : N/A     Brent Walters is a 37 year old female with a history of anxiety who presents to the ED via walk in for evaluation of knee pain.    The patient reports the onset of intermittent left knee pain two weeks ago which has worsened over the last few days. A provoking factor is bending the knee or using stairs. She denies any injury or fall to the knee, and she believes that she may have hurt it while exercising. She endorses pain with rotating her knee, mild swelling and knee stiffness, as well as mild numbness and tingling to her left foot. She has taken ibuprofen, iced the area, and used an ace wrap. The patient denies fever, and any other symptoms or complaints at this time.     RELEVANT HISTORY, MEDICATIONS, & ALLERGIES   Past medical history, surgical history, family history, medications, and allergies reviewed and pertinent noted in HPI.    REVIEW OF SYSTEMS:  A complete review of systems was performed with pertinent positives and negatives noted in the HPI. All other systems negative.     PHYSICAL EXAM:    Vitals: /83   Pulse 61   Temp 97.1  F (36.2  C) (Temporal)   Resp 18   Ht 1.702 m (5' 7\")   LMP 10/02/2023 (Exact Date)   SpO2 97%   BMI 37.12 kg/m    General: Awake, alert, interactive.   Eyes: PERRL.   HENT: Atraumatic. MMM. "   Neck: Full AROM.  Cardiovascular: Regular rate.  Respiratory/Chest: Normal work of breathing.   Abdomen: Non-distended.   Musculoskeletal: Normal appearing upper and right lower extremities without obvious deformities or signs of trauma.   Left lower extremity: Diffuse tenderness palpation about knee.  Pain with posterior drawer  and meniscal testing.  No clear instability.  No overlying erythema, warmth.  No calf swelling or tenderness.  2+ DP pulse.  Sensation intact all distributions.  Skin: Normal color. No rash or diaphoresis.  Neurologic: Alert, oriented. Speech clear. CN's grossly intact. Moving all extremities spontaneously.   Psychiatric: Normal affect/mood.       RADIOLOGY  XR Knee Left 3 Views   Final Result   IMPRESSION: Normal left knee joint spaces and alignment. No fracture or joint effusion.            I, Joyce Henning, am serving as a scribe to document services personally performed by Dr. Joyce Yarbrough based on my observation and the provider's statements to me. IJoyce MD attest that Joyce Henning is acting in a scribe capacity, has observed my performance of the services and has documented them in accordance with my direction.    Joyce Yarbrough M.D.  Emergency Medicine  [facility] EMERGENCY ROOM  [address] [id number] [phone]  Dept: [phone]      Joyce Yarbrough MD  10/16/23 2873

## 2023-10-16 ENCOUNTER — TRANSFERRED RECORDS (OUTPATIENT)
Dept: HEALTH INFORMATION MANAGEMENT | Facility: CLINIC | Age: 38
End: 2023-10-16
Payer: COMMERCIAL

## 2023-10-17 RX ORDER — KETOCONAZOLE 20 MG/ML
SHAMPOO TOPICAL
Qty: 120 ML | OUTPATIENT
Start: 2023-10-17

## 2023-10-17 NOTE — TELEPHONE ENCOUNTER
ketoconazole (NIZORAL) 2 % external shampoo   120 mL 3 6/16/2022     Last Office Visit : 6-  Future Office visit:  none    No coumadin seen on med list  Process 1

## 2023-10-30 RX ORDER — KETOCONAZOLE 20 MG/ML
SHAMPOO TOPICAL
Qty: 120 ML | Refills: 3 | OUTPATIENT
Start: 2023-10-30

## 2023-11-08 DIAGNOSIS — L21.9 DERMATITIS, SEBORRHEIC: ICD-10-CM

## 2023-11-10 NOTE — TELEPHONE ENCOUNTER
ketoconazole (NIZORAL) 2 % external shampoo   Last Written Prescription Date:   6/16/2022  Last Fill Quantity: 120,   # refills: 3  Last Office Visit : 6/16/2022  Future Office visit:  None    Routing refill request to provider for review/approval because:  Second request  Pt needs updated office visit  Scheduling/Provider notified    Flores Mcdonald RN  Central Triage Red Flags/Med Refills

## 2023-11-14 RX ORDER — KETOCONAZOLE 20 MG/ML
SHAMPOO TOPICAL
Qty: 120 ML | Refills: 1 | Status: SHIPPED | OUTPATIENT
Start: 2023-11-14

## 2023-12-04 ENCOUNTER — TELEPHONE (OUTPATIENT)
Dept: DERMATOLOGY | Facility: CLINIC | Age: 38
End: 2023-12-04
Payer: COMMERCIAL

## 2023-12-04 NOTE — TELEPHONE ENCOUNTER
Unable to lvm sent my chart msg for patient to schedule he following:    Appointment type: Return  Provider: Elisabet Belcher  Return date: First Available  Specialty phone number: [phone]    
rolling walker/fair balance

## 2023-12-21 ENCOUNTER — OFFICE VISIT (OUTPATIENT)
Dept: DERMATOLOGY | Facility: CLINIC | Age: 38
End: 2023-12-21
Payer: COMMERCIAL

## 2023-12-21 DIAGNOSIS — L91.8 SKIN TAG: ICD-10-CM

## 2023-12-21 DIAGNOSIS — L81.4 LENTIGINES: ICD-10-CM

## 2023-12-21 DIAGNOSIS — D22.9 MULTIPLE BENIGN NEVI: Primary | ICD-10-CM

## 2023-12-21 DIAGNOSIS — Z12.83 ENCOUNTER FOR SCREENING FOR MALIGNANT NEOPLASM OF SKIN: ICD-10-CM

## 2023-12-21 DIAGNOSIS — L72.0 EPIDERMAL CYST: ICD-10-CM

## 2023-12-21 DIAGNOSIS — D18.01 CHERRY ANGIOMA: ICD-10-CM

## 2023-12-21 PROCEDURE — 99213 OFFICE O/P EST LOW 20 MIN: CPT | Performed by: NURSE PRACTITIONER

## 2023-12-21 NOTE — PROGRESS NOTES
[facility] Dermatology Note  Encounter Date: Dec 21, 2023  Office Visit     Reviewed patients past medical history and pertinent chart review prior to patients visit today.     Dermatology Problem List:  Patient denies personal history of skin cancer or dysplastic nevi.    Thinks her mother may have had skin cancer    ____________________________________________    Assessment & Plan:     # Cyst. Benign, no further treatment needed. Discussed excision if patient is bothered by the lesion due to irritation. Patient declines referral for removal    # Benign skin findings including:skin tags, cherry angioma, lentigines and benign nevi.   - No further intervention required. Patient to report changes.   - Patient reassured of the benign nature of these lesions.    #Signs and Symptoms of non-melanoma skin cancer and ABCDEs of melanoma reviewed with patient. Patient encouraged to perform monthly self skin exams and educated on how to perform them. UV precautions reviewed with patient. Patient was asked about new or changing moles/lesions on body.     #Reviewed Sunscreen: Apply 20 minutes prior to going outdoors and reapply every two hours, when wet or sweating. We recommend using an SPF 30 or higher, and to use one that is water resistant.       Follow-up:  1-2 years for follow up full body skin exam, prn for new or changing lesions or new concerns    Deborah Alves CNP  Dermatology     ____________________________________________    CC: Derm Problem (Bumps and new moles/Skin growth on back and hair line /No pain )    HPI:  Ms. Brent Walters is a(n) 38 year old female who presents today as a new patient for a full body skin cancer screening. Patient has concerns today about a few newer moles on her face that she says showed up in the last year but has not been growing or symptomatic. She also has some lumps on her back and left buttock. She has a history of cysts that recurred and thinks possibly they are  cysts     Patient is otherwise feeling well, without additional skin concerns.     Physical Exam:  Vitals: There were no vitals taken for this visit.  SKIN: Total skin excluding the genitalia areas was performed. The exam included the head/face, neck, both arms, chest, back, abdomen, both legs, digits, mons pubis, buttock and nails.   -left buttock, small 4 mm white firm subcutaneous moveable papules  -left mid back, about 1 cm subcutaneous moveable papule with adjacent linear scarring  -several 1-2mm red dome shaped symmetric papules scattered on the trunk  -multiple medium brown homogenous flat round macules and raised papules scattered throughout trunk, extremities and face. No worrisome features for malignancy noted on examination.  -scattered tan, homogenous macules scattered on sun exposed areas of trunk, extremities and face  - several pedunculated skin colored to brown papules scattered on the neckline        - No other lesions of concern on areas examined.     Medications:  Current Outpatient Medications   Medication    Cholecalciferol (VITAMIN D PO)    escitalopram (LEXAPRO) 10 MG tablet    ketoconazole (NIZORAL) 2 % external shampoo    tiZANidine (ZANAFLEX) 4 MG tablet    cephALEXin (KEFLEX) 500 MG capsule    ketoconazole (EXTINA) 2 % external foam     No current facility-administered medications for this visit.      Past Medical History:   Patient Active Problem List   Diagnosis    CARDIOVASCULAR SCREENING; LDL GOAL LESS THAN 160    Anxiety    Low grade squamous intraepith lesion on cytologic smear cervix (lgsil)    SAWYER (obstructive sleep apnea)    Major depressive disorder, recurrent episode, moderate (H)    Generalized anxiety disorder    Gestational diabetes    Normal delivery    GBS (group B streptococcus) infection    BMI >35    Prediabetes    Missed     Melanocytic nevus    Epidermal cyst    Dysplasia of cervix (uteri)     Past Medical History:   Diagnosis Date    Cervical high risk HPV  (human papillomavirus) test positive 08/16/2017 08/16/17,08/14/18, 10/14/20    Depressive disorder     on meds    Diabetes (H)     gestational diabetic    Hyperemesis gravidarum 4/9/2019    LSIL (low grade squamous intraepithelial lesion) on Pap smear 11/2011    colp - QUIQUE I    Spinal headache 10/16/2019       CC Referred Self, MD  No address on file on close of this encounter.

## 2023-12-21 NOTE — LETTER
12/21/2023         RE: Brent Walters  [address] [place] [address]        Dear Colleague,    Thank you for referring your patient, Brent Walters, to the [facility]. Please see a copy of my visit note below.    [facility] Dermatology Note  Encounter Date: Dec 21, 2023  Office Visit     Reviewed patients past medical history and pertinent chart review prior to patients visit today.     Dermatology Problem List:  Patient denies personal history of skin cancer or dysplastic nevi.    Thinks her mother may have had skin cancer    ____________________________________________    Assessment & Plan:     # Cyst. Benign, no further treatment needed. Discussed excision if patient is bothered by the lesion due to irritation. Patient declines referral for removal    # Benign skin findings including:skin tags, cherry angioma, lentigines and benign nevi.   - No further intervention required. Patient to report changes.   - Patient reassured of the benign nature of these lesions.    #Signs and Symptoms of non-melanoma skin cancer and ABCDEs of melanoma reviewed with patient. Patient encouraged to perform monthly self skin exams and educated on how to perform them. UV precautions reviewed with patient. Patient was asked about new or changing moles/lesions on body.     #Reviewed Sunscreen: Apply 20 minutes prior to going outdoors and reapply every two hours, when wet or sweating. We recommend using an SPF 30 or higher, and to use one that is water resistant.       Follow-up:  1-2 years for follow up full body skin exam, prn for new or changing lesions or new concerns    Deborah Alves CNP  Dermatology     ____________________________________________    CC: Derm Problem (Bumps and new moles/Skin growth on back and hair line /No pain )    HPI:  Ms. Brent Walters is a(n) 38 year old female who presents today as a new patient for a full body skin cancer screening. Patient has concerns  today about a few newer moles on her face that she says showed up in the last year but has not been growing or symptomatic. She also has some lumps on her back and left buttock. She has a history of cysts that recurred and thinks possibly they are cysts     Patient is otherwise feeling well, without additional skin concerns.     Physical Exam:  Vitals: There were no vitals taken for this visit.  SKIN: Total skin excluding the genitalia areas was performed. The exam included the head/face, neck, both arms, chest, back, abdomen, both legs, digits, mons pubis, buttock and nails.   -left buttock, small 4 mm white firm subcutaneous moveable papules  -left mid back, about 1 cm subcutaneous moveable papule with adjacent linear scarring  -several 1-2mm red dome shaped symmetric papules scattered on the trunk  -multiple medium brown homogenous flat round macules and raised papules scattered throughout trunk, extremities and face. No worrisome features for malignancy noted on examination.  -scattered tan, homogenous macules scattered on sun exposed areas of trunk, extremities and face  - several pedunculated skin colored to brown papules scattered on the neckline        - No other lesions of concern on areas examined.     Medications:  Current Outpatient Medications   Medication     Cholecalciferol (VITAMIN D PO)     escitalopram (LEXAPRO) 10 MG tablet     ketoconazole (NIZORAL) 2 % external shampoo     tiZANidine (ZANAFLEX) 4 MG tablet     cephALEXin (KEFLEX) 500 MG capsule     ketoconazole (EXTINA) 2 % external foam     No current facility-administered medications for this visit.      Past Medical History:   Patient Active Problem List   Diagnosis     CARDIOVASCULAR SCREENING; LDL GOAL LESS THAN 160     Anxiety     Low grade squamous intraepith lesion on cytologic smear cervix (lgsil)     SAWYER (obstructive sleep apnea)     Major depressive disorder, recurrent episode, moderate (H)     Generalized anxiety disorder      Gestational diabetes     Normal delivery     GBS (group B streptococcus) infection     BMI >35     Prediabetes     Missed      Melanocytic nevus     Epidermal cyst     Dysplasia of cervix (uteri)     Past Medical History:   Diagnosis Date     Cervical high risk HPV (human papillomavirus) test positive 2017,18, 10/14/20     Depressive disorder     on meds     Diabetes (H)     gestational diabetic     Hyperemesis gravidarum 2019     LSIL (low grade squamous intraepithelial lesion) on Pap smear 2011    colp - QUIQUE I     Spinal headache 10/16/2019       CC Referred Self, MD  No address on file on close of this encounter.      Again, thank you for allowing me to participate in the care of your patient.        Sincerely,        DAMARIS Leone CNP

## 2024-01-15 ENCOUNTER — E-VISIT (OUTPATIENT)
Dept: FAMILY MEDICINE | Facility: CLINIC | Age: 39
End: 2024-01-15
Payer: COMMERCIAL

## 2024-01-15 DIAGNOSIS — B37.31 YEAST INFECTION OF THE VAGINA: Primary | ICD-10-CM

## 2024-01-15 PROCEDURE — 99421 OL DIG E/M SVC 5-10 MIN: CPT | Performed by: NURSE PRACTITIONER

## 2024-01-15 RX ORDER — FLUCONAZOLE 150 MG/1
150 TABLET ORAL ONCE
Qty: 1 TABLET | Refills: 1 | Status: SHIPPED | OUTPATIENT
Start: 2024-01-15 | End: 2024-01-15

## 2024-02-20 ENCOUNTER — PATIENT OUTREACH (OUTPATIENT)
Dept: CARE COORDINATION | Facility: CLINIC | Age: 39
End: 2024-02-20
Payer: COMMERCIAL

## 2024-02-22 ENCOUNTER — TELEPHONE (OUTPATIENT)
Dept: DERMATOLOGY | Facility: CLINIC | Age: 39
End: 2024-02-22
Payer: COMMERCIAL

## 2024-02-28 ENCOUNTER — TELEPHONE (OUTPATIENT)
Dept: DERMATOLOGY | Facility: CLINIC | Age: 39
End: 2024-02-28
Payer: COMMERCIAL

## 2024-02-28 NOTE — TELEPHONE ENCOUNTER
Left Voicemail (2nd Attempt) informing pt that the following has been cancelled:    Appointment type: Return  Provider: Elisabet Belcher  Return date: 7/19/24  Specialty phone number: [phone]

## 2024-03-05 ENCOUNTER — PATIENT OUTREACH (OUTPATIENT)
Dept: CARE COORDINATION | Facility: CLINIC | Age: 39
End: 2024-03-05
Payer: COMMERCIAL

## 2024-03-28 DIAGNOSIS — F41.9 ANXIETY: ICD-10-CM

## 2024-03-28 DIAGNOSIS — F33.1 MAJOR DEPRESSIVE DISORDER, RECURRENT EPISODE, MODERATE (H): ICD-10-CM

## 2024-03-31 RX ORDER — ESCITALOPRAM OXALATE 10 MG/1
10 TABLET ORAL DAILY
Qty: 90 TABLET | Refills: 0 | Status: SHIPPED | OUTPATIENT
Start: 2024-03-31 | End: 2024-04-01

## 2024-04-01 ENCOUNTER — OFFICE VISIT (OUTPATIENT)
Dept: FAMILY MEDICINE | Facility: CLINIC | Age: 39
End: 2024-04-01
Payer: COMMERCIAL

## 2024-04-01 VITALS
WEIGHT: 207.8 LBS | BODY MASS INDEX: 31.49 KG/M2 | OXYGEN SATURATION: 100 % | DIASTOLIC BLOOD PRESSURE: 80 MMHG | HEART RATE: 74 BPM | SYSTOLIC BLOOD PRESSURE: 130 MMHG | HEIGHT: 68 IN | TEMPERATURE: 97.6 F | RESPIRATION RATE: 21 BRPM

## 2024-04-01 DIAGNOSIS — F33.1 MAJOR DEPRESSIVE DISORDER, RECURRENT EPISODE, MODERATE (H): ICD-10-CM

## 2024-04-01 DIAGNOSIS — Z13.220 SCREENING CHOLESTEROL LEVEL: ICD-10-CM

## 2024-04-01 DIAGNOSIS — R73.03 PREDIABETES: ICD-10-CM

## 2024-04-01 DIAGNOSIS — N62 HYPERTROPHY OF BREAST: ICD-10-CM

## 2024-04-01 DIAGNOSIS — M54.50 BILATERAL LOW BACK PAIN WITHOUT SCIATICA, UNSPECIFIED CHRONICITY: ICD-10-CM

## 2024-04-01 DIAGNOSIS — Z11.3 SCREEN FOR STD (SEXUALLY TRANSMITTED DISEASE): ICD-10-CM

## 2024-04-01 DIAGNOSIS — Z01.818 PREOP GENERAL PHYSICAL EXAM: Primary | ICD-10-CM

## 2024-04-01 DIAGNOSIS — F41.9 ANXIETY: ICD-10-CM

## 2024-04-01 PROBLEM — E66.01 MORBID OBESITY (H): Status: RESOLVED | Noted: 2020-10-14 | Resolved: 2024-04-01

## 2024-04-01 LAB
ANION GAP SERPL CALCULATED.3IONS-SCNC: 8 MMOL/L (ref 7–15)
BUN SERPL-MCNC: 10.8 MG/DL (ref 6–20)
C TRACH DNA SPEC QL NAA+PROBE: NEGATIVE
CALCIUM SERPL-MCNC: 9.3 MG/DL (ref 8.6–10)
CHLORIDE SERPL-SCNC: 104 MMOL/L (ref 98–107)
CHOLEST SERPL-MCNC: 194 MG/DL
CREAT SERPL-MCNC: 0.8 MG/DL (ref 0.51–0.95)
DEPRECATED HCO3 PLAS-SCNC: 25 MMOL/L (ref 22–29)
EGFRCR SERPLBLD CKD-EPI 2021: >90 ML/MIN/1.73M2
FASTING STATUS PATIENT QL REPORTED: NO
GLUCOSE SERPL-MCNC: 108 MG/DL (ref 70–99)
HBA1C MFR BLD: 5.2 % (ref 0–5.6)
HCV AB SERPL QL IA: NONREACTIVE
HDLC SERPL-MCNC: 41 MG/DL
HGB BLD-MCNC: 13.4 G/DL (ref 11.7–15.7)
HIV 1+2 AB+HIV1 P24 AG SERPL QL IA: NONREACTIVE
LDLC SERPL CALC-MCNC: 127 MG/DL
N GONORRHOEA DNA SPEC QL NAA+PROBE: NEGATIVE
NONHDLC SERPL-MCNC: 153 MG/DL
POTASSIUM SERPL-SCNC: 4.1 MMOL/L (ref 3.4–5.3)
SODIUM SERPL-SCNC: 137 MMOL/L (ref 135–145)
T PALLIDUM AB SER QL: NONREACTIVE
TRIGL SERPL-MCNC: 129 MG/DL

## 2024-04-01 PROCEDURE — 36415 COLL VENOUS BLD VENIPUNCTURE: CPT | Performed by: NURSE PRACTITIONER

## 2024-04-01 PROCEDURE — 87591 N.GONORRHOEAE DNA AMP PROB: CPT | Performed by: NURSE PRACTITIONER

## 2024-04-01 PROCEDURE — 99214 OFFICE O/P EST MOD 30 MIN: CPT | Performed by: NURSE PRACTITIONER

## 2024-04-01 PROCEDURE — 80061 LIPID PANEL: CPT | Performed by: NURSE PRACTITIONER

## 2024-04-01 PROCEDURE — 83036 HEMOGLOBIN GLYCOSYLATED A1C: CPT | Performed by: NURSE PRACTITIONER

## 2024-04-01 PROCEDURE — 87389 HIV-1 AG W/HIV-1&-2 AB AG IA: CPT | Performed by: NURSE PRACTITIONER

## 2024-04-01 PROCEDURE — 80048 BASIC METABOLIC PNL TOTAL CA: CPT | Performed by: NURSE PRACTITIONER

## 2024-04-01 PROCEDURE — 87491 CHLMYD TRACH DNA AMP PROBE: CPT | Performed by: NURSE PRACTITIONER

## 2024-04-01 PROCEDURE — 85018 HEMOGLOBIN: CPT | Performed by: NURSE PRACTITIONER

## 2024-04-01 PROCEDURE — 86803 HEPATITIS C AB TEST: CPT | Performed by: NURSE PRACTITIONER

## 2024-04-01 PROCEDURE — 86780 TREPONEMA PALLIDUM: CPT | Performed by: NURSE PRACTITIONER

## 2024-04-01 RX ORDER — ESCITALOPRAM OXALATE 10 MG/1
15 TABLET ORAL DAILY
Qty: 135 TABLET | Refills: 3 | Status: SHIPPED | OUTPATIENT
Start: 2024-04-01

## 2024-04-01 ASSESSMENT — PATIENT HEALTH QUESTIONNAIRE - PHQ9
SUM OF ALL RESPONSES TO PHQ QUESTIONS 1-9: 8
10. IF YOU CHECKED OFF ANY PROBLEMS, HOW DIFFICULT HAVE THESE PROBLEMS MADE IT FOR YOU TO DO YOUR WORK, TAKE CARE OF THINGS AT HOME, OR GET ALONG WITH OTHER PEOPLE: SOMEWHAT DIFFICULT
SUM OF ALL RESPONSES TO PHQ QUESTIONS 1-9: 8

## 2024-04-01 ASSESSMENT — PAIN SCALES - GENERAL: PAINLEVEL: NO PAIN (0)

## 2024-04-01 NOTE — PATIENT INSTRUCTIONS
Preparing for Your Surgery  Getting started  A nurse will call you to review your health history and instructions. They will give you an arrival time based on your scheduled surgery time. Please be ready to share:  Your doctor's clinic name and phone number  Your medical, surgical, and anesthesia history  A list of allergies and sensitivities  A list of medicines, including herbal treatments and over-the-counter drugs  Whether the patient has a legal guardian (ask how to send us the papers in advance)  Please tell us if you're pregnant--or if there's any chance you might be pregnant. Some surgeries may injure a fetus (unborn baby), so they require a pregnancy test. Surgeries that are safe for a fetus don't always need a test, and you can choose whether to have one.   If you have a child who's having surgery, please ask for a copy of Preparing for Your Child's Surgery.    Preparing for surgery  Within 10 to 30 days of surgery: Have a pre-op exam (sometimes called an H&P, or History and Physical). This can be done at a clinic or pre-operative center.  If you're having a , you may not need this exam. Talk to your care team.  At your pre-op exam, talk to your care team about all medicines you take. If you need to stop any medicines before surgery, ask when to start taking them again.  We do this for your safety. Many medicines can make you bleed too much during surgery. Some change how well surgery (anesthesia) drugs work.  Call your insurance company to let them know you're having surgery. (If you don't have insurance, call [phone].)  Call your clinic if there's any change in your health. This includes signs of a cold or flu (sore throat, runny nose, cough, rash, fever). It also includes a scrape or scratch near the surgery site.  If you have questions on the day of surgery, call your hospital or surgery center.  Eating and drinking guidelines  For your safety: Unless your surgeon tells you otherwise,  follow the guidelines below.  Eat and drink as usual until 8 hours before you arrive for surgery. After that, no food or milk.  Drink clear liquids until 2 hours before you arrive. These are liquids you can see through, like water, Gatorade, and Propel Water. They also include plain black coffee and tea (no cream or milk), candy, and breath mints. You can spit out gum when you arrive.  If you drink alcohol: Stop drinking it the night before surgery.  If your care team tells you to take medicine on the morning of surgery, it's okay to take it with a sip of water.  Preventing infection  Shower or bathe the night before and morning of your surgery. Follow the instructions your clinic gave you. (If no instructions, use regular soap.)  Don't shave or clip hair near your surgery site. We'll remove the hair if needed.  Don't smoke or vape the morning of surgery. You may chew nicotine gum up to 2 hours before surgery. A nicotine patch is okay.  Note: Some surgeries require you to completely quit smoking and nicotine. Check with your surgeon.  Your care team will make every effort to keep you safe from infection. We will:  Clean our hands often with soap and water (or an alcohol-based hand rub).  Clean the skin at your surgery site with a special soap that kills germs.  Give you a special gown to keep you warm. (Cold raises the risk of infection.)  Wear special hair covers, masks, gowns and gloves during surgery.  Give antibiotic medicine, if prescribed. Not all surgeries need antibiotics.  What to bring on the day of surgery  Photo ID and insurance card  Copy of your health care directive, if you have one  Glasses and hearing aids (bring cases)  You can't wear contacts during surgery  Inhaler and eye drops, if you use them (tell us about these when you arrive)  CPAP machine or breathing device, if you use them  A few personal items, if spending the night  If you have . . .  A pacemaker, ICD (cardiac defibrillator) or other  implant: Bring the ID card.  An implanted stimulator: Bring the remote control.  A legal guardian: Bring a copy of the certified (court-stamped) guardianship papers.  Please remove any jewelry, including body piercings. Leave jewelry and other valuables at home.  If you're going home the day of surgery  You must have a responsible adult drive you home. They should stay with you overnight as well.  If you don't have someone to stay with you, and you aren't safe to go home alone, we may keep you overnight. Insurance often won't pay for this.  After surgery  If it's hard to control your pain or you need more pain medicine, please call your surgeon's office.  Questions?   If you have any questions for your care team, list them here: _________________________________________________________________________________________________________________________________________________________________________ ____________________________________ ____________________________________ ____________________________________  For informational purposes only. Not to replace the advice of your health care provider. Copyright   2003, 2019 [facility]. All rights reserved. Clinically reviewed by Violet Roca MD. [employer] 477550 - REV 12/22.    How to Take Your Medication Before Surgery  - Take all of your medications before surgery as usual

## 2024-04-01 NOTE — PROGRESS NOTES
Answers submitted by the patient for this visit:  Patient Health Questionnaire (Submitted on 4/1/2024)  If you checked off any problems, how difficult have these problems made it for you to do your work, take care of things at home, or get along with other people?: Somewhat difficult  PHQ9 TOTAL SCORE: 8  Preoperative Evaluation  [address]  [place] [address]  Phone: [phone]  Fax: [phone]  Primary Provider: Leela Aquino  Pre-op Performing Provider: LEELA AQUINO  Apr 1, 2024       Brent is a 38 year old, presenting for the following:  Pre-Op Exam (/DOS: [id number]/Breast Reduction)        4/1/2024    10:44 AM   Additional Questions   Roomed by RAUL Balbuena   Accompanied by Self         4/1/2024    10:44 AM   Patient Reported Additional Medications   Patient reports taking the following new medications NA     Surgical Information  Surgery/Procedure: Breast Reduction revision, tummy tuck  Surgery Location: [facility]  Surgeon:   Surgery Date: 04192024  Time of Surgery: 0730 am  Where patient plans to recover: At home with family  Fax number for surgical facility: [phone]    Assessment & Plan     The proposed surgical procedure is considered INTERMEDIATE risk.    (Z01.818) Preop general physical exam  (primary encounter diagnosis)  Comment:   Plan: Hemoglobin            (N62) Hypertrophy of breast  Comment:   Plan:     (F41.9) Anxiety  Comment: in partial remission  Plan: escitalopram (LEXAPRO) 10 MG tablet        Will try to increase the dose to 15 mg, as she did have too much drowsiness on 20 mg.  Will follow up at her return visit later this month.     (F33.1) Major depressive disorder, recurrent episode, moderate (H)  Comment: stable  Plan: escitalopram (LEXAPRO) 10 MG tablet        The current medical regimen is effective;  continue present plan and medications.  See above.     (M54.50) Bilateral low back pain  without sciatica, unspecified chronicity  Comment: stable  Plan: tiZANidine (ZANAFLEX) 4 MG tablet        The current medical regimen is effective;  continue present plan and medications.  See above.     (Z11.3) Screen for STD (sexually transmitted disease)  Comment:   Plan: HIV Antigen Antibody Combo, Treponema Abs w         Reflex to RPR and Titer, Hepatitis C Screen         Reflex to HCV RNA Quant and Genotype, NEISSERIA        GONORRHOEA PCR, CHLAMYDIA TRACHOMATIS PCR            (Z13.220) Screening cholesterol level  Comment:   Plan: Lipid panel reflex to direct LDL Non-fasting            (R73.03) Prediabetes  Comment:   Plan: Hemoglobin A1c, Basic metabolic panel  (Ca, Cl,        CO2, Creat, Gluc, K, Na, BUN)                    - No identified additional risk factors other than previously addressed    Antiplatelet or Anticoagulation Medication Instructions   - Patient is on no antiplatelet or anticoagulation medications.    Additional Medication Instructions  Patient is to take all scheduled medications on the day of surgery    Recommendation  APPROVAL GIVEN to proceed with proposed procedure, without further diagnostic evaluation.          Subjective       HPI related to upcoming procedure: Desires cosmetic surgery.        3/25/2024    12:12 PM   Preop Questions   1. Have you ever had a heart attack or stroke? No   2. Have you ever had surgery on your heart or blood vessels, such as a stent placement, a coronary artery bypass, or surgery on an artery in your head, neck, heart, or legs? No   3. Do you have chest pain with activity? No   4. Do you have a history of  heart failure? No   5. Do you currently have a cold, bronchitis or symptoms of other infection? No   6. Do you have a cough, shortness of breath, or wheezing? No   7. Do you or anyone in your family have previous history of blood clots? No   8. Do you or does anyone in your family have a serious bleeding problem such as prolonged bleeding following  surgeries or cuts? No   9. Have you ever had problems with anemia or been told to take iron pills? No   10. Have you had any abnormal blood loss such as black, tarry or bloody stools, or abnormal vaginal bleeding? No   11. Have you ever had a blood transfusion? No   12. Are you willing to have a blood transfusion if it is medically needed before, during, or after your surgery? Yes   13. Have you or any of your relatives ever had problems with anesthesia? No   14. Do you have sleep apnea, excessive snoring or daytime drowsiness? UNKNOWN - sleep apnea in past before she lost weight   15. Do you have any artifical heart valves or other implanted medical devices like a pacemaker, defibrillator, or continuous glucose monitor? No   16. Do you have artificial joints? No   17. Are you allergic to latex? No   18. Is there any chance that you may be pregnant? No       Health Care Directive  Patient does not have a Health Care Directive or Living Will: Discussed advance care planning with patient; information given to patient to review.    Preoperative Review of    reviewed - no current controlled medications          Patient Active Problem List    Diagnosis Date Noted    Prediabetes 01/10/2022     Priority: Medium    GBS (group B streptococcus) infection 10/24/2019     Priority: Medium     Final urine culture report on 10/24/19 shows <10,000 colonies/mL Streptococcus agalactiae sero group B.  Patient was treated in ED on 10/22/19 with Rx of Amoxicillin      Gestational diabetes 10/12/2019     Priority: Medium    Major depressive disorder, recurrent episode, moderate (H) 07/08/2019     Priority: Medium    Generalized anxiety disorder 07/08/2019     Priority: Medium    SAWYER (obstructive sleep apnea) 08/14/2018     Priority: Medium    Low grade squamous intraepith lesion on cytologic smear cervix (lgsil) 11/10/2011     Priority: Medium     02/25/2000: LSIL Pap  10/04/2000: NIL Pap  10/05/2000: ECC Neg for  dysplasia  01: NIL Pap  10/10/03: NIL Pap  [id number]: NIL Pap  05: ECC benign  All above results in care everywhere  11/10/11: LSIL. Plan colp.  11: [facility] - QUIQUE I. Plan pap in 6 & 12 months.  12: NIL pap. Plan pap in 6 months.  2013 Pap ASCUS +HPV 34 (probable HR)Per Dr. Ramos. Not one of high risk 14 HPVs so will have pap in 3 yrs.  16: NIL pap. Plan cotest in 1 year per provider.  17: NIL pap, + HR HPV (not 16 or 18) result. Plan cotest in 1 year per provider.   18: NIL pap, + HR HPV (not 16 or 18) result. Plan [facility].   18: [facility] Bx neg for dysplasia. Plan cotest in 1 year.   19 Patient is lost to pap tracking follow-up  10/14/20: NIL Pap, + HR HPV (not 16 or 18). Plan cotest in 1 year due 10/14/21.   1/10/22 NIL pap, + HR HPV (not 16 or 18). Plan: colp by 4/10/22  2/15/22 [facility]ada- Negative. Plan 1 yr co-test / MyChart read by pt  3/21/23 NIL pap, Neg HPV. Plan 3 yr co-test      Melanocytic nevus 2011     Priority: Medium    Epidermal cyst 2011     Priority: Medium    CARDIOVASCULAR SCREENING; LDL GOAL LESS THAN 160 2011     Priority: Medium    Anxiety 2011     Priority: Medium    Missed  2009     Priority: Medium     Formatting of this note might be different from the original.  LW Modifier:  7 weeks, suction D&C  LW Onset:  12/3/09  ;  Missed      Normal delivery 11/10/2009     Priority: Medium     Formatting of this note might be different from the original.  LW Modifier:  2000- mild shoulder dystocia  ; Normal Spontaneous Vaginal Delivery      Dysplasia of cervix (uteri) 2003     Priority: Medium     Formatting of this note might be different from the original.  QUIQUE Squamous Intraepith Lesion Cervix        Past Medical History:   Diagnosis Date    Cervical high risk HPV (human papillomavirus) test positive 2017,18, 10/14/20    Depressive disorder     on meds    Diabetes (H)      gestational diabetic    Hyperemesis gravidarum 4/9/2019    LSIL (low grade squamous intraepithelial lesion) on Pap smear 11/2011    colp - QUIQUE I    Spinal headache 10/16/2019     Past Surgical History:   Procedure Laterality Date    ABDOMEN SURGERY      Tummy tuck    BLOOD PATCH N/A 10/16/2019    Procedure: INJECTION, BLOOD PATCH, EPIDURAL;  Surgeon: Darvin Karimi MD;  Location: [facility] OR    BREAST SURGERY      breast reduction 2016    D & C      SURGICAL HISTORY OF -       tummy karen     Current Outpatient Medications   Medication Sig Dispense Refill    Cholecalciferol (VITAMIN D PO)       escitalopram (LEXAPRO) 10 MG tablet Take 1.5 tablets (15 mg) by mouth daily 135 tablet 3    ketoconazole (NIZORAL) 2 % external shampoo Apply topically to the scalp, leave on for 5min prior to rinsing. Use daily for flares, then 1x weekly for maintenance. 120 mL 1    tiZANidine (ZANAFLEX) 4 MG tablet Take 1 tablet (4 mg) by mouth 3 times daily as needed for muscle spasms 30 tablet 12       Allergies   Allergen Reactions    Sulfa Antibiotics Swelling     Sulfas in eye drops, caused redness, irritation, swelling to eyes  PN: LW Reaction: rash          Social History     Tobacco Use    Smoking status: Never    Smokeless tobacco: Never    Tobacco comments:     none   Substance Use Topics    Alcohol use: Yes     Comment: Occasionally       History   Drug Use No     Comment: none         Review of Systems    Review of Systems  CONSTITUTIONAL: NEGATIVE for fever, chills, change in weight  INTEGUMENTARY/SKIN: NEGATIVE for worrisome rashes, moles or lesions  EYES: NEGATIVE for vision changes or irritation  ENT/MOUTH: NEGATIVE for ear, mouth and throat problems  RESP: NEGATIVE for significant cough or SOB  BREAST: NEGATIVE for masses, tenderness or discharge  CV: NEGATIVE for chest pain, palpitations or peripheral edema  GI: NEGATIVE for nausea, abdominal pain, heartburn, or change in bowel habits  : NEGATIVE for frequency, dysuria,  "or hematuria  MUSCULOSKELETAL: NEGATIVE for significant arthralgias or myalgia  NEURO: NEGATIVE for weakness, dizziness or paresthesias  ENDOCRINE: NEGATIVE for temperature intolerance, skin/hair changes  HEME: NEGATIVE for bleeding problems  PSYCHIATRIC: NEGATIVE for changes in mood or affect    Objective    /80 (BP Location: Right arm, Patient Position: Sitting, Cuff Size: Adult Regular)   Pulse 74   Temp 97.6  F (36.4  C) (Temporal)   Resp 21   Ht 1.715 m (5' 7.5\")   Wt 94.3 kg (207 lb 12.8 oz)   LMP 03/03/2024 (Exact Date)   SpO2 100%   BMI 32.07 kg/m     Estimated body mass index is 32.07 kg/m  as calculated from the following:    Height as of this encounter: 1.715 m (5' 7.5\").    Weight as of this encounter: 94.3 kg (207 lb 12.8 oz).  Physical Exam  GENERAL: alert and no distress  EYES: Eyes grossly normal to inspection, PERRL and conjunctivae and sclerae normal  HENT: ear canals and TM's normal, nose and mouth without ulcers or lesions  NECK: no adenopathy, no asymmetry, masses, or scars  RESP: lungs clear to auscultation - no rales, rhonchi or wheezes  CV: regular rate and rhythm, normal S1 S2, no S3 or S4, no murmur, click or rub, no peripheral edema  ABDOMEN: soft, nontender, no hepatosplenomegaly, no masses and bowel sounds normal  MS: no gross musculoskeletal defects noted, no edema  SKIN: no suspicious lesions or rashes  NEURO: Normal strength and tone, mentation intact and speech normal  PSYCH: mentation appears normal, affect normal/bright    Recent Labs   Lab Test 03/21/23  1610 09/21/22  1327   HGB  --  13.0   PLT  --  288   NA  --  141   POTASSIUM  --  3.9   CR  --  0.77   A1C 6.0*  --         Diagnostics  Recent Results (from the past 24 hour(s))   Hemoglobin    Collection Time: 04/01/24 11:28 AM   Result Value Ref Range    Hemoglobin 13.4 11.7 - 15.7 g/dL   Hemoglobin A1c    Collection Time: 04/01/24 11:28 AM   Result Value Ref Range    Hemoglobin A1C 5.2 0.0 - 5.6 %      No EKG " required, no history of coronary heart disease, significant arrhythmia, peripheral arterial disease or other structural heart disease.    Revised Cardiac Risk Index (RCRI)  The patient has the following serious cardiovascular risks for perioperative complications:   - No serious cardiac risks = 0 points     RCRI Interpretation: 0 points: Class I (very low risk - 0.4% complication rate)         Signed Electronically by: Josefina Aquino NP  Copy of this evaluation report is provided to requesting physician.

## 2024-04-19 ENCOUNTER — TRANSFERRED RECORDS (OUTPATIENT)
Dept: HEALTH INFORMATION MANAGEMENT | Facility: CLINIC | Age: 39
End: 2024-04-19
Payer: COMMERCIAL

## 2024-04-20 ENCOUNTER — APPOINTMENT (OUTPATIENT)
Dept: CT IMAGING | Facility: HOSPITAL | Age: 39
End: 2024-04-20
Attending: EMERGENCY MEDICINE
Payer: COMMERCIAL

## 2024-04-20 ENCOUNTER — HOSPITAL ENCOUNTER (EMERGENCY)
Facility: HOSPITAL | Age: 39
Discharge: HOME | End: 2024-04-20
Attending: EMERGENCY MEDICINE | Admitting: EMERGENCY MEDICINE
Payer: COMMERCIAL

## 2024-04-20 VITALS
HEIGHT: 67 IN | RESPIRATION RATE: 28 BRPM | OXYGEN SATURATION: 95 % | HEART RATE: 80 BPM | TEMPERATURE: 98.5 F | SYSTOLIC BLOOD PRESSURE: 103 MMHG | DIASTOLIC BLOOD PRESSURE: 58 MMHG | WEIGHT: 200 LBS | BODY MASS INDEX: 31.39 KG/M2

## 2024-04-20 DIAGNOSIS — R55 SYNCOPE, UNSPECIFIED SYNCOPE TYPE: ICD-10-CM

## 2024-04-20 LAB
ALBUMIN SERPL BCG-MCNC: 3.6 G/DL (ref 3.5–5.2)
ALP SERPL-CCNC: 38 U/L (ref 40–150)
ALT SERPL W P-5'-P-CCNC: 6 U/L (ref 0–50)
ANION GAP SERPL CALCULATED.3IONS-SCNC: 7 MMOL/L (ref 7–15)
AST SERPL W P-5'-P-CCNC: 12 U/L (ref 0–45)
BASOPHILS # BLD AUTO: 0 10E3/UL (ref 0–0.2)
BASOPHILS NFR BLD AUTO: 0 %
BILIRUB SERPL-MCNC: 0.3 MG/DL
BUN SERPL-MCNC: 8 MG/DL (ref 6–20)
CALCIUM SERPL-MCNC: 9.1 MG/DL (ref 8.6–10)
CHLORIDE SERPL-SCNC: 107 MMOL/L (ref 98–107)
CREAT SERPL-MCNC: 0.79 MG/DL (ref 0.51–0.95)
D DIMER PPP FEU-MCNC: 0.75 UG/ML FEU (ref 0–0.5)
DEPRECATED HCO3 PLAS-SCNC: 29 MMOL/L (ref 22–29)
EGFRCR SERPLBLD CKD-EPI 2021: >90 ML/MIN/1.73M2
EOSINOPHIL # BLD AUTO: 0 10E3/UL (ref 0–0.7)
EOSINOPHIL NFR BLD AUTO: 0 %
ERYTHROCYTE [DISTWIDTH] IN BLOOD BY AUTOMATED COUNT: 13 % (ref 10–15)
GLUCOSE SERPL-MCNC: 107 MG/DL (ref 70–99)
HCT VFR BLD AUTO: 36.6 % (ref 35–47)
HGB BLD-MCNC: 11.4 G/DL (ref 11.7–15.7)
HOLD SPECIMEN: NORMAL
HOLD SPECIMEN: NORMAL
IMM GRANULOCYTES # BLD: 0 10E3/UL
IMM GRANULOCYTES NFR BLD: 0 %
LYMPHOCYTES # BLD AUTO: 2.7 10E3/UL (ref 0.8–5.3)
LYMPHOCYTES NFR BLD AUTO: 30 %
MCH RBC QN AUTO: 29.5 PG (ref 26.5–33)
MCHC RBC AUTO-ENTMCNC: 31.1 G/DL (ref 31.5–36.5)
MCV RBC AUTO: 95 FL (ref 78–100)
MONOCYTES # BLD AUTO: 1 10E3/UL (ref 0–1.3)
MONOCYTES NFR BLD AUTO: 11 %
NEUTROPHILS # BLD AUTO: 5.1 10E3/UL (ref 1.6–8.3)
NEUTROPHILS NFR BLD AUTO: 59 %
NRBC # BLD AUTO: 0 10E3/UL
NRBC BLD AUTO-RTO: 0 /100
PLATELET # BLD AUTO: 259 10E3/UL (ref 150–450)
POTASSIUM SERPL-SCNC: 4.4 MMOL/L (ref 3.4–5.3)
PROT SERPL-MCNC: 6.2 G/DL (ref 6.4–8.3)
RBC # BLD AUTO: 3.86 10E6/UL (ref 3.8–5.2)
SODIUM SERPL-SCNC: 143 MMOL/L (ref 135–145)
TROPONIN T SERPL HS-MCNC: <6 NG/L
WBC # BLD AUTO: 8.9 10E3/UL (ref 4–11)

## 2024-04-20 PROCEDURE — 93005 ELECTROCARDIOGRAM TRACING: CPT | Performed by: STUDENT IN AN ORGANIZED HEALTH CARE EDUCATION/TRAINING PROGRAM

## 2024-04-20 PROCEDURE — 85025 COMPLETE CBC W/AUTO DIFF WBC: CPT | Performed by: EMERGENCY MEDICINE

## 2024-04-20 PROCEDURE — 80053 COMPREHEN METABOLIC PANEL: CPT | Performed by: EMERGENCY MEDICINE

## 2024-04-20 PROCEDURE — 84484 ASSAY OF TROPONIN QUANT: CPT | Performed by: EMERGENCY MEDICINE

## 2024-04-20 PROCEDURE — 96361 HYDRATE IV INFUSION ADD-ON: CPT

## 2024-04-20 PROCEDURE — 71275 CT ANGIOGRAPHY CHEST: CPT

## 2024-04-20 PROCEDURE — 250N000011 HC RX IP 250 OP 636: Performed by: EMERGENCY MEDICINE

## 2024-04-20 PROCEDURE — 85379 FIBRIN DEGRADATION QUANT: CPT | Performed by: EMERGENCY MEDICINE

## 2024-04-20 PROCEDURE — 96374 THER/PROPH/DIAG INJ IV PUSH: CPT | Mod: 59

## 2024-04-20 PROCEDURE — 99285 EMERGENCY DEPT VISIT HI MDM: CPT | Mod: 25

## 2024-04-20 PROCEDURE — 258N000003 HC RX IP 258 OP 636: Performed by: EMERGENCY MEDICINE

## 2024-04-20 PROCEDURE — 36415 COLL VENOUS BLD VENIPUNCTURE: CPT | Performed by: EMERGENCY MEDICINE

## 2024-04-20 RX ORDER — IOPAMIDOL 755 MG/ML
100 INJECTION, SOLUTION INTRAVASCULAR ONCE
Status: COMPLETED | OUTPATIENT
Start: 2024-04-20 | End: 2024-04-20

## 2024-04-20 RX ADMIN — IOPAMIDOL 90 ML: 755 INJECTION, SOLUTION INTRAVENOUS at 14:16

## 2024-04-20 RX ADMIN — SODIUM CHLORIDE 1000 ML: 9 INJECTION, SOLUTION INTRAVENOUS at 13:22

## 2024-04-20 RX ADMIN — HYDROMORPHONE HYDROCHLORIDE 0.5 MG: 1 INJECTION, SOLUTION INTRAMUSCULAR; INTRAVENOUS; SUBCUTANEOUS at 14:32

## 2024-04-20 ASSESSMENT — ACTIVITIES OF DAILY LIVING (ADL)
ADLS_ACUITY_SCORE: 35

## 2024-04-20 NOTE — DISCHARGE INSTRUCTIONS
As discussed your episode of passing out was likely a combination of your recent surgery and medications you received  No signs of any serious life-threatening event  Your hemoglobin is lower after surgery however nothing unexpected.  This should increase on its own  Rest, fluid hydrate  Continue home medications  If you are feeling dizzy lay down immediately  Return if any recurrent symptoms  Follow-up with your surgeon as scheduled or sooner if needed

## 2024-04-20 NOTE — ED NOTES
Bed: [place] [address]  Expected date:   Expected time:   Means of arrival:   Comments:  [facility] - Syncope 39F

## 2024-04-20 NOTE — ED TRIAGE NOTES
Pt had surgery for breast reduction/abdominalplasty yesterday. About an  hour ago pt had a syncopal while on the toilet. Daughter was with pt and pt did not fall   Triage Assessment (Adult)       Row Name 04/20/24 1301          Triage Assessment    Airway WDL WDL        Respiratory WDL    Respiratory WDL WDL        Skin Circulation/Temperature WDL    Skin Circulation/Temperature WDL WDL        Cardiac WDL    Cardiac WDL X  h/o syncopal episode this am        Peripheral/Neurovascular WDL    Peripheral Neurovascular WDL WDL        Cognitive/Neuro/Behavioral WDL    Cognitive/Neuro/Behavioral WDL WDL

## 2024-04-20 NOTE — ED PROVIDER NOTES
EMERGENCY DEPARTMENT ENCOUNTER      NAME: Brent Walters  AGE: 38 year old female  YOB: 1985  MRN: [id number]  EVALUATION DATE & TIME: 4/20/2024 12:54 PM    PCP: Josefina Aquino    ED PROVIDER: Sherie Ma DO      Chief Complaint   Patient presents with    Syncope         FINAL IMPRESSION:  1. Syncope, unspecified syncope type          ED COURSE & MEDICAL DECISION MAKING:    Pertinent Labs & Imaging studies reviewed. (See chart for details)  1:16 PM I met the patient and performed my initial interview and exam.    38 year old female presents to the Emergency Department for evaluation of syncope.  Status post bilateral breast reduction and abdominoplasty 1 day ago.  She has taken a couple doses of oxycodone for her pain.  Pain is as expected postsurgery.  Patient was up walking around for some time, felt lightheaded and nauseous.  Sat on the toilet to urinate and had a brief syncopal event.  No headache.  No falls.  Blood pressure is low per EMS.  She is nontoxic-appearing.  HERMAN drains in place to abdominal incision, draining cherry colored fluid.  No significant tenderness out of proportion.  No wound dehiscence.  Not consistent with any intra-abdominal bleeding.  EKG without evidence of arrhythmia or ischemia.  She was given IV fluids here.  High sensitive troponin is negative.  Hemoglobin 11, down 2 g from prior to surgery.  This is expected and not consistent with acute ongoing blood loss.  D-dimer is elevated, CT imaging without evidence of pulmonary embolism.  She does have some atelectasis as expected.  Patient felt improved while here.  She was able to stand and ambulate without significant symptoms.  Labs otherwise reassuring.  Did discuss likely combination of pain, medications and recent anesthesia.  We discussed symptomatic care for home and return precautions    At the conclusion of the encounter I discussed the results of all of the tests and the disposition. The questions were  answered. The patient or family acknowledged understanding and was agreeable with the care plan.     Medical Decision Making  Obtained supplemental history:Supplemental history obtained?: Documented in chart, Family Member/Significant Other, and EMS  Reviewed external records: External records reviewed?: Documented in chart  Care impacted by chronic illness:N/A  Care significantly affected by social determinants of health:N/A  Did you consider but not order tests?: Work up considered but not performed and documented in chart, if applicable  Did you interpret images independently?: Independent interpretation of ECG and images noted in documentation, when applicable.  Consultation discussion with other provider:Did you involve another provider (consultant, , pharmacy, etc.)?: No  Discharge. No recommendations on prescription strength medication(s). I considered admission, but discharged patient after significant clinical improvement.    MEDICATIONS GIVEN IN THE EMERGENCY:  Medications   sodium chloride 0.9% BOLUS 1,000 mL (0 mLs Intravenous Stopped 4/20/24 1547)   iopamidol (ISOVUE-370) solution 100 mL (90 mLs Intravenous $Given 4/20/24 1416)       NEW PRESCRIPTIONS STARTED AT TODAY'S ER VISIT  Discharge Medication List as of 4/20/2024  3:57 PM             =================================================================    HPI    Patient information was obtained from: Patient    Use of : N/A         Brent Walters is a 38 year old female with a pertinent history of anxiety/depression who presents to this ED by EMS for evaluation of syncope.  Patient is 1 day status post abdominoplasty and bilateral breast reduction.  She denies any concerns or complications with her surgery.  Pain has been as expected.  She took a dose of oxycodone last night and again this morning around 9 AM.  She reports she was up walking around, felt dizzy and nauseous.  Went to the bathroom.  While she was urinating had a syncopal  episode.  She did not fall.  Her daughter or wife were with her the whole time.  Noted perhaps some brief jerking of her right upper extremity.  Blood pressure low per EMS, blood sugars unremarkable.  She given a dose of Zofran prior to arrival.  She reports feeling tired.  She has no concern about her pain at this time.  Drains to her abdominal incision have not been draining since last evening.  No chest pain or shortness of breath.  No headache.  She reports having a similar episode 12 years ago when she initially had an abdominoplasty, however did not require evaluation in the emergency department at that time.      REVIEW OF SYSTEMS   Per HPI    PAST MEDICAL HISTORY:  Past Medical History:   Diagnosis Date    Cervical high risk HPV (human papillomavirus) test positive 08/16/2017 08/16/17,08/14/18, 10/14/20    Depressive disorder     on meds    Diabetes (H)     gestational diabetic    Hyperemesis gravidarum 4/9/2019    LSIL (low grade squamous intraepithelial lesion) on Pap smear 11/2011    colp - QUIQUE I    Spinal headache 10/16/2019       PAST SURGICAL HISTORY:  Past Surgical History:   Procedure Laterality Date    ABDOMEN SURGERY      Tummy guadalupeck    BLOOD PATCH N/A 10/16/2019    Procedure: INJECTION, BLOOD PATCH, EPIDURAL;  Surgeon: Darvin Karimi MD;  Location: [facility] OR    BREAST SURGERY      breast reduction 2016    D & C      SURGICAL HISTORY OF -       tummy karen           CURRENT MEDICATIONS:    Cholecalciferol (VITAMIN D PO)  escitalopram (LEXAPRO) 10 MG tablet  ketoconazole (NIZORAL) 2 % external shampoo  tiZANidine (ZANAFLEX) 4 MG tablet         ALLERGIES:  Allergies   Allergen Reactions    Sulfa Antibiotics Swelling     Sulfas in eye drops, caused redness, irritation, swelling to eyes  PN: LW Reaction: rash         FAMILY HISTORY:  Family History   Problem Relation Age of Onset    Hyperlipidemia Mother     Depression Mother     Skin Cancer Mother     Hypertension Father     Depression Father      "Anxiety Disorder Father     Cerebrovascular Disease Maternal Grandmother     Cancer Paternal Grandmother         pancreatic    Other Cancer Paternal Grandmother     Asthma No family hx of     C.A.D. No family hx of     Breast Cancer No family hx of     Cancer - colorectal No family hx of     Diabetes No family hx of     Glaucoma No family hx of     Macular Degeneration No family hx of        SOCIAL HISTORY:   Social History     Socioeconomic History    Marital status:     Number of children: 1   Occupational History     Employer: [employer] [place]   Tobacco Use    Smoking status: Never    Smokeless tobacco: Never    Tobacco comments:     none   Vaping Use    Vaping status: Never Used   Substance and Sexual Activity    Alcohol use: Yes     Comment: Occasionally    Drug use: No     Comment: none    Sexual activity: Yes     Partners: Female     Birth control/protection: None   Other Topics Concern    Parent/sibling w/ CABG, MI or angioplasty before 65F 55M? No       VITALS:  /58   Pulse 80   Temp 98.5  F (36.9  C) (Oral)   Resp 28   Ht 1.702 m (5' 7\")   Wt 90.7 kg (200 lb)   LMP 03/03/2024 (Exact Date)   SpO2 95%   BMI 31.32 kg/m      PHYSICAL EXAM    Physical Exam  Constitutional:       General: She is not in acute distress.  HENT:      Head: Normocephalic and atraumatic.      Mouth/Throat:      Pharynx: Oropharynx is clear.   Eyes:      Pupils: Pupils are equal, round, and reactive to light.   Cardiovascular:      Rate and Rhythm: Normal rate and regular rhythm.      Pulses: Normal pulses.      Heart sounds: Normal heart sounds.   Pulmonary:      Effort: Pulmonary effort is normal.      Breath sounds: Normal breath sounds.   Chest:      Comments: Chest binder in place.  Well-approximated surgical incisions  Abdominal:      General: Abdomen is flat. Bowel sounds are normal.      Palpations: Abdomen is soft.      Tenderness: There is generalized abdominal tenderness.      Comments: Abdominal binder in place.  " Surgical incision well-approximated.  2 HERMAN drains in place.  55 cc in right drain, cherry colored.  30 cc in left drain, cherry colored   Musculoskeletal:         General: Normal range of motion.   Skin:     General: Skin is warm and dry.      Capillary Refill: Capillary refill takes less than 2 seconds.   Neurological:      General: No focal deficit present.      Mental Status: She is alert and oriented to person, place, and time.           LAB:  All pertinent labs reviewed and interpreted.  Labs Ordered and Resulted from Time of ED Arrival to Time of ED Departure   COMPREHENSIVE METABOLIC PANEL - Abnormal       Result Value    Sodium 143      Potassium 4.4      Carbon Dioxide (CO2) 29      Anion Gap 7      Urea Nitrogen 8.0      Creatinine 0.79      GFR Estimate >90      Calcium 9.1      Chloride 107      Glucose 107 (*)     Alkaline Phosphatase 38 (*)     AST 12      ALT 6      Protein Total 6.2 (*)     Albumin 3.6      Bilirubin Total 0.3     D DIMER QUANTITATIVE - Abnormal    D-Dimer Quantitative 0.75 (*)    CBC WITH PLATELETS AND DIFFERENTIAL - Abnormal    WBC Count 8.9      RBC Count 3.86      Hemoglobin 11.4 (*)     Hematocrit 36.6      MCV 95      MCH 29.5      MCHC 31.1 (*)     RDW 13.0      Platelet Count 259      % Neutrophils 59      % Lymphocytes 30      % Monocytes 11      % Eosinophils 0      % Basophils 0      % Immature Granulocytes 0      NRBCs per 100 WBC 0      Absolute Neutrophils 5.1      Absolute Lymphocytes 2.7      Absolute Monocytes 1.0      Absolute Eosinophils 0.0      Absolute Basophils 0.0      Absolute Immature Granulocytes 0.0      Absolute NRBCs 0.0     TROPONIN T, HIGH SENSITIVITY - Normal    Troponin T, High Sensitivity <6         RADIOLOGY:  Reviewed all pertinent imaging. Please see official radiology report.  CT Chest Pulmonary Embolism w Contrast   Final Result   IMPRESSION:   1.  No evidence of pulmonary embolism.   2.  Foci of air within the subcutaneous tissue of the  bilateral breasts and contralateral chest wall, likely related to recent breast reduction surgery and abdominoplasty.                EKG:    Performed at: 1303    Impression: Sinus rhythm, no acute ST elevations or depressions, no prior EKG available for comparison    Rate: 70 bpm  Rhythm: Sinus  Axis: Normal  OR Interval: 144 ms  QRS Interval: 82 ms  QTc Interval: 423 ms  ST Changes: None    I have independently reviewed and interpreted the EKG(s) documented above.    Sherie Ma DO  Emergency Medicine  [facility] EMERGENCY DEPARTMENT  [address] [place] [id number] [phone]  Dept: [phone]       Sherie Ma DO  04/21/24 0731

## 2024-04-28 LAB
ATRIAL RATE - MUSE: 70 BPM
DIASTOLIC BLOOD PRESSURE - MUSE: 65 MMHG
INTERPRETATION ECG - MUSE: NORMAL
P AXIS - MUSE: 35 DEGREES
PR INTERVAL - MUSE: 144 MS
QRS DURATION - MUSE: 82 MS
QT - MUSE: 392 MS
QTC - MUSE: 423 MS
R AXIS - MUSE: 10 DEGREES
SYSTOLIC BLOOD PRESSURE - MUSE: 107 MMHG
T AXIS - MUSE: 25 DEGREES
VENTRICULAR RATE- MUSE: 70 BPM

## 2024-06-01 ENCOUNTER — HOSPITAL ENCOUNTER (EMERGENCY)
Facility: HOSPITAL | Age: 39
Discharge: HOME | End: 2024-06-01
Attending: EMERGENCY MEDICINE | Admitting: EMERGENCY MEDICINE
Payer: COMMERCIAL

## 2024-06-01 VITALS
SYSTOLIC BLOOD PRESSURE: 124 MMHG | RESPIRATION RATE: 26 BRPM | TEMPERATURE: 97.8 F | BODY MASS INDEX: 30.01 KG/M2 | WEIGHT: 198 LBS | OXYGEN SATURATION: 99 % | DIASTOLIC BLOOD PRESSURE: 77 MMHG | HEIGHT: 68 IN | HEART RATE: 77 BPM

## 2024-06-01 DIAGNOSIS — R55 NEAR SYNCOPE: ICD-10-CM

## 2024-06-01 PROCEDURE — 250N000011 HC RX IP 250 OP 636: Performed by: EMERGENCY MEDICINE

## 2024-06-01 PROCEDURE — 96361 HYDRATE IV INFUSION ADD-ON: CPT

## 2024-06-01 PROCEDURE — 99284 EMERGENCY DEPT VISIT MOD MDM: CPT | Mod: 25

## 2024-06-01 PROCEDURE — 96374 THER/PROPH/DIAG INJ IV PUSH: CPT | Mod: 59

## 2024-06-01 PROCEDURE — 258N000003 HC RX IP 258 OP 636: Performed by: EMERGENCY MEDICINE

## 2024-06-01 RX ADMIN — SODIUM CHLORIDE 1000 ML: 9 INJECTION, SOLUTION INTRAVENOUS at 00:40

## 2024-06-01 RX ADMIN — FAMOTIDINE 20 MG: 10 INJECTION, SOLUTION INTRAVENOUS at 00:41

## 2024-06-01 ASSESSMENT — COLUMBIA-SUICIDE SEVERITY RATING SCALE - C-SSRS
2. HAVE YOU ACTUALLY HAD ANY THOUGHTS OF KILLING YOURSELF IN THE PAST MONTH?: NO
1. IN THE PAST MONTH, HAVE YOU WISHED YOU WERE DEAD OR WISHED YOU COULD GO TO SLEEP AND NOT WAKE UP?: NO
6. HAVE YOU EVER DONE ANYTHING, STARTED TO DO ANYTHING, OR PREPARED TO DO ANYTHING TO END YOUR LIFE?: NO

## 2024-06-01 ASSESSMENT — ACTIVITIES OF DAILY LIVING (ADL)
ADLS_ACUITY_SCORE: 35
ADLS_ACUITY_SCORE: 35

## 2024-06-01 NOTE — ED TRIAGE NOTES
Pt to ED brought in by [facility].  Around 7 in the evening, pt took 2mg edible THC and drank a glass of wine. At 11pm, took Ibuprofen and Lexapro and after few minutes,  pt felt light headed and dizzy, accompanied with nausea and vomiting.  S/P tummy tuck and breast reduction last month. Medics gave her 4mg Zofran and given IV fluid.

## 2024-06-01 NOTE — ED NOTES
"Pt states nausea has improved and is not feeling nauseous after drinking water but still feels \"kind of weird.\"   "

## 2024-06-01 NOTE — ED PROVIDER NOTES
"EMERGENCY DEPARTMENT ENCOUNTER      NAME: Brent Walters  AGE: 38 year old female  YOB: 1985  MRN: [id number]  EVALUATION DATE & TIME: 6/1/2024 12:25 AM    PCP: Josefina Aquino    ED PROVIDER: Sascha Brennan M.D.      Chief Complaint   Patient presents with    Dizziness         FINAL IMPRESSION:  Near syncope      ED COURSE & MEDICAL DECISION MAKING:    Pertinent Labs & Imaging studies reviewed. (See chart for details)  38 year old female presents to the Emergency Department for evaluation of lightheadedness and nausea.  Patient had onset shortly prior to arrival as she is laying in bed.  Patient does report having a 2 mg gummy and glass of wine earlier.  States this is not unusual for her to have.  States she felt well until she had gotten into bed.  This is shortly after taking her nighttime medications of Lexapro and Motrin.  Reports still feeling \"a little off\".  Denies any vomiting or diarrhea.  Past medical history significant for breast reduction and abdominoplasty approximately 6 weeks ago.  Reports no symptoms related to surgery.  Exam reveals well-nourished well-developed female in mild distress.  Vital signs unremarkable.  Heart and lungs normal.  Abdomen soft and nontender neurologically patient alert appropriate.  Vital signs significant for absence of tachycardia and borderline blood pressure.  Patient likely suffering from effects of the nausea, alcohol and THC.  Patient treated symptomatically with Zofran and route.  Liter bolus of normal saline given here along with Pepcid.  No indications for laboratory evaluation or imaging.. Patient appears non toxic with stable vitals signs. Overall exam is benign.      12:30 AM I met with the patient for the initial interview and physical examination. Discussed plan for treatment and workup in the ED. Provider wore a surgical mask  2:00 AM I rechecked the patient.  Patient feeling much improved.  Family member now present.  Will proceed with " "plans for discharge.  No indications for further evaluation, hospitalization given patient's improvement.    At the conclusion of the encounter I discussed the results of all of the tests and the disposition. The questions were answered and return precautions provided. The patient or family acknowledged understanding and was agreeable with the care plan.         MEDICATIONS GIVEN IN THE EMERGENCY:  Medications   sodium chloride 0.9% BOLUS 1,000 mL (1,000 mLs Intravenous $New Bag 6/1/24 0040)   famotidine (PEPCID) injection 20 mg (20 mg Intravenous $Given 6/1/24 0041)       NEW PRESCRIPTIONS STARTED AT TODAY'S ER VISIT  New Prescriptions    No medications on file          =================================================================    HPI    Patient information was obtained from: Patient     Use of Intrepreter: N/A       Brent Walters is a 38 year old female with a pertient medical history of anxiety, major depressive disorder, SAWYER, who presents to the ED for evaluation of lightheadedness    Per chart review: The patient was seen in this ED on 4/20/2024 for syncope.  Patient arrived to the ED 1 day after having a bilateral breast reduction and abdominoplasty.  Patient was taking oxycodone for the pain and felt lightheaded and nauseous.  Patient's D-dimer elevated, but CT imaging without evidence of pulmonary embolism.  Patient's troponin normal patient was feeling better after receiving fluids and Dilaudid.    Patient reports she had a 2 mg edible this evening and a \"little bit after\" she drank glass of wine while eating dinner.  Patient states that \"everything was fine\" until about 3 to 4 hours later she was laying in bed and felt lightheaded, nauseous, and as though she was going to faint, but denies loss of consciousness.  The patient reports she is still feeling \"weak\" and that she has \"no control\" and that she feels as though she is \"talking slower than normal\".  The patient reports taking ibuprofen and " "Lexapro tonight as well.  Patient reports she drank only 1 glass of wine tonight and reports having the same 2 mg edibles before and takes them about a \"couple times a month\".  Patient denies any vomiting, diarrhea, loss of consciousness, or any other symptoms or complaints.    Nurse reports that the EMS gave the patient Zofran and fluids.    REVIEW OF SYSTEMS   Constitutional:  Denies fever, chills  Respiratory:  Denies productive cough or increased work of breathing  Cardiovascular:  Denies chest pain, palpitations  GI:  Denies abdominal pain, vomiting, or change in bowel or bladder habits. Positive for nausea   Musculoskeletal:  Denies any new muscle/joint swelling  Skin:  Denies rash   Neurologic:  Denies focal weakness. Positive for lightheadedness   All systems negative except as marked.     PAST MEDICAL HISTORY:  Past Medical History:   Diagnosis Date    Cervical high risk HPV (human papillomavirus) test positive 08/16/2017 08/16/17,08/14/18, 10/14/20    Depressive disorder     on meds    Diabetes (H)     gestational diabetic    Hyperemesis gravidarum 4/9/2019    LSIL (low grade squamous intraepithelial lesion) on Pap smear 11/2011    colp - QUIQUE I    Spinal headache 10/16/2019       PAST SURGICAL HISTORY:  Past Surgical History:   Procedure Laterality Date    ABDOMEN SURGERY      Tummy tuck    BLOOD PATCH N/A 10/16/2019    Procedure: INJECTION, BLOOD PATCH, EPIDURAL;  Surgeon: Darvin Karimi MD;  Location: [facility] OR    BREAST SURGERY      breast reduction 2016    D & C      SURGICAL HISTORY OF -       tummy tuck         CURRENT MEDICATIONS:      Current Facility-Administered Medications:     sodium chloride 0.9% BOLUS 1,000 mL, 1,000 mL, Intravenous, Once, Sascha Brennan MD, Last Rate: 1,000 mL/hr at 06/01/24 0040, 1,000 mL at 06/01/24 0040    Current Outpatient Medications:     Cholecalciferol (VITAMIN D PO), , Disp: , Rfl:     escitalopram (LEXAPRO) 10 MG tablet, Take 1.5 tablets (15 mg) by mouth " "daily, Disp: 135 tablet, Rfl: 3    ketoconazole (NIZORAL) 2 % external shampoo, Apply topically to the scalp, leave on for 5min prior to rinsing. Use daily for flares, then 1x weekly for maintenance., Disp: 120 mL, Rfl: 1    tiZANidine (ZANAFLEX) 4 MG tablet, Take 1 tablet (4 mg) by mouth 3 times daily as needed for muscle spasms, Disp: 30 tablet, Rfl: 12    ALLERGIES:  Allergies   Allergen Reactions    Sulfa Antibiotics Swelling     Sulfas in eye drops, caused redness, irritation, swelling to eyes  PN: LW Reaction: rash         FAMILY HISTORY:  Family History   Problem Relation Age of Onset    Hyperlipidemia Mother     Depression Mother     Skin Cancer Mother     Hypertension Father     Depression Father     Anxiety Disorder Father     Cerebrovascular Disease Maternal Grandmother     Cancer Paternal Grandmother         pancreatic    Other Cancer Paternal Grandmother     Asthma No family hx of     C.A.D. No family hx of     Breast Cancer No family hx of     Cancer - colorectal No family hx of     Diabetes No family hx of     Glaucoma No family hx of     Macular Degeneration No family hx of        SOCIAL HISTORY:   Social History     Socioeconomic History    Marital status:      Spouse name: None    Number of children: 1    Years of education: None    Highest education level: None   Occupational History     Employer: [employer]   Tobacco Use    Smoking status: Never    Smokeless tobacco: Never    Tobacco comments:     none   Vaping Use    Vaping status: Never Used   Substance and Sexual Activity    Alcohol use: Yes     Comment: Occasionally    Drug use: No     Comment: none    Sexual activity: Yes     Partners: Female     Birth control/protection: None   Other Topics Concern    Parent/sibling w/ CABG, MI or angioplasty before 65F 55M? No       VITALS:  Patient Vitals for the past 24 hrs:   BP Temp Pulse Resp Height Weight   06/01/24 0036 118/73 97.8  F (36.6  C) 68 20 1.715 m (5' 7.5\") 89.8 kg (198 lb)      "   PHYSICAL EXAM    Constitutional:  Awake, alert, in  mild distress  HENT:  Normocephalic, Atraumatic. Bilateral external ears normal. Oropharynx moist. Nose normal. Neck- Normal range of motion with no guarding, Supple, No stridor.   Eyes:  PERRL, EOMI with no signs of entrapment, Conjunctiva normal, No discharge.   Respiratory:  Normal breath sounds, No respiratory distress, No wheezing.    Cardiovascular:  Normal heart rate, Normal rhythm, No appreciable rubs or gallops.   GI:  Soft, No tenderness, No distension, No palpable masses  Musculoskeletal:  No edema. Good range of motion in all major joints. No tenderness to palpation or major deformities noted.  Integument:  Warm, Dry, No erythema, No rash.   Neurologic:  Alert & oriented, Normal motor function, Normal sensory function, No focal deficits noted.   Psychiatric:  Affect normal, Judgment normal, Mood normal.         I, Ashley Knight, am serving as a scribe to document services personally performed by Sascha Brennan MD, based on my observation and the provider's statements to me. I, Sascha Brennan MD attest that Ashley Knight is acting in a scribe capacity, has observed my performance of the services and has documented them in accordance with my direction.    Sascha Brennan M.D.  Emergency Medicine  [facility] EMERGENCY DEPARTMENT     Sascha Brennan MD  06/01/24 0206

## 2024-06-01 NOTE — ED NOTES
Bed: JNED-09  Expected date: 6/1/24  Expected time: 12:11 AM  Means of arrival: Ambulance  Comments:  Emi  38 F--near syncopal, s/p breast/abd surg 6 weeks ago, IVFs and IV zofran given

## 2024-06-02 ENCOUNTER — HOSPITAL ENCOUNTER (EMERGENCY)
Facility: HOSPITAL | Age: 39
Discharge: HOME | End: 2024-06-02
Attending: EMERGENCY MEDICINE | Admitting: EMERGENCY MEDICINE
Payer: COMMERCIAL

## 2024-06-02 ENCOUNTER — MYC MEDICAL ADVICE (OUTPATIENT)
Dept: FAMILY MEDICINE | Facility: CLINIC | Age: 39
End: 2024-06-02

## 2024-06-02 VITALS
DIASTOLIC BLOOD PRESSURE: 78 MMHG | HEIGHT: 68 IN | WEIGHT: 203.9 LBS | OXYGEN SATURATION: 98 % | HEART RATE: 81 BPM | SYSTOLIC BLOOD PRESSURE: 131 MMHG | BODY MASS INDEX: 30.9 KG/M2 | RESPIRATION RATE: 18 BRPM | TEMPERATURE: 97.9 F

## 2024-06-02 DIAGNOSIS — R20.2 PARESTHESIA: ICD-10-CM

## 2024-06-02 DIAGNOSIS — R53.83 OTHER FATIGUE: ICD-10-CM

## 2024-06-02 LAB
ALBUMIN UR-MCNC: NEGATIVE MG/DL
AMPHETAMINES UR QL SCN: ABNORMAL
ANION GAP SERPL CALCULATED.3IONS-SCNC: 10 MMOL/L (ref 7–15)
APPEARANCE UR: CLEAR
BARBITURATES UR QL SCN: ABNORMAL
BASOPHILS # BLD AUTO: NORMAL 10*3/UL
BASOPHILS # BLD MANUAL: 0 10E3/UL (ref 0–0.2)
BASOPHILS NFR BLD AUTO: NORMAL %
BASOPHILS NFR BLD MANUAL: 0 %
BENZODIAZ UR QL SCN: ABNORMAL
BILIRUB UR QL STRIP: NEGATIVE
BUN SERPL-MCNC: 12.6 MG/DL (ref 6–20)
BZE UR QL SCN: ABNORMAL
CALCIUM SERPL-MCNC: 9.5 MG/DL (ref 8.6–10)
CANNABINOIDS UR QL SCN: ABNORMAL
CHLORIDE SERPL-SCNC: 103 MMOL/L (ref 98–107)
COLOR UR AUTO: YELLOW
CREAT SERPL-MCNC: 0.81 MG/DL (ref 0.51–0.95)
DEPRECATED HCO3 PLAS-SCNC: 24 MMOL/L (ref 22–29)
EGFRCR SERPLBLD CKD-EPI 2021: >90 ML/MIN/1.73M2
EOSINOPHIL # BLD AUTO: NORMAL 10*3/UL
EOSINOPHIL # BLD MANUAL: 0.1 10E3/UL (ref 0–0.7)
EOSINOPHIL NFR BLD AUTO: NORMAL %
EOSINOPHIL NFR BLD MANUAL: 1 %
ERYTHROCYTE [DISTWIDTH] IN BLOOD BY AUTOMATED COUNT: 12.6 % (ref 10–15)
ETHANOL SERPL-MCNC: <0.01 G/DL
FENTANYL UR QL: ABNORMAL
FLUAV RNA SPEC QL NAA+PROBE: NEGATIVE
FLUBV RNA RESP QL NAA+PROBE: NEGATIVE
GLUCOSE SERPL-MCNC: 108 MG/DL (ref 70–99)
GLUCOSE UR STRIP-MCNC: NEGATIVE MG/DL
HCG SERPL QL: NEGATIVE
HCT VFR BLD AUTO: 39 % (ref 35–47)
HGB BLD-MCNC: 12.5 G/DL (ref 11.7–15.7)
HGB UR QL STRIP: ABNORMAL
IMM GRANULOCYTES # BLD: NORMAL 10*3/UL
IMM GRANULOCYTES NFR BLD: NORMAL %
KETONES UR STRIP-MCNC: NEGATIVE MG/DL
LEUKOCYTE ESTERASE UR QL STRIP: NEGATIVE
LYMPHOCYTES # BLD AUTO: NORMAL 10*3/UL
LYMPHOCYTES # BLD MANUAL: 1.5 10E3/UL (ref 0.8–5.3)
LYMPHOCYTES NFR BLD AUTO: NORMAL %
LYMPHOCYTES NFR BLD MANUAL: 18 %
MCH RBC QN AUTO: 29.8 PG (ref 26.5–33)
MCHC RBC AUTO-ENTMCNC: 32.1 G/DL (ref 31.5–36.5)
MCV RBC AUTO: 93 FL (ref 78–100)
MONOCYTES # BLD AUTO: NORMAL 10*3/UL
MONOCYTES # BLD MANUAL: 0.3 10E3/UL (ref 0–1.3)
MONOCYTES NFR BLD AUTO: NORMAL %
MONOCYTES NFR BLD MANUAL: 4 %
MUCOUS THREADS #/AREA URNS LPF: PRESENT /LPF
NEUTROPHILS # BLD AUTO: NORMAL 10*3/UL
NEUTROPHILS # BLD MANUAL: 6.6 10E3/UL (ref 1.6–8.3)
NEUTROPHILS NFR BLD AUTO: NORMAL %
NEUTROPHILS NFR BLD MANUAL: 77 %
NITRATE UR QL: NEGATIVE
NRBC # BLD AUTO: 0 10E3/UL
NRBC BLD AUTO-RTO: 0 /100
OPIATES UR QL SCN: ABNORMAL
PCP QUAL URINE (ROCHE): ABNORMAL
PH UR STRIP: 5.5 [PH] (ref 5–7)
PLAT MORPH BLD: NORMAL
PLATELET # BLD AUTO: 311 10E3/UL (ref 150–450)
POTASSIUM SERPL-SCNC: 4.6 MMOL/L (ref 3.4–5.3)
RBC # BLD AUTO: 4.2 10E6/UL (ref 3.8–5.2)
RBC MORPH BLD: NORMAL
RBC URINE: 4 /HPF
RSV RNA SPEC NAA+PROBE: NEGATIVE
SARS-COV-2 RNA RESP QL NAA+PROBE: NEGATIVE
SODIUM SERPL-SCNC: 137 MMOL/L (ref 135–145)
SP GR UR STRIP: 1.02 (ref 1–1.03)
SQUAMOUS EPITHELIAL: <1 /HPF
UROBILINOGEN UR STRIP-MCNC: <2 MG/DL
WBC # BLD AUTO: 8.6 10E3/UL (ref 4–11)
WBC URINE: 0 /HPF

## 2024-06-02 PROCEDURE — 85041 AUTOMATED RBC COUNT: CPT | Performed by: EMERGENCY MEDICINE

## 2024-06-02 PROCEDURE — 80307 DRUG TEST PRSMV CHEM ANLYZR: CPT | Performed by: EMERGENCY MEDICINE

## 2024-06-02 PROCEDURE — 80048 BASIC METABOLIC PNL TOTAL CA: CPT | Performed by: EMERGENCY MEDICINE

## 2024-06-02 PROCEDURE — 36415 COLL VENOUS BLD VENIPUNCTURE: CPT | Performed by: EMERGENCY MEDICINE

## 2024-06-02 PROCEDURE — 82077 ASSAY SPEC XCP UR&BREATH IA: CPT | Performed by: EMERGENCY MEDICINE

## 2024-06-02 PROCEDURE — 99283 EMERGENCY DEPT VISIT LOW MDM: CPT

## 2024-06-02 PROCEDURE — 84703 CHORIONIC GONADOTROPIN ASSAY: CPT | Performed by: EMERGENCY MEDICINE

## 2024-06-02 PROCEDURE — 81001 URINALYSIS AUTO W/SCOPE: CPT | Mod: XU | Performed by: EMERGENCY MEDICINE

## 2024-06-02 PROCEDURE — 87637 SARSCOV2&INF A&B&RSV AMP PRB: CPT | Performed by: EMERGENCY MEDICINE

## 2024-06-02 PROCEDURE — 85007 BL SMEAR W/DIFF WBC COUNT: CPT | Performed by: EMERGENCY MEDICINE

## 2024-06-02 ASSESSMENT — COLUMBIA-SUICIDE SEVERITY RATING SCALE - C-SSRS
6. HAVE YOU EVER DONE ANYTHING, STARTED TO DO ANYTHING, OR PREPARED TO DO ANYTHING TO END YOUR LIFE?: NO
1. IN THE PAST MONTH, HAVE YOU WISHED YOU WERE DEAD OR WISHED YOU COULD GO TO SLEEP AND NOT WAKE UP?: NO
2. HAVE YOU ACTUALLY HAD ANY THOUGHTS OF KILLING YOURSELF IN THE PAST MONTH?: NO

## 2024-06-02 ASSESSMENT — ACTIVITIES OF DAILY LIVING (ADL)
ADLS_ACUITY_SCORE: 33
ADLS_ACUITY_SCORE: 33

## 2024-06-02 NOTE — ED NOTES
Pt/daughter state pt has had cotton mouth/dry mouth, tingling on and off, feeling off balance, blurry vision, and having a hard time finding thoughts. Daughter states pt had an edible on Friday, had these s/s then. These s/s went away and came back today.

## 2024-06-02 NOTE — ED PROVIDER NOTES
"EMERGENCY DEPARTMENT ENCOUNTER      NAME: Brent Walters  AGE: 38 year old female  YOB: 1985  MRN: [id number]  EVALUATION DATE & TIME: No admission date for patient encounter.    PCP: Josefina Aquino    ED PROVIDER: Debbie Jacob M.D.      Chief Complaint   Patient presents with    \"feels off\"         FINAL IMPRESSION:  1. Paresthesia    2. Other fatigue          ED COURSE & MEDICAL DECISION MAKING:    ED Course as of 06/02/24 1507   Sun Jun 02, 2024   1329 Patient with some ongoing fatigue and paresthesias, ameanble to check chemistry, hcg, CBC, tox screen and viral PCR with reassuring examination and VS except slighlty pale for ethnicity, on period now, notes periods are not particularly heavy   1443 UA without significant cells to suggest UTI and UDS with only the known cannabinoids present.   1456 CBC and chemistry reassuringly WNL and EtOH undetectable. Pt with paresthesias of unknonw etiology improved currently. Patient discharged after being provided with extensive anticipatory guidance and given return precautions, importance of PMD follow-up emphasized.        Pertinent Labs & Imaging studies reviewed. (See chart for details)      At the conclusion of the encounter I discussed the results of all of the tests and the disposition. The questions were answered. The patient or family acknowledged understanding and was agreeable with the care plan.     MEDICATIONS GIVEN IN THE EMERGENCY:  Medications - No data to display    NEW PRESCRIPTIONS STARTED AT TODAY'S ER VISIT  New Prescriptions    No medications on file          =================================================================    HPI      Brent Walters is a 38 year old female with PMHx of generalized anxiety disorder, MDD, elevated BMI who presents to the ED today via private vehicle with feeling atypical.    Per my chart review, patient was seen yesterday in the ED for feeling somewhat lightheaded and nauseated after consuming THC " "edible with a glass of wine with her evening lexapro, feeling \"off\". VS were normal and examination was reassuring. Patient felt improved after IVF and discharged to home with family.     Today she is here with her adult daughter. She reports she still feels a bit off, fatigued slightly with occasional tingling / paresthesias. No chest pain or shortness of breath, no cough, no her period now, no history of severe anemia, no new medications. No vertigo or near syncope.    Otherwise in normal state of health. No further concerns at this time.     REVIEW OF SYSTEMS   All other systems reviewed and are negative except as noted above in HPI.    PAST MEDICAL HISTORY:  Past Medical History:   Diagnosis Date    Cervical high risk HPV (human papillomavirus) test positive 08/16/2017 08/16/17,08/14/18, 10/14/20    Depressive disorder     on meds    Diabetes (H)     gestational diabetic    Hyperemesis gravidarum 4/9/2019    LSIL (low grade squamous intraepithelial lesion) on Pap smear 11/2011    colp - QUIQUE I    Spinal headache 10/16/2019       PAST SURGICAL HISTORY:  Past Surgical History:   Procedure Laterality Date    ABDOMEN SURGERY      Tummy tuck    BLOOD PATCH N/A 10/16/2019    Procedure: INJECTION, BLOOD PATCH, EPIDURAL;  Surgeon: Darvin Karimi MD;  Location: [facility] OR    BREAST SURGERY      breast reduction 2016    D & C      SURGICAL HISTORY OF -       tummy tuck       CURRENT MEDICATIONS:    Cholecalciferol (VITAMIN D PO)  escitalopram (LEXAPRO) 10 MG tablet  ketoconazole (NIZORAL) 2 % external shampoo  tiZANidine (ZANAFLEX) 4 MG tablet        ALLERGIES:  Allergies   Allergen Reactions    Sulfa Antibiotics Swelling     Sulfas in eye drops, caused redness, irritation, swelling to eyes  PN: LW Reaction: rash         FAMILY HISTORY:  Family History   Problem Relation Age of Onset    Hyperlipidemia Mother     Depression Mother     Skin Cancer Mother     Hypertension Father     Depression Father     Anxiety Disorder " "Father     Cerebrovascular Disease Maternal Grandmother     Cancer Paternal Grandmother         pancreatic    Other Cancer Paternal Grandmother     Asthma No family hx of     C.A.D. No family hx of     Breast Cancer No family hx of     Cancer - colorectal No family hx of     Diabetes No family hx of     Glaucoma No family hx of     Macular Degeneration No family hx of        SOCIAL HISTORY:   Social History     Socioeconomic History    Marital status:     Number of children: 1   Occupational History     Employer: [employer] [place]   Tobacco Use    Smoking status: Never    Smokeless tobacco: Never    Tobacco comments:     none   Vaping Use    Vaping status: Never Used   Substance and Sexual Activity    Alcohol use: Yes     Comment: Occasionally    Drug use: No     Comment: none    Sexual activity: Yes     Partners: Female     Birth control/protection: None   Other Topics Concern    Parent/sibling w/ CABG, MI or angioplasty before 65F 55M? No       VITALS:  Patient Vitals for the past 24 hrs:   BP Temp Temp src Pulse Resp SpO2 Height Weight   06/02/24 1301 (!) 148/76 97.9  F (36.6  C) Temporal 63 18 99 % 1.715 m (5' 7.5\") 92.5 kg (203 lb 14.4 oz)       PHYSICAL EXAM    GENERAL: Awake, alert.  In no acute distress.   HEENT: Normocephalic, atraumatic.  Pupils equal, round and reactive.  Conjunctiva normal.  EOMI.  NECK: No stridor or apparent deformity.  PULMONARY: Symmetrical breath sounds without distress.  Lungs clear to auscultation bilaterally without wheezes, rhonchi or rales.  CARDIO: Regular rate and rhythm.  No significant murmur, rub or gallop.  Radial pulses strong and symmetrical.  ABDOMINAL: Abdomen soft, non-distended and non-tender to palpation.  No CVAT, no palpable hepatosplenomegaly.  EXTREMITIES: No lower extremity swelling or edema.    NEURO: Alert and oriented to person, place and time.  Cranial nerves grossly intact.  No focal motor deficit.  PSYCH: Normal mood and affect  SKIN: No rashes but pale for " ethnicity     LAB:  All pertinent labs reviewed and interpreted.  Results for orders placed or performed during the hospital encounter of 06/02/24   Basic metabolic panel   Result Value Ref Range    Sodium 137 135 - 145 mmol/L    Potassium 4.6 3.4 - 5.3 mmol/L    Chloride 103 98 - 107 mmol/L    Carbon Dioxide (CO2) 24 22 - 29 mmol/L    Anion Gap 10 7 - 15 mmol/L    Urea Nitrogen 12.6 6.0 - 20.0 mg/dL    Creatinine 0.81 0.51 - 0.95 mg/dL    GFR Estimate >90 >60 mL/min/1.73m2    Calcium 9.5 8.6 - 10.0 mg/dL    Glucose 108 (H) 70 - 99 mg/dL   HCG qualitative Blood   Result Value Ref Range    hCG Serum Qualitative Negative Negative   Ethyl Alcohol Level   Result Value Ref Range    Alcohol ethyl <0.01 <=0.01 g/dL   UA with Microscopic reflex to Culture    Specimen: Urine, Midstream   Result Value Ref Range    Color Urine Yellow Colorless, Straw, Light Yellow, Yellow    Appearance Urine Clear Clear    Glucose Urine Negative Negative mg/dL    Bilirubin Urine Negative Negative    Ketones Urine Negative Negative mg/dL    Specific Gravity Urine 1.021 1.001 - 1.030    Blood Urine 0.2 mg/dL (A) Negative    pH Urine 5.5 5.0 - 7.0    Protein Albumin Urine Negative Negative mg/dL    Urobilinogen Urine <2.0 <2.0 mg/dL    Nitrite Urine Negative Negative    Leukocyte Esterase Urine Negative Negative    Mucus Urine Present (A) None Seen /LPF    RBC Urine 4 (H) <=2 /HPF    WBC Urine 0 <=5 /HPF    Squamous Epithelials Urine <1 <=1 /HPF   CBC with platelets and differential   Result Value Ref Range    WBC Count 8.6 4.0 - 11.0 10e3/uL    RBC Count 4.20 3.80 - 5.20 10e6/uL    Hemoglobin 12.5 11.7 - 15.7 g/dL    Hematocrit 39.0 35.0 - 47.0 %    MCV 93 78 - 100 fL    MCH 29.8 26.5 - 33.0 pg    MCHC 32.1 31.5 - 36.5 g/dL    RDW 12.6 10.0 - 15.0 %    Platelet Count 311 150 - 450 10e3/uL    % Neutrophils      % Lymphocytes      % Monocytes      % Eosinophils      % Basophils      % Immature Granulocytes      Absolute Neutrophils      Absolute  Lymphocytes      Absolute Monocytes      Absolute Eosinophils      Absolute Basophils      Absolute Immature Granulocytes     Urine Drug Screen Panel   Result Value Ref Range    Amphetamines Urine Screen Negative Screen Negative    Barbituates Urine Screen Negative Screen Negative    Benzodiazepine Urine Screen Negative Screen Negative    Cannabinoids Urine Screen Positive (A) Screen Negative    Cocaine Urine Screen Negative Screen Negative    Fentanyl Qual Urine Screen Negative Screen Negative    Opiates Urine Screen Negative Screen Negative    PCP Urine Screen Negative Screen Negative              Debbie Jacob MD  06/02/24 3597

## 2024-06-02 NOTE — ED NOTES
Patient refusing to leave room for discharge. States she feels dehydrated and wants IVF. Writer informed patient her labwork is good and the provider has said she is safe to discharge. Patient would like to speak to provider.

## 2024-06-04 ENCOUNTER — VIRTUAL VISIT (OUTPATIENT)
Dept: FAMILY MEDICINE | Facility: CLINIC | Age: 39
End: 2024-06-04
Payer: COMMERCIAL

## 2024-06-04 DIAGNOSIS — F44.4: Primary | ICD-10-CM

## 2024-06-04 DIAGNOSIS — F41.9 ANXIETY: ICD-10-CM

## 2024-06-04 PROCEDURE — 99214 OFFICE O/P EST MOD 30 MIN: CPT | Mod: 95 | Performed by: FAMILY MEDICINE

## 2024-06-04 PROCEDURE — 96127 BRIEF EMOTIONAL/BEHAV ASSMT: CPT | Mod: 95 | Performed by: FAMILY MEDICINE

## 2024-06-04 ASSESSMENT — PATIENT HEALTH QUESTIONNAIRE - PHQ9
SUM OF ALL RESPONSES TO PHQ QUESTIONS 1-9: 7
SUM OF ALL RESPONSES TO PHQ QUESTIONS 1-9: 7
10. IF YOU CHECKED OFF ANY PROBLEMS, HOW DIFFICULT HAVE THESE PROBLEMS MADE IT FOR YOU TO DO YOUR WORK, TAKE CARE OF THINGS AT HOME, OR GET ALONG WITH OTHER PEOPLE: VERY DIFFICULT

## 2024-06-04 NOTE — PROGRESS NOTES
"Brent is a 38 year old who is being evaluated via a billable video visit.    How would you like to obtain your AVS? MyChart  If the video visit is dropped, the invitation should be resent by: Text to cell phone: [phone]  Will anyone else be joining your video visit? No      Assessment & Plan     Dissociative neurological symptom disorder  Significant anxiety symptoms also noticing dissociative neurological symptoms.  It would be reasonable to ask neuropsych for further evaluation to rule out physiological or psychological symptoms and diagnostic clarification.  We also discussed about seeing a psychiatrist for further evaluation and she is in agreement.  - Adult Neuropsychology  Referral; Future  - Adult Mental Health  Referral; Future    Anxiety  Reviewed recent ER visit and all the workup.  We discussed her anxiety may be contributing to her symptoms and she agrees.  Life stressors.  She is on 15 mg of Lexapro and we agreed not to adjust Rx for now.        MED REC REQUIRED  Post Medication Reconciliation Status:  Discharge medications reconciled, continue medications without change  BMI  Estimated body mass index is 31.46 kg/m  as calculated from the following:    Height as of 6/2/24: 1.715 m (5' 7.5\").    Weight as of 6/2/24: 92.5 kg (203 lb 14.4 oz).             Subjective   Brent is a 38 year old, presenting for the following health issues:  Hospital F/U      6/4/2024    10:48 AM   Additional Questions   Roomed by Kaleigh JON       ED/UC Followup:    Facility:  [facility]   Date of visit: 6/1/24 and 6/2/24  Reason for visit: feels off   Current Status: slightly better but feels out of it    Reviewed ER notes -   Seen on 1st and 2nd June. (2 and 3 days ago) Near syncopy episode.   Few months ago another episode as per ER notes.    Per patient - doing better than over weekend. They thought THC and med mixed may be the cause.   Still bad anxiety. Stomach fluttering - nausea. Feels numb " from feelings than physical numbness.   Able to talk and do things but feels she is not quiet there.   Last 1.5 months hard - surgery for breast reduction and abdominoplasty. Hard recovery.   Wondering if neurologically wrong with her or is this dissociation syndrome. History of mild dissociative symptoms a year ago. Vision is fine but perception is off.     Does not think dissociative symptoms are completely from stress but could be childhood not enough support may contributed. Doing 15mg lexapro per day. Therapist every 2 weeks.     Objective         Vitals:  No vitals were obtained today due to virtual visit.    Physical Exam   GENERAL: alert and no distress  EYES: Eyes grossly normal to inspection.  No discharge or erythema, or obvious scleral/conjunctival abnormalities.  RESP: No audible wheeze, cough, or visible cyanosis.    SKIN: Visible skin clear. No significant rash, abnormal pigmentation or lesions.  NEURO: Cranial nerves grossly intact.  Mentation and speech appropriate for age.  PSYCH: Appropriate affect, tone, and pace of words         Video-Visit Details    Type of service:  Video Visit   Originating Location (pt. Location): Home    Distant Location (provider location):  On-site  Platform used for Video Visit: Nidhi  Signed Electronically by: Fred Flores MD, MD    .undefined[^^

## 2024-06-06 ENCOUNTER — MYC MEDICAL ADVICE (OUTPATIENT)
Dept: FAMILY MEDICINE | Facility: CLINIC | Age: 39
End: 2024-06-06
Payer: COMMERCIAL

## 2024-06-07 ENCOUNTER — E-VISIT (OUTPATIENT)
Dept: FAMILY MEDICINE | Facility: CLINIC | Age: 39
End: 2024-06-07
Payer: COMMERCIAL

## 2024-06-07 DIAGNOSIS — F41.9 ANXIETY: Primary | ICD-10-CM

## 2024-06-07 PROCEDURE — 99421 OL DIG E/M SVC 5-10 MIN: CPT | Performed by: FAMILY MEDICINE

## 2024-06-07 ASSESSMENT — ANXIETY QUESTIONNAIRES
8. IF YOU CHECKED OFF ANY PROBLEMS, HOW DIFFICULT HAVE THESE MADE IT FOR YOU TO DO YOUR WORK, TAKE CARE OF THINGS AT HOME, OR GET ALONG WITH OTHER PEOPLE?: VERY DIFFICULT
7. FEELING AFRAID AS IF SOMETHING AWFUL MIGHT HAPPEN: MORE THAN HALF THE DAYS
7. FEELING AFRAID AS IF SOMETHING AWFUL MIGHT HAPPEN: MORE THAN HALF THE DAYS
2. NOT BEING ABLE TO STOP OR CONTROL WORRYING: NEARLY EVERY DAY
1. FEELING NERVOUS, ANXIOUS, OR ON EDGE: NEARLY EVERY DAY
5. BEING SO RESTLESS THAT IT IS HARD TO SIT STILL: NOT AT ALL
3. WORRYING TOO MUCH ABOUT DIFFERENT THINGS: NEARLY EVERY DAY
4. TROUBLE RELAXING: NOT AT ALL
GAD7 TOTAL SCORE: 13
6. BECOMING EASILY ANNOYED OR IRRITABLE: MORE THAN HALF THE DAYS
IF YOU CHECKED OFF ANY PROBLEMS ON THIS QUESTIONNAIRE, HOW DIFFICULT HAVE THESE PROBLEMS MADE IT FOR YOU TO DO YOUR WORK, TAKE CARE OF THINGS AT HOME, OR GET ALONG WITH OTHER PEOPLE: VERY DIFFICULT
GAD7 TOTAL SCORE: 13

## 2024-06-07 NOTE — TELEPHONE ENCOUNTER
Writer responded via [employer].  Lindy HAYNES, BSN, PHN, RN  [facility]  [phone]

## 2024-06-09 ENCOUNTER — HEALTH MAINTENANCE LETTER (OUTPATIENT)
Age: 39
End: 2024-06-09

## 2024-06-12 RX ORDER — LORAZEPAM 0.5 MG/1
TABLET ORAL
Qty: 8 TABLET | Refills: 2 | Status: SHIPPED | OUTPATIENT
Start: 2024-06-12

## 2024-07-03 ENCOUNTER — OFFICE VISIT (OUTPATIENT)
Dept: FAMILY MEDICINE | Facility: CLINIC | Age: 39
End: 2024-07-03
Payer: COMMERCIAL

## 2024-07-03 VITALS
HEIGHT: 68 IN | OXYGEN SATURATION: 99 % | TEMPERATURE: 97.9 F | RESPIRATION RATE: 18 BRPM | SYSTOLIC BLOOD PRESSURE: 107 MMHG | DIASTOLIC BLOOD PRESSURE: 75 MMHG | WEIGHT: 202.6 LBS | HEART RATE: 79 BPM | BODY MASS INDEX: 30.71 KG/M2

## 2024-07-03 DIAGNOSIS — F41.1 GENERALIZED ANXIETY DISORDER: ICD-10-CM

## 2024-07-03 DIAGNOSIS — Z00.00 ROUTINE GENERAL MEDICAL EXAMINATION AT A HEALTH CARE FACILITY: Primary | ICD-10-CM

## 2024-07-03 PROCEDURE — 99395 PREV VISIT EST AGE 18-39: CPT | Mod: 25 | Performed by: NURSE PRACTITIONER

## 2024-07-03 PROCEDURE — 99213 OFFICE O/P EST LOW 20 MIN: CPT | Mod: 25 | Performed by: NURSE PRACTITIONER

## 2024-07-03 SDOH — HEALTH STABILITY: PHYSICAL HEALTH: ON AVERAGE, HOW MANY DAYS PER WEEK DO YOU ENGAGE IN MODERATE TO STRENUOUS EXERCISE (LIKE A BRISK WALK)?: 3 DAYS

## 2024-07-03 ASSESSMENT — PATIENT HEALTH QUESTIONNAIRE - PHQ9
10. IF YOU CHECKED OFF ANY PROBLEMS, HOW DIFFICULT HAVE THESE PROBLEMS MADE IT FOR YOU TO DO YOUR WORK, TAKE CARE OF THINGS AT HOME, OR GET ALONG WITH OTHER PEOPLE: SOMEWHAT DIFFICULT
SUM OF ALL RESPONSES TO PHQ QUESTIONS 1-9: 10
SUM OF ALL RESPONSES TO PHQ QUESTIONS 1-9: 10

## 2024-07-03 ASSESSMENT — SOCIAL DETERMINANTS OF HEALTH (SDOH): HOW OFTEN DO YOU GET TOGETHER WITH FRIENDS OR RELATIVES?: ONCE A WEEK

## 2024-07-03 ASSESSMENT — PAIN SCALES - GENERAL: PAINLEVEL: NO PAIN (0)

## 2024-07-03 NOTE — PATIENT INSTRUCTIONS
Patient Education   Preventive Care Advice   This is general advice given by our system to help you stay healthy. However, your care team may have specific advice just for you. Please talk to your care team about your preventive care needs.  Nutrition  Eat 5 or more servings of fruits and vegetables each day.  Try wheat bread, brown rice and whole grain pasta (instead of white bread, rice, and pasta).  Get enough calcium and vitamin D. Check the label on foods and aim for 100% of the RDA (recommended daily allowance).  Lifestyle  Exercise at least 150 minutes each week  (30 minutes a day, 5 days a week).  Do muscle strengthening activities 2 days a week. These help control your weight and prevent disease.  No smoking.  Wear sunscreen to prevent skin cancer.  Have a dental exam and cleaning every 6 months.  Yearly exams  See your health care team every year to talk about:  Any changes in your health.  Any medicines your care team has prescribed.  Preventive care, family planning, and ways to prevent chronic diseases.  Shots (vaccines)   HPV shots (up to age 26), if you've never had them before.  Hepatitis B shots (up to age 59), if you've never had them before.  COVID-19 shot: Get this shot when it's due.  Flu shot: Get a flu shot every year.  Tetanus shot: Get a tetanus shot every 10 years.  Pneumococcal, hepatitis A, and RSV shots: Ask your care team if you need these based on your risk.  Shingles shot (for age 50 and up)  General health tests  Diabetes screening:  Starting at age 35, Get screened for diabetes at least every 3 years.  If you are younger than age 35, ask your care team if you should be screened for diabetes.  Cholesterol test: At age 39, start having a cholesterol test every 5 years, or more often if advised.  Bone density scan (DEXA): At age 50, ask your care team if you should have this scan for osteoporosis (brittle bones).  Hepatitis C: Get tested at least once in your life.  STIs (sexually  transmitted infections)  Before age 24: Ask your care team if you should be screened for STIs.  After age 24: Get screened for STIs if you're at risk. You are at risk for STIs (including HIV) if:  You are sexually active with more than one person.  You don't use condoms every time.  You or a partner was diagnosed with a sexually transmitted infection.  If you are at risk for HIV, ask about PrEP medicine to prevent HIV.  Get tested for HIV at least once in your life, whether you are at risk for HIV or not.  Cancer screening tests  Cervical cancer screening: If you have a cervix, begin getting regular cervical cancer screening tests starting at age 21.  Breast cancer scan (mammogram): If you've ever had breasts, begin having regular mammograms starting at age 40. This is a scan to check for breast cancer.  Colon cancer screening: It is important to start screening for colon cancer at age 45.  Have a colonoscopy test every 10 years (or more often if you're at risk) Or, ask your provider about stool tests like a FIT test every year or Cologuard test every 3 years.  To learn more about your testing options, visit:   .  For help making a decision, visit:   https://bit.ly/zv76011.  Prostate cancer screening test: If you have a prostate, ask your care team if a prostate cancer screening test (PSA) at age 55 is right for you.  Lung cancer screening: If you are a current or former smoker ages 50 to 80, ask your care team if ongoing lung cancer screenings are right for you.  For informational purposes only. Not to replace the advice of your health care provider. Copyright   2023 [facility]. All rights reserved. Clinically reviewed by the [facility]. [employer] 660749 - REV 01/24.  Learning About Stress  What is stress?     Stress is your body's response to a hard situation. Your body can have a physical, emotional, or mental response. Stress is a fact of life for most people, and it  affects everyone differently. What causes stress for you may not be stressful for someone else.  A lot of things can cause stress. You may feel stress when you go on a job interview, take a test, or run a race. This kind of short-term stress is normal and even useful. It can help you if you need to work hard or react quickly. For example, stress can help you finish an important job on time.  Long-term stress is caused by ongoing stressful situations or events. Examples of long-term stress include long-term health problems, ongoing problems at work, or conflicts in your family. Long-term stress can harm your health.  How does stress affect your health?  When you are stressed, your body responds as though you are in danger. It makes hormones that speed up your heart, make you breathe faster, and give you a burst of energy. This is called the fight-or-flight stress response. If the stress is over quickly, your body goes back to normal and no harm is done.  But if stress happens too often or lasts too long, it can have bad effects. Long-term stress can make you more likely to get sick, and it can make symptoms of some diseases worse. If you tense up when you are stressed, you may develop neck, shoulder, or low back pain. Stress is linked to high blood pressure and heart disease.  Stress also harms your emotional health. It can make you mazariegos, tense, or depressed. Your relationships may suffer, and you may not do well at work or school.  What can you do to manage stress?  You can try these things to help manage stress:   Do something active. Exercise or activity can help reduce stress. Walking is a great way to get started. Even everyday activities such as housecleaning or yard work can help.  Try yoga or pelon chi. These techniques combine exercise and meditation. You may need some training at first to learn them.  Do something you enjoy. For example, listen to music or go to a movie. Practice your hobby or do volunteer  "work.  Meditate. This can help you relax, because you are not worrying about what happened before or what may happen in the future.  Do guided imagery. Imagine yourself in any setting that helps you feel calm. You can use online videos, books, or a teacher to guide you.  Do breathing exercises. For example:  From a standing position, bend forward from the waist with your knees slightly bent. Let your arms dangle close to the floor.  Breathe in slowly and deeply as you return to a standing position. Roll up slowly and lift your head last.  Hold your breath for just a few seconds in the standing position.  Breathe out slowly and bend forward from the waist.  Let your feelings out. Talk, laugh, cry, and express anger when you need to. Talking with supportive friends or family, a counselor, or a isaac leader about your feelings is a healthy way to relieve stress. Avoid discussing your feelings with people who make you feel worse.  Write. It may help to write about things that are bothering you. This helps you find out how much stress you feel and what is causing it. When you know this, you can find better ways to cope.  What can you do to prevent stress?  You might try some of these things to help prevent stress:  Manage your time. This helps you find time to do the things you want and need to do.  Get enough sleep. Your body recovers from the stresses of the day while you are sleeping.  Get support. Your family, friends, and community can make a difference in how you experience stress.  Limit your news feed. Avoid or limit time on social media or news that may make you feel stressed.  Do something active. Exercise or activity can help reduce stress. Walking is a great way to get started.  Where can you learn more?  Go to https://www.healthwise.net/patiented  Enter N032 in the search box to learn more about \"Learning About Stress.\"  Current as of: October 24, 2023               Content Version: 14.0    4226-0511 " [employer].   Care instructions adapted under license by your healthcare professional. If you have questions about a medical condition or this instruction, always ask your healthcare professional. [employer] disclaims any warranty or liability for your use of this information.      Learning About Depression Screening  What is depression screening?  Depression screening is a way to see if you have depression symptoms. It may be done by a doctor or counselor. It's often part of a routine checkup. That's because your mental health is just as important as your physical health.  Depression is a mental health condition that affects how you feel, think, and act. You may:  Have less energy.  Lose interest in your daily activities.  Feel sad and grouchy for a long time.  Depression is very common. It affects people of all ages.  Many things can lead to depression. Some people become depressed after they have a stroke or find out they have a major illness like cancer or heart disease. The death of a loved one or a breakup may lead to depression. It can run in families. Most experts believe that a combination of inherited genes and stressful life events can cause it.  What happens during screening?  You may be asked to fill out a form about your depression symptoms. You and the doctor will discuss your answers. The doctor may ask you more questions to learn more about how you think, act, and feel.  What happens after screening?  If you have symptoms of depression, your doctor will talk to you about your options.  Doctors usually treat depression with medicines or counseling. Often, combining the two works best. Many people don't get help because they think that they'll get over the depression on their own. But people with depression may not get better unless they get treatment.  The cause of depression is not well understood. There may be many factors involved. But if you have depression, it's not  "your fault.  A serious symptom of depression is thinking about death or suicide. If you or someone you care about talks about this or about feeling hopeless, get help right away.  It's important to know that depression can be treated. Medicine, counseling, and self-care may help.  Where can you learn more?  Go to https://www.[employer].net/patiented  Enter T185 in the search box to learn more about \"Learning About Depression Screening.\"  Current as of: June 24, 2023               Content Version: 14.0    9860-9868 [employer].   Care instructions adapted under license by your healthcare professional. If you have questions about a medical condition or this instruction, always ask your healthcare professional. [employer] disclaims any warranty or liability for your use of this information.         "

## 2024-07-03 NOTE — PROGRESS NOTES
"Preventive Care Visit  [facility]  Josefina Aquino, NP, Nurse Practitioner - Family  Jul 3, 2024      Assessment & Plan     (Z00.00) Routine general medical examination at a health care facility  (primary encounter diagnosis)  Comment:   Plan:     (F41.1) Generalized anxiety disorder  Comment: improving  Plan: Discussed the option of increasing her Lexapro to 20 mg.  She prefers to continue to monitor and follow up with her therapist for now, but will let me know if she would like to increase in the future.             BMI  Estimated body mass index is 31.24 kg/m  as calculated from the following:    Height as of this encounter: 1.715 m (5' 7.52\").    Weight as of this encounter: 91.9 kg (202 lb 9.6 oz).       Counseling  Appropriate preventive services were discussed with this patient, including applicable screening as appropriate for fall prevention, nutrition, physical activity, Tobacco-use cessation, weight loss and cognition.  Checklist reviewing preventive services available has been given to the patient.  Reviewed patient's diet, addressing concerns and/or questions.   She is at risk for lack of exercise and has been provided with information to increase physical activity for the benefit of her well-being.   The patient's PHQ-9 score is consistent with moderate depression. She was provided with information regarding depression.           Maxi Kennedy is a 38 year old, presenting for the following:  Physical        7/3/2024     2:23 PM   Additional Questions   Roomed by mary lepe   Accompanied by self        Health Care Directive  Patient does not have a Health Care Directive or Living Will: Discussed advance care planning with patient; information given to patient to review.    HPI  Her anxiety has been worse recently.  She feels like her anxiety might be related to some dissociative episodes recently and worrying about if this might happen again.  She does see her " therapist regularly.  Her dose of Lexapro was increased to 15 mg in April.             7/3/2024   General Health   How would you rate your overall physical health? Good   Feel stress (tense, anxious, or unable to sleep) Rather much      (!) STRESS CONCERN      7/3/2024   Nutrition   Three or more servings of calcium each day? Yes   Diet: Regular (no restrictions)   How many servings of fruit and vegetables per day? 4 or more   How many sweetened beverages each day? 0-1            7/3/2024   Exercise   Days per week of moderate/strenous exercise 3 days            7/3/2024   Social Factors   Frequency of gathering with friends or relatives Once a week   Worry food won't last until get money to buy more No   Food not last or not have enough money for food? No   Do you have housing? (Housing is defined as stable permanent housing and does not include staying ouside in a car, in a tent, in an abandoned building, in an overnight shelter, or couch-surfing.) Yes   Are you worried about losing your housing? No   Lack of transportation? No   Unable to get utilities (heat,electricity)? No            7/3/2024   Dental   Dentist two times every year? Yes            7/3/2024   TB Screening   Were you born outside of the [place]? No          Today's PHQ-9 Score:       7/3/2024     2:11 PM   PHQ-9 SCORE   PHQ-9 Total Score MyChart 10 (Moderate depression)   PHQ-9 Total Score 10         7/3/2024   Substance Use   Alcohol more than 3/day or more than 7/wk No   Do you use any other substances recreationally? No        Social History     Tobacco Use    Smoking status: Never    Smokeless tobacco: Never    Tobacco comments:     none   Vaping Use    Vaping status: Never Used   Substance Use Topics    Alcohol use: Yes     Comment: Occasionally    Drug use: No     Comment: none                  7/3/2024   STI Screening   New sexual partner(s) since last STI/HIV test? No        History of abnormal Pap smear: No - age 30- 64 PAP with HPV every 5  "years recommended        Latest Ref Rng & Units 3/21/2023     3:44 PM 1/10/2022    11:39 AM 10/14/2020     2:21 PM   PAP / HPV   PAP  Negative for Intraepithelial Lesion or Malignancy (NILM)  Negative for Intraepithelial Lesion or Malignancy (NILM)     HPV 16 DNA Negative Negative  Negative  Negative    HPV 18 DNA Negative Negative  Negative  Negative    Other HR HPV Negative Negative  Positive  Positive            7/3/2024   Contraception/Family Planning   Questions about contraception or family planning No           Reviewed and updated as needed this visit by Provider                             Objective    Exam  /75 (BP Location: Right arm, Patient Position: Sitting, Cuff Size: Adult Regular)   Pulse 79   Temp 97.9  F (36.6  C) (Temporal)   Resp 18   Ht 1.715 m (5' 7.52\")   Wt 91.9 kg (202 lb 9.6 oz)   LMP 06/25/2024 (Exact Date)   SpO2 99%   BMI 31.24 kg/m     Estimated body mass index is 31.24 kg/m  as calculated from the following:    Height as of this encounter: 1.715 m (5' 7.52\").    Weight as of this encounter: 91.9 kg (202 lb 9.6 oz).    Physical Exam  GENERAL: alert and no distress  EYES: Eyes grossly normal to inspection, PERRL and conjunctivae and sclerae normal  HENT: ear canals and TM's normal, nose and mouth without ulcers or lesions  NECK: no adenopathy, no asymmetry, masses, or scars  RESP: lungs clear to auscultation - no rales, rhonchi or wheezes  BREAST: normal without masses, tenderness or nipple discharge and no palpable axillary masses or adenopathy  CV: regular rate and rhythm, normal S1 S2, no S3 or S4, no murmur, click or rub, no peripheral edema  ABDOMEN: soft, nontender, no hepatosplenomegaly, no masses and bowel sounds normal  MS: no gross musculoskeletal defects noted, no edema  SKIN: no suspicious lesions or rashes  NEURO: Normal strength and tone, mentation intact and speech normal  PSYCH: mentation appears normal, affect normal/bright        Signed Electronically by: " Josefina Aquino NP    Answers submitted by the patient for this visit:  Patient Health Questionnaire (Submitted on 7/3/2024)  If you checked off any problems, how difficult have these problems made it for you to do your work, take care of things at home, or get along with other people?: Somewhat difficult  PHQ9 TOTAL SCORE: 10

## 2024-09-03 ENCOUNTER — E-VISIT (OUTPATIENT)
Dept: FAMILY MEDICINE | Facility: CLINIC | Age: 39
End: 2024-09-03
Payer: COMMERCIAL

## 2024-09-03 DIAGNOSIS — N39.0 ACUTE UTI (URINARY TRACT INFECTION): Primary | ICD-10-CM

## 2024-09-03 PROCEDURE — 99421 OL DIG E/M SVC 5-10 MIN: CPT | Performed by: NURSE PRACTITIONER

## 2024-09-03 RX ORDER — NITROFURANTOIN 25; 75 MG/1; MG/1
100 CAPSULE ORAL 2 TIMES DAILY
Qty: 10 CAPSULE | Refills: 0 | Status: SHIPPED | OUTPATIENT
Start: 2024-09-03 | End: 2024-09-08

## 2024-09-03 NOTE — PATIENT INSTRUCTIONS
Dear Brent Walters    After reviewing your responses, I've been able to diagnose you with a urinary tract infection, which is a common infection of the bladder with bacteria.  This is not a sexually transmitted infection, though urinating immediately after intercourse can help prevent infections.  Drinking lots of fluids is also helpful to clear your current infection and prevent the next one.      I have sent a prescription for antibiotics to your pharmacy to treat this infection.    It is important that you take all of your prescribed medication even if your symptoms are improving after a few doses.  Taking all of your medicine helps prevent the symptoms from returning.     If your symptoms worsen, you develop pain in your back or stomach, develop fevers, or are not improving in 5 days, please contact your primary care provider for an appointment or visit any of our convenient Walk-in or Urgent Care Centers to be seen, which can be found on our website here.    Thanks again for choosing us as your health care partner,    Josefina Aquino NP

## 2024-09-11 ENCOUNTER — E-VISIT (OUTPATIENT)
Dept: FAMILY MEDICINE | Facility: CLINIC | Age: 39
End: 2024-09-11
Payer: COMMERCIAL

## 2024-09-11 ENCOUNTER — MYC REFILL (OUTPATIENT)
Dept: FAMILY MEDICINE | Facility: CLINIC | Age: 39
End: 2024-09-11

## 2024-09-11 DIAGNOSIS — N39.0 ACUTE UTI (URINARY TRACT INFECTION): Primary | ICD-10-CM

## 2024-09-11 DIAGNOSIS — N39.0 ACUTE UTI (URINARY TRACT INFECTION): ICD-10-CM

## 2024-09-11 PROCEDURE — 99421 OL DIG E/M SVC 5-10 MIN: CPT | Performed by: NURSE PRACTITIONER

## 2024-09-11 RX ORDER — NITROFURANTOIN 25; 75 MG/1; MG/1
100 CAPSULE ORAL 2 TIMES DAILY
Qty: 10 CAPSULE | Refills: 0 | OUTPATIENT
Start: 2024-09-11

## 2024-09-11 RX ORDER — NITROFURANTOIN 25; 75 MG/1; MG/1
100 CAPSULE ORAL 2 TIMES DAILY
Qty: 10 CAPSULE | Refills: 0 | Status: SHIPPED | OUTPATIENT
Start: 2024-09-11 | End: 2024-09-16

## 2024-10-02 ENCOUNTER — OFFICE VISIT (OUTPATIENT)
Dept: FAMILY MEDICINE | Facility: CLINIC | Age: 39
End: 2024-10-02
Payer: COMMERCIAL

## 2024-10-02 VITALS
WEIGHT: 206.5 LBS | RESPIRATION RATE: 16 BRPM | DIASTOLIC BLOOD PRESSURE: 66 MMHG | BODY MASS INDEX: 31.3 KG/M2 | TEMPERATURE: 97.1 F | SYSTOLIC BLOOD PRESSURE: 110 MMHG | HEIGHT: 68 IN | OXYGEN SATURATION: 96 % | HEART RATE: 95 BPM

## 2024-10-02 DIAGNOSIS — Z11.4 ENCOUNTER FOR SCREENING FOR HIV: ICD-10-CM

## 2024-10-02 DIAGNOSIS — Z11.59 NEED FOR HEPATITIS C SCREENING TEST: ICD-10-CM

## 2024-10-02 DIAGNOSIS — Z23 ENCOUNTER FOR IMMUNIZATION: ICD-10-CM

## 2024-10-02 DIAGNOSIS — Z11.3 ROUTINE SCREENING FOR STI (SEXUALLY TRANSMITTED INFECTION): Primary | ICD-10-CM

## 2024-10-02 LAB — T PALLIDUM AB SER QL: NONREACTIVE

## 2024-10-02 PROCEDURE — 90471 IMMUNIZATION ADMIN: CPT | Performed by: STUDENT IN AN ORGANIZED HEALTH CARE EDUCATION/TRAINING PROGRAM

## 2024-10-02 PROCEDURE — 99213 OFFICE O/P EST LOW 20 MIN: CPT | Mod: 25 | Performed by: STUDENT IN AN ORGANIZED HEALTH CARE EDUCATION/TRAINING PROGRAM

## 2024-10-02 PROCEDURE — 86803 HEPATITIS C AB TEST: CPT | Performed by: STUDENT IN AN ORGANIZED HEALTH CARE EDUCATION/TRAINING PROGRAM

## 2024-10-02 PROCEDURE — 87389 HIV-1 AG W/HIV-1&-2 AB AG IA: CPT | Performed by: STUDENT IN AN ORGANIZED HEALTH CARE EDUCATION/TRAINING PROGRAM

## 2024-10-02 PROCEDURE — 87491 CHLMYD TRACH DNA AMP PROBE: CPT | Performed by: STUDENT IN AN ORGANIZED HEALTH CARE EDUCATION/TRAINING PROGRAM

## 2024-10-02 PROCEDURE — 86780 TREPONEMA PALLIDUM: CPT | Performed by: STUDENT IN AN ORGANIZED HEALTH CARE EDUCATION/TRAINING PROGRAM

## 2024-10-02 PROCEDURE — 36415 COLL VENOUS BLD VENIPUNCTURE: CPT | Performed by: STUDENT IN AN ORGANIZED HEALTH CARE EDUCATION/TRAINING PROGRAM

## 2024-10-02 PROCEDURE — 90656 IIV3 VACC NO PRSV 0.5 ML IM: CPT | Performed by: STUDENT IN AN ORGANIZED HEALTH CARE EDUCATION/TRAINING PROGRAM

## 2024-10-02 PROCEDURE — 87591 N.GONORRHOEAE DNA AMP PROB: CPT | Performed by: STUDENT IN AN ORGANIZED HEALTH CARE EDUCATION/TRAINING PROGRAM

## 2024-10-02 SDOH — HEALTH STABILITY: PHYSICAL HEALTH: ON AVERAGE, HOW MANY DAYS PER WEEK DO YOU ENGAGE IN MODERATE TO STRENUOUS EXERCISE (LIKE A BRISK WALK)?: 3 DAYS

## 2024-10-02 SDOH — HEALTH STABILITY: PHYSICAL HEALTH: ON AVERAGE, HOW MANY MINUTES DO YOU ENGAGE IN EXERCISE AT THIS LEVEL?: 30 MIN

## 2024-10-02 ASSESSMENT — PATIENT HEALTH QUESTIONNAIRE - PHQ9
SUM OF ALL RESPONSES TO PHQ QUESTIONS 1-9: 9
SUM OF ALL RESPONSES TO PHQ QUESTIONS 1-9: 9
10. IF YOU CHECKED OFF ANY PROBLEMS, HOW DIFFICULT HAVE THESE PROBLEMS MADE IT FOR YOU TO DO YOUR WORK, TAKE CARE OF THINGS AT HOME, OR GET ALONG WITH OTHER PEOPLE: SOMEWHAT DIFFICULT

## 2024-10-02 ASSESSMENT — PAIN SCALES - GENERAL: PAINLEVEL: NO PAIN (0)

## 2024-10-02 ASSESSMENT — SOCIAL DETERMINANTS OF HEALTH (SDOH): HOW OFTEN DO YOU GET TOGETHER WITH FRIENDS OR RELATIVES?: ONCE A WEEK

## 2024-10-02 NOTE — PROGRESS NOTES
"  Assessment & Plan     Routine screening for STI (sexually transmitted infection)  Need for hepatitis C screening test  Encounter for screening for HIV  Offered reassurance to patient that lesion of right inner thigh crease is likely an ingrown hair or other benign lesion - no concern for herpes. No concerning lesions of right labia. Full STI panel ordered. I will message patient with results on MyChart.    - Treponema Abs w Reflex to RPR and Titer; Future  - Chlamydia trachomatis/Neisseria gonorrhoeae by PCR  - Hepatitis C Screen Reflex to HCV RNA Quant and Genotype; Future  - HIV Antigen Antibody Combo Cascade; Future    Encounter for immunization  - INFLUENZA VACCINE,SPLIT VIRUS,TRIVALENT,PF(FLUZONE)      Maxi Kennedy is a 38 year old, presenting for the following health issues:  Imm/Inj (Pt want Flu and Covid booster) and STD (Test for STD)        10/2/2024     2:53 PM   Additional Questions   Roomed by Gumaro HOYOS MA   Accompanied by Self     History of Present Illness       Reason for visit:  STD testing and Flu and Covid vacc   She is taking medications regularly.     She recently had a new partner and wants new STI panel. She has had intermittent blister like lesions of the labia and right thigh crease that are not painful. Lesion don't itch or bleed. Noticed them recently. Otherwise, she is asymptomatic vaginal discharge, pelvic/abdominal pain, rash, fever or chills.                 Objective    /66 (BP Location: Right arm, Patient Position: Sitting, Cuff Size: Adult Large)   Pulse 95   Temp 97.1  F (36.2  C) (Temporal)   Resp 16   Ht 1.727 m (5' 8\")   Wt 93.7 kg (206 lb 8 oz)   LMP 09/13/2024 (Approximate)   SpO2 96%   BMI 31.40 kg/m    Body mass index is 31.4 kg/m .  Physical Exam   GENERAL: healthy, alert and no distress  EYES: Eyes grossly normal to inspection, EOM intact and conjunctivae normal  RESP: breathing comfortably on room air  : External genitalia, vulva and vagina are pink " without lesion or excoriation. No abnormal lesions in area of concern. Small papule of right thigh crease appear to be ingrown hair but not a herpetic lesion. Urethral meatus is midline without discharge. Vaginal walls are rugated with no discharge or erythema.   PSYCH: mentation appears normal, affect normal/bright              Signed Electronically by: Shamir Fletcher PA-C    Answers submitted by the patient for this visit:  Patient Health Questionnaire (Submitted on 10/2/2024)  If you checked off any problems, how difficult have these problems made it for you to do your work, take care of things at home, or get along with other people?: Somewhat difficult  PHQ9 TOTAL SCORE: 9

## 2024-10-03 LAB
C TRACH DNA SPEC QL PROBE+SIG AMP: NEGATIVE
HCV AB SERPL QL IA: NONREACTIVE
HIV 1+2 AB+HIV1 P24 AG SERPL QL IA: NONREACTIVE
N GONORRHOEA DNA SPEC QL NAA+PROBE: NEGATIVE

## 2024-10-04 NOTE — ED TRIAGE NOTES
"Pt presents with feeling \"off.\" Pt was seen on Friday for same symptoms and was told it was a reaction to taking an edible. Pt states she feels \"really high,\" but has taken no edibles or medications. Feels foggy, sluggish, not sharp, feelings of paranoia. People talking in the lobby sound like they are \"on tv,\" to pt.     Triage Assessment (Adult)       Row Name 06/02/24 130          Triage Assessment    Airway WDL WDL        Respiratory WDL    Respiratory WDL WDL        Skin Circulation/Temperature WDL    Skin Circulation/Temperature WDL WDL        Cardiac WDL    Cardiac WDL WDL        Peripheral/Neurovascular WDL    Peripheral Neurovascular WDL WDL        Cognitive/Neuro/Behavioral WDL    Cognitive/Neuro/Behavioral WDL WDL                     " 2.22

## 2024-10-18 ENCOUNTER — IMMUNIZATION (OUTPATIENT)
Dept: FAMILY MEDICINE | Facility: CLINIC | Age: 39
End: 2024-10-18
Payer: COMMERCIAL

## 2024-10-18 PROCEDURE — 91320 SARSCV2 VAC 30MCG TRS-SUC IM: CPT

## 2024-10-18 PROCEDURE — 90480 ADMN SARSCOV2 VAC 1/ONLY CMP: CPT

## 2024-12-28 ENCOUNTER — HEALTH MAINTENANCE LETTER (OUTPATIENT)
Age: 39
End: 2024-12-28

## 2025-01-03 ENCOUNTER — MYC MEDICAL ADVICE (OUTPATIENT)
Dept: FAMILY MEDICINE | Facility: CLINIC | Age: 40
End: 2025-01-03
Payer: COMMERCIAL

## 2025-01-03 DIAGNOSIS — R41.840 DIFFICULTY CONCENTRATING: Primary | ICD-10-CM

## 2025-01-08 NOTE — TELEPHONE ENCOUNTER
Roro - see MyChart, referral pended below if in agreement.    JANICE Tim, BSN, PHN, AMB-BC (she/her)  [facility] RN

## 2025-01-10 ENCOUNTER — ANCILLARY PROCEDURE (OUTPATIENT)
Dept: MAMMOGRAPHY | Facility: HOSPITAL | Age: 40
End: 2025-01-10
Attending: NURSE PRACTITIONER
Payer: COMMERCIAL

## 2025-01-10 DIAGNOSIS — Z12.31 VISIT FOR SCREENING MAMMOGRAM: ICD-10-CM

## 2025-01-10 PROCEDURE — 77067 SCR MAMMO BI INCL CAD: CPT

## 2025-01-10 PROCEDURE — 77063 BREAST TOMOSYNTHESIS BI: CPT

## 2025-01-20 ENCOUNTER — VIRTUAL VISIT (OUTPATIENT)
Dept: FAMILY MEDICINE | Facility: CLINIC | Age: 40
End: 2025-01-20
Payer: COMMERCIAL

## 2025-01-20 DIAGNOSIS — F33.1 MAJOR DEPRESSIVE DISORDER, RECURRENT EPISODE, MODERATE (H): ICD-10-CM

## 2025-01-20 DIAGNOSIS — N95.1 PERIMENOPAUSAL: ICD-10-CM

## 2025-01-20 DIAGNOSIS — G47.11 IDIOPATHIC HYPERSOMNIA: Primary | ICD-10-CM

## 2025-01-20 PROCEDURE — 98006 SYNCH AUDIO-VIDEO EST MOD 30: CPT | Performed by: NURSE PRACTITIONER

## 2025-01-20 PROCEDURE — 96127 BRIEF EMOTIONAL/BEHAV ASSMT: CPT | Mod: 95 | Performed by: NURSE PRACTITIONER

## 2025-01-20 RX ORDER — MODAFINIL 200 MG/1
TABLET ORAL
Qty: 30 TABLET | Refills: 0 | Status: SHIPPED | OUTPATIENT
Start: 2025-01-20

## 2025-01-20 ASSESSMENT — ENCOUNTER SYMPTOMS: FATIGUE: 1

## 2025-01-20 NOTE — PROGRESS NOTES
"Brent is a 39 year old who is being evaluated via a billable video visit.    How would you like to obtain your AVS? MyChart  If the video visit is dropped, the invitation should be resent by: Text to cell phone: [phone]  Will anyone else be joining your video visit? No      Assessment & Plan     (G47.11) Idiopathic hypersomnia  (primary encounter diagnosis)  Comment:   Plan: modafinil (PROVIGIL) 200 MG tablet        Will try modafinil.  Discussed the use and indication of this medication as well as potential side effects.  She will do a     (N95.1) Perimenopausal  Comment:   Plan: Discussed options such as OCP.  She prefers to wait at this time.     (F33.1) Major depressive disorder, recurrent episode, moderate (H)  Comment: stable  Plan: The current medical regimen is effective;  continue present plan and medications.     The longitudinal plan of care for the diagnosis(es)/condition(s) as documented were addressed during this visit. Due to the added complexity in care, I will continue to support Brent in the subsequent management and with ongoing continuity of care.      I spent a total of 30 minutes on the day of the visit.   Time spent by me today doing chart review, history and exam, documentation and further activities per the note          BMI  Estimated body mass index is 31.4 kg/m  as calculated from the following:    Height as of 10/2/24: 1.727 m (5' 8\").    Weight as of 10/2/24: 93.7 kg (206 lb 8 oz).             Subjective   Brent is a 39 year old, presenting for the following health issues:  HRT, Fatigue, and A.D.H.D      1/20/2025     4:12 PM   Additional Questions   Roomed by Анна Parikh     Fatigue  Associated symptoms include fatigue.   A.D.H.D  Associated symptoms include fatigue.   History of Present Illness       Reason for visit:  To discuss, premenopause, referrals and ongoing fatigue   She is taking medications regularly.     Her periods have started to change - not as regular, emotional " PMS have worsened.  She recently started primrose 1-2 months ago, which is helping.    She did have a recent ADHD evaluation and they were unable to determine if she has ADHD or if symptoms were due to anxiety.  She is chronically fatigued for the past 15 years.  She has had several labs done in the past, all normal.  She has gained 40# in the past few months because of her low energy.  She does use her CPAP regularly.  She did try a friend's Adderall and felt like it was the first time she actually did not feel tired and had energy.                Objective           Vitals:  No vitals were obtained today due to virtual visit.    Physical Exam   GENERAL: alert and no distress  EYES: Eyes grossly normal to inspection.  No discharge or erythema, or obvious scleral/conjunctival abnormalities.  RESP: No audible wheeze, cough, or visible cyanosis.    SKIN: Visible skin clear. No significant rash, abnormal pigmentation or lesions.  NEURO: Cranial nerves grossly intact.  Mentation and speech appropriate for age.  PSYCH: Appropriate affect, tone, and pace of words          Video-Visit Details    Type of service:  Video Visit   Originating Location (pt. Location): Home    Distant Location (provider location):  On-site  Platform used for Video Visit: Nidhi  Signed Electronically by: Josefina Aquino NP

## 2025-02-12 ENCOUNTER — E-VISIT (OUTPATIENT)
Dept: FAMILY MEDICINE | Facility: CLINIC | Age: 40
End: 2025-02-12
Payer: COMMERCIAL

## 2025-02-12 DIAGNOSIS — F41.1 GENERALIZED ANXIETY DISORDER: ICD-10-CM

## 2025-02-12 DIAGNOSIS — R53.82 CHRONIC FATIGUE: ICD-10-CM

## 2025-02-12 DIAGNOSIS — F33.1 MAJOR DEPRESSIVE DISORDER, RECURRENT EPISODE, MODERATE (H): Primary | ICD-10-CM

## 2025-02-12 PROCEDURE — 99422 OL DIG E/M SVC 11-20 MIN: CPT | Performed by: NURSE PRACTITIONER

## 2025-02-18 RX ORDER — BUPROPION HYDROCHLORIDE 150 MG/1
150 TABLET ORAL EVERY MORNING
Qty: 30 TABLET | Refills: 1 | Status: SHIPPED | OUTPATIENT
Start: 2025-02-18

## 2025-03-18 ENCOUNTER — OFFICE VISIT (OUTPATIENT)
Dept: INTERNAL MEDICINE | Facility: CLINIC | Age: 40
End: 2025-03-18
Payer: COMMERCIAL

## 2025-03-18 VITALS
DIASTOLIC BLOOD PRESSURE: 84 MMHG | HEIGHT: 68 IN | OXYGEN SATURATION: 100 % | RESPIRATION RATE: 17 BRPM | TEMPERATURE: 97.6 F | WEIGHT: 209.5 LBS | HEART RATE: 74 BPM | SYSTOLIC BLOOD PRESSURE: 116 MMHG | BODY MASS INDEX: 31.75 KG/M2

## 2025-03-18 DIAGNOSIS — Z20.2 STD EXPOSURE: Primary | ICD-10-CM

## 2025-03-18 PROBLEM — A49.1 GBS (GROUP B STREPTOCOCCUS) INFECTION: Status: RESOLVED | Noted: 2019-10-24 | Resolved: 2025-03-18

## 2025-03-18 PROBLEM — O24.419 GESTATIONAL DIABETES: Status: RESOLVED | Noted: 2019-10-12 | Resolved: 2025-03-18

## 2025-03-18 PROCEDURE — 36415 COLL VENOUS BLD VENIPUNCTURE: CPT | Performed by: INTERNAL MEDICINE

## 2025-03-18 PROCEDURE — 3079F DIAST BP 80-89 MM HG: CPT | Performed by: INTERNAL MEDICINE

## 2025-03-18 PROCEDURE — 3074F SYST BP LT 130 MM HG: CPT | Performed by: INTERNAL MEDICINE

## 2025-03-18 PROCEDURE — 87491 CHLMYD TRACH DNA AMP PROBE: CPT | Performed by: INTERNAL MEDICINE

## 2025-03-18 PROCEDURE — 86780 TREPONEMA PALLIDUM: CPT | Performed by: INTERNAL MEDICINE

## 2025-03-18 PROCEDURE — 87591 N.GONORRHOEAE DNA AMP PROB: CPT | Performed by: INTERNAL MEDICINE

## 2025-03-18 PROCEDURE — 87389 HIV-1 AG W/HIV-1&-2 AB AG IA: CPT | Performed by: INTERNAL MEDICINE

## 2025-03-18 PROCEDURE — 99213 OFFICE O/P EST LOW 20 MIN: CPT | Performed by: INTERNAL MEDICINE

## 2025-03-18 ASSESSMENT — PATIENT HEALTH QUESTIONNAIRE - PHQ9
10. IF YOU CHECKED OFF ANY PROBLEMS, HOW DIFFICULT HAVE THESE PROBLEMS MADE IT FOR YOU TO DO YOUR WORK, TAKE CARE OF THINGS AT HOME, OR GET ALONG WITH OTHER PEOPLE: SOMEWHAT DIFFICULT
SUM OF ALL RESPONSES TO PHQ QUESTIONS 1-9: 7
SUM OF ALL RESPONSES TO PHQ QUESTIONS 1-9: 7

## 2025-03-18 NOTE — PROGRESS NOTES
" ASSESSMENT AND PLAN:    1. STD exposure   .ordered.      CHIEF COMPLAINT:  STD exposure    HISTORY OF PRESENT ILLNESS:  Brent Walters is a 39 year old female. She has no symptoms, and about to enter a new relationship, wants testing    Past Medical History:   Diagnosis Date    Central serous chorioretinopathy of both eyes     Cervical high risk HPV (human papillomavirus) test positive 08/16/2017 08/16/17,08/14/18, 10/14/20    Depressive disorder     on meds    Gestational diabetes     Hyperemesis gravidarum 04/09/2019    LSIL (low grade squamous intraepithelial lesion) on Pap smear 11/2011    colp - QUIQUE I    Spinal headache 10/16/2019     History   Smoking Status    Never   Smokeless Tobacco    Never     Family History   Problem Relation Age of Onset    Hyperlipidemia Mother     Depression Mother     Skin Cancer Mother     Hypertension Father     Depression Father     Anxiety Disorder Father     Cerebrovascular Disease Maternal Grandmother     Cancer Paternal Grandmother         pancreatic    Other Cancer Paternal Grandmother     Asthma No family hx of     C.A.D. No family hx of     Breast Cancer No family hx of     Cancer - colorectal No family hx of     Diabetes No family hx of     Glaucoma No family hx of     Macular Degeneration No family hx of      Past Surgical History:   Procedure Laterality Date    BLOOD PATCH N/A 10/16/2019    Procedure: INJECTION, BLOOD PATCH, EPIDURAL;  Surgeon: Darvin Karimi MD;  Location: [facility] OR    D & C      MAMMOPLASTY REDUCTION      PANNICULECTOMY       Allergies   Allergen Reactions    Sulfa Antibiotics Swelling     Sulfas in eye drops, caused redness, irritation, swelling to eyes  PN: LW Reaction: rash       VITALS:  Vitals:    03/18/25 1518   BP: 116/84   BP Location: Left arm   Patient Position: Sitting   Cuff Size: Adult Regular   Pulse: 74   Resp: 17   Temp: 97.6  F (36.4  C)   TempSrc: Oral   SpO2: 100%   Weight: 95 kg (209 lb 8 oz)   Height: 1.727 m (5' 8\") " "    Estimated body mass index is 31.85 kg/m  as calculated from the following:    Height as of this encounter: 1.727 m (5' 8\").    Weight as of this encounter: 95 kg (209 lb 8 oz).  Wt Readings from Last 3 Encounters:   03/18/25 95 kg (209 lb 8 oz)   10/02/24 93.7 kg (206 lb 8 oz)   07/03/24 91.9 kg (202 lb 9.6 oz)     PHYSICAL EXAM:  Constitutional:  In NAD, alert and oriented  Psychiatric:  Mood and behavior are appropriate, thinking is clear.     DECISION TO OBTAIN OLD RECORDS AND/OR OBTAIN HISTORY FROM SOMEONE OTHER THAN PATIENT, AND/OR ACCESSING CARE EVERYWHERE):  1  0     REVIEW AND SUMMARIZATION OF OLD RECORDS, AND/OR OBTAINING HISTORY FROM SOMEONE OTHER THAN PATIENT, AND/OR DISCUSSION OF CASE WITH ANOTHER HEALTH CARE PROVIDER:  2 reviewed past health notes    REVIEW AND/OR ORDER OF OF CLINICAL LAB TESTS: 1  0.    REVIEW AND/OR ORDER OF RADIOLOGY TESTS: 1 0.    REVIEW AND/OR ORDER OF MEDICAL TESTS (EKG/ECHO/COLONOSCOPY/EGD): 1  0    INDEPENDENT  VISUALIZATION OF IMAGE, TRACING, OR SPECIMEN ITSELF (2 EACH):  0     TOTAL: 2    Current Outpatient Medications   Medication Sig Dispense Refill    buPROPion (WELLBUTRIN XL) 150 MG 24 hr tablet Take 1 tablet (150 mg) by mouth every morning. 30 tablet 1    Cholecalciferol (VITAMIN D PO)       escitalopram (LEXAPRO) 10 MG tablet Take 1.5 tablets (15 mg) by mouth daily 135 tablet 3    ketoconazole (NIZORAL) 2 % external shampoo Apply topically to the scalp, leave on for 5min prior to rinsing. Use daily for flares, then 1x weekly for maintenance. 120 mL 1    LORazepam (ATIVAN) 0.5 MG tablet Take one tablet as needed for anxiety, not more than twice weekly 8 tablet 2    tiZANidine (ZANAFLEX) 4 MG tablet Take 1 tablet (4 mg) by mouth 3 times daily as needed for muscle spasms 30 tablet 12     Darvin Cook MD  Internal Medicine  [facility]   "

## 2025-03-19 ENCOUNTER — MYC MEDICAL ADVICE (OUTPATIENT)
Dept: FAMILY MEDICINE | Facility: CLINIC | Age: 40
End: 2025-03-19
Payer: COMMERCIAL

## 2025-03-19 ENCOUNTER — TELEPHONE (OUTPATIENT)
Dept: FAMILY MEDICINE | Facility: CLINIC | Age: 40
End: 2025-03-19
Payer: COMMERCIAL

## 2025-03-19 DIAGNOSIS — A54.9 GONORRHEA: Primary | ICD-10-CM

## 2025-03-19 LAB
C TRACH DNA SPEC QL PROBE+SIG AMP: NEGATIVE
HCV AB SERPL QL IA: NONREACTIVE
HIV 1+2 AB+HIV1 P24 AG SERPL QL IA: NONREACTIVE
N GONORRHOEA DNA SPEC QL NAA+PROBE: POSITIVE
SPECIMEN TYPE: ABNORMAL
T PALLIDUM AB SER QL: NONREACTIVE

## 2025-03-19 RX ORDER — CEFTRIAXONE SODIUM 1 G
1 VIAL (EA) INJECTION ONCE
Status: COMPLETED | OUTPATIENT
Start: 2025-03-19 | End: 2025-03-20

## 2025-03-19 NOTE — TELEPHONE ENCOUNTER
Patient contacting clinic for test results 3/18.       Hepatitis C results still pending.       Final results for chlamydia, HIV, and Treponema resulted.      Routing to provider review.        OK to leave voicemail per patient.

## 2025-03-20 ENCOUNTER — ALLIED HEALTH/NURSE VISIT (OUTPATIENT)
Dept: FAMILY MEDICINE | Facility: CLINIC | Age: 40
End: 2025-03-20
Payer: COMMERCIAL

## 2025-03-20 DIAGNOSIS — A54.9 GONORRHEA: Primary | ICD-10-CM

## 2025-03-20 DIAGNOSIS — Z20.2 STD EXPOSURE: Primary | ICD-10-CM

## 2025-03-20 RX ADMIN — Medication 1 G: at 10:44

## 2025-03-20 NOTE — TELEPHONE ENCOUNTER
See mychart encounter 3/19 for follow up details with patient.     Scheduling still needed. Routing to primary care [place] for assistance with scheduling.

## 2025-03-20 NOTE — PROGRESS NOTES
Clinic Administered Medication Documentation      Injectable Medication Documentation    Is there an active order (written within the past 365 days, with administrations remaining, not ) in the chart? No.     Order was obtained by provider in clinic.  Patient was given Ceftriaxone Sodium (Rocephin). Prior to medication administration, verified patient's identity using patient s name and date of birth. Please see MAR and medication order for additional information. Patient instructed to remain in clinic for 15 minutes and report any adverse reaction to staff immediately.    Vial/Syringe: Single dose vial. Was entire vial of medication used? Yes  Was this medication supplied by the patient?  No  Is this a medication the patient will need to receive again? No - not necessary to check for refills remaining.       Please refer to MAR for full injection details.    ZHEN Gonsalez

## 2025-03-20 NOTE — TELEPHONE ENCOUNTER
Writer responded via JoKno.  Lindy HAYNES, BSN, PHN, RN-[facility]  [phone]

## 2025-03-24 ENCOUNTER — MYC MEDICAL ADVICE (OUTPATIENT)
Dept: FAMILY MEDICINE | Facility: CLINIC | Age: 40
End: 2025-03-24
Payer: COMMERCIAL

## 2025-03-31 ENCOUNTER — APPOINTMENT (OUTPATIENT)
Dept: LAB | Facility: CLINIC | Age: 40
End: 2025-03-31
Payer: COMMERCIAL

## 2025-04-01 LAB
N GONORRHOEA DNA SPEC QL NAA+PROBE: NEGATIVE
SPECIMEN TYPE: NORMAL

## 2025-04-07 ASSESSMENT — ANXIETY QUESTIONNAIRES
2. NOT BEING ABLE TO STOP OR CONTROL WORRYING: NOT AT ALL
5. BEING SO RESTLESS THAT IT IS HARD TO SIT STILL: SEVERAL DAYS
3. WORRYING TOO MUCH ABOUT DIFFERENT THINGS: SEVERAL DAYS
7. FEELING AFRAID AS IF SOMETHING AWFUL MIGHT HAPPEN: SEVERAL DAYS
GAD7 TOTAL SCORE: 6
6. BECOMING EASILY ANNOYED OR IRRITABLE: SEVERAL DAYS
8. IF YOU CHECKED OFF ANY PROBLEMS, HOW DIFFICULT HAVE THESE MADE IT FOR YOU TO DO YOUR WORK, TAKE CARE OF THINGS AT HOME, OR GET ALONG WITH OTHER PEOPLE?: NOT DIFFICULT AT ALL
GAD7 TOTAL SCORE: 6
GAD7 TOTAL SCORE: 6
4. TROUBLE RELAXING: SEVERAL DAYS
IF YOU CHECKED OFF ANY PROBLEMS ON THIS QUESTIONNAIRE, HOW DIFFICULT HAVE THESE PROBLEMS MADE IT FOR YOU TO DO YOUR WORK, TAKE CARE OF THINGS AT HOME, OR GET ALONG WITH OTHER PEOPLE: NOT DIFFICULT AT ALL
7. FEELING AFRAID AS IF SOMETHING AWFUL MIGHT HAPPEN: SEVERAL DAYS
1. FEELING NERVOUS, ANXIOUS, OR ON EDGE: SEVERAL DAYS

## 2025-04-08 ENCOUNTER — VIRTUAL VISIT (OUTPATIENT)
Dept: FAMILY MEDICINE | Facility: CLINIC | Age: 40
End: 2025-04-08
Payer: COMMERCIAL

## 2025-04-08 DIAGNOSIS — F41.9 ANXIETY: Primary | ICD-10-CM

## 2025-04-08 DIAGNOSIS — F33.1 MAJOR DEPRESSIVE DISORDER, RECURRENT EPISODE, MODERATE (H): ICD-10-CM

## 2025-04-08 PROCEDURE — 98006 SYNCH AUDIO-VIDEO EST MOD 30: CPT | Performed by: NURSE PRACTITIONER

## 2025-04-08 PROCEDURE — 96127 BRIEF EMOTIONAL/BEHAV ASSMT: CPT | Mod: 95 | Performed by: NURSE PRACTITIONER

## 2025-04-08 PROCEDURE — 1126F AMNT PAIN NOTED NONE PRSNT: CPT | Performed by: NURSE PRACTITIONER

## 2025-04-08 RX ORDER — BUPROPION HYDROCHLORIDE 150 MG/1
150 TABLET ORAL EVERY MORNING
Qty: 30 TABLET | Refills: 1 | Status: SHIPPED | OUTPATIENT
Start: 2025-04-08

## 2025-04-08 RX ORDER — ESCITALOPRAM OXALATE 5 MG/1
5 TABLET ORAL DAILY
Qty: 30 TABLET | Refills: 0 | Status: SHIPPED | OUTPATIENT
Start: 2025-04-08

## 2025-04-08 NOTE — PROGRESS NOTES
"Brent is a 39 year old who is being evaluated via a billable video visit.    How would you like to obtain your AVS? MyChart  If the video visit is dropped, the invitation should be resent by: Text to cell phone: [phone]  Will anyone else be joining your video visit? No      Assessment & Plan     (F41.9) Anxiety  (primary encounter diagnosis)  Comment: medication side effect  Plan: escitalopram (LEXAPRO) 5 MG tablet        Discussed a longer taper - see Pt Instructions.     (F33.1) Major depressive disorder, recurrent episode, moderate (H)  Comment:   Plan: buPROPion (WELLBUTRIN XL) 150 MG 24 hr tablet        Discussed seeing how she is feeling once off of Lexapro and can increase dose of Wellbutrin if needed.               BMI  Estimated body mass index is 31.85 kg/m  as calculated from the following:    Height as of 3/18/25: 1.727 m (5' 8\").    Weight as of 3/18/25: 95 kg (209 lb 8 oz).             Subjective   Brent is a 39 year old, presenting for the following health issues:  Medication Problem (Effects from Wellbutrin and weaning off Lexapro/)      4/8/2025     3:44 PM   Additional Questions   Roomed by Anna smith   Accompanied by self     History of Present Illness       Mental Health Follow-up:  Patient presents to follow-up on Depression & Anxiety.Patient's depression since last visit has been:  Better  The patient is not having other symptoms associated with depression.  Patient's anxiety since last visit has been:  Medium  The patient is not having other symptoms associated with anxiety.  Any significant life events: relationship concerns  Patient is feeling anxious or having panic attacks.  Patient has no concerns about alcohol or drug use.    She eats 2-3 servings of fruits and vegetables daily.She consumes 0 sweetened beverage(s) daily.She exercises with enough effort to increase her heart rate 30 to 60 minutes per day.  She exercises with enough effort to increase her heart rate 4 days per week.   She " "is taking medications regularly.      She started Wellbutrin and her mood felt great but it was affecting her sleep (waking up much earlier) and causing some constipation.  She felt like the Lexapro was making her feel tired, so she tapered off, but she thinks too quickly (one week).  She experienced crying, agitation, irritation.  She went back on 5 mg of Lexapro a week ago, which has helped with the discontinuation side effects.  She is not experiencing the same level of increased energy like she did                 Objective    Vitals - Patient Reported  Weight (Patient Reported): 93 kg (205 lb)  Height (Patient Reported): 170.2 cm (5' 7\")  BMI (Based on Pt Reported Ht/Wt): 32.11  Pain Score: No Pain (0)        Physical Exam   GENERAL: alert and no distress  EYES: Eyes grossly normal to inspection.  No discharge or erythema, or obvious scleral/conjunctival abnormalities.  RESP: No audible wheeze, cough, or visible cyanosis.    SKIN: Visible skin clear. No significant rash, abnormal pigmentation or lesions.  NEURO: Cranial nerves grossly intact.  Mentation and speech appropriate for age.  PSYCH: Appropriate affect, tone, and pace of words          Video-Visit Details    Type of service:  Video Visit   Originating Location (pt. Location): Home    Distant Location (provider location):  On-site  Platform used for Video Visit: Nidhi  Signed Electronically by: Josefina Aquino NP    "

## 2025-04-08 NOTE — PATIENT INSTRUCTIONS
Take 1/2 tablet (2.5 mg) daily for two weeks, then decrease to 1/2 tablet every other day for two weeks, then decrease to 1/2 tablet every third day for two weeks, then stop.

## 2025-05-06 ENCOUNTER — E-VISIT (OUTPATIENT)
Dept: URGENT CARE | Facility: CLINIC | Age: 40
End: 2025-05-06
Payer: COMMERCIAL

## 2025-05-06 DIAGNOSIS — N39.0 ACUTE UTI (URINARY TRACT INFECTION): Primary | ICD-10-CM

## 2025-05-06 RX ORDER — NITROFURANTOIN 25; 75 MG/1; MG/1
100 CAPSULE ORAL 2 TIMES DAILY
Qty: 10 CAPSULE | Refills: 0 | Status: SHIPPED | OUTPATIENT
Start: 2025-05-06 | End: 2025-05-11

## 2025-05-07 NOTE — PATIENT INSTRUCTIONS
Dear Brent Walters    After reviewing your responses, I've been able to diagnose you with a urinary tract infection, which is a common infection of the bladder with bacteria.  This is not a sexually transmitted infection, though urinating immediately after intercourse can help prevent infections.  Drinking lots of fluids is also helpful to clear your current infection and prevent the next one.      I have sent a prescription for antibiotics to your pharmacy to treat this infection.    It is important that you take all of your prescribed medication even if your symptoms are improving after a few doses.  Taking all of your medicine helps prevent the symptoms from returning.     If your symptoms worsen, you develop pain in your back or stomach, develop fevers, or are not improving in 5 days, please contact your primary care provider for an appointment or visit any of our convenient Walk-in or Urgent Care Centers to be seen, which can be found on our website here.    Thanks again for choosing us as your health care partner,    Radha Rae CNP  Urinary Tract Infection (UTI) in Women: Care Instructions  Overview     A urinary tract infection (UTI) is an infection caused by bacteria. It can happen anywhere in the urinary tract. A UTI can happen in the:  Kidneys.  Ureters, the tubes that connect the kidneys to the bladder.  Bladder.  Urethra, where the urine comes out.  Most UTIs are bladder infections. They often cause pain or burning when you urinate.  Most UTIs can be cured with antibiotics. If you are prescribed antibiotics, be sure to complete your treatment so that the infection does not get worse.  Follow-up care is a key part of your treatment and safety. Be sure to make and go to all appointments, and call your doctor if you are having problems. It's also a good idea to know your test results and keep a list of the medicines you take.  How can you care for yourself at home?  Take your antibiotics as  "directed. Do not stop taking them just because you feel better. You need to take the full course of antibiotics.  Drink extra water and other fluids for the next day or two. This will help make the urine less concentrated and help wash out the bacteria that are causing the infection. (If you have kidney, heart, or liver disease and have to limit fluids, talk with your doctor before you increase the amount of fluids you drink.)  Avoid drinks that are carbonated or have caffeine. They can irritate the bladder.  Urinate often. Try to empty your bladder each time.  To relieve pain, take a hot bath or lay a heating pad set on low over your lower belly or genital area. Never go to sleep with a heating pad in place.  To prevent UTIs  Drink plenty of water each day. This helps you urinate often, which clears bacteria from your system. (If you have kidney, heart, or liver disease and have to limit fluids, talk with your doctor before you increase the amount of fluids you drink.)  Urinate when you need to.  If you are sexually active, urinate right after you have sex.  Change sanitary pads often.  Avoid douches, bubble baths, feminine hygiene sprays, and other feminine hygiene products that have deodorants.  After going to the bathroom, wipe from front to back.  When should you call for help?   Call your doctor now or seek immediate medical care if:    You have new or worse fever, chills, nausea, or vomiting.     You have new pain in your back just below your rib cage. This is called flank pain.     There is new blood or pus in your urine.     You have any problems with your antibiotic medicine.   Watch closely for changes in your health, and be sure to contact your doctor if:    You are not getting better after taking an antibiotic for 2 days.     Your symptoms go away but then come back.   Where can you learn more?  Go to https://www.healthwise.net/patiented  Enter K848 in the search box to learn more about \"Urinary Tract " "Infection (UTI) in Women: Care Instructions.\"  Current as of: April 30, 2024  Content Version: 14.4    [id number] [employer].   Care instructions adapted under license by your healthcare professional. If you have questions about a medical condition or this instruction, always ask your healthcare professional. [employer] disclaims any warranty or liability for your use of this information.    "

## 2025-06-03 ENCOUNTER — PATIENT OUTREACH (OUTPATIENT)
Dept: CARE COORDINATION | Facility: CLINIC | Age: 40
End: 2025-06-03
Payer: COMMERCIAL

## 2025-06-17 ENCOUNTER — PATIENT OUTREACH (OUTPATIENT)
Dept: CARE COORDINATION | Facility: CLINIC | Age: 40
End: 2025-06-17
Payer: COMMERCIAL

## (undated) RX ORDER — EPHEDRINE SULFATE 50 MG/ML
INJECTION, SOLUTION INTRAVENOUS
Status: DISPENSED
Start: 2019-10-12